# Patient Record
Sex: MALE | Race: WHITE | Employment: OTHER | URBAN - METROPOLITAN AREA
[De-identification: names, ages, dates, MRNs, and addresses within clinical notes are randomized per-mention and may not be internally consistent; named-entity substitution may affect disease eponyms.]

---

## 2017-02-16 RX ORDER — FINASTERIDE 5 MG/1
5 TABLET, FILM COATED ORAL DAILY
Qty: 90 TABLET | Refills: 3 | Status: SHIPPED | OUTPATIENT
Start: 2017-02-16 | End: 2017-03-27 | Stop reason: SDUPTHER

## 2017-02-24 RX ORDER — TAMSULOSIN HYDROCHLORIDE 0.4 MG/1
0.4 CAPSULE ORAL DAILY
Qty: 90 CAPSULE | Refills: 3 | Status: SHIPPED | OUTPATIENT
Start: 2017-02-24 | End: 2017-03-27 | Stop reason: SDUPTHER

## 2017-03-14 ENCOUNTER — HOSPITAL ENCOUNTER (OUTPATIENT)
Dept: ULTRASOUND IMAGING | Age: 82
Discharge: HOME | End: 2017-03-14
Payer: MEDICARE

## 2017-03-14 DIAGNOSIS — N28.1 ACQUIRED CYST OF KIDNEY: ICD-10-CM

## 2017-03-14 PROCEDURE — 76775 US EXAM ABDO BACK WALL LIM: CPT

## 2017-03-21 ENCOUNTER — OFFICE VISIT (OUTPATIENT)
Dept: UROLOGY | Age: 82
End: 2017-03-21

## 2017-03-21 VITALS
WEIGHT: 225 LBS | SYSTOLIC BLOOD PRESSURE: 138 MMHG | DIASTOLIC BLOOD PRESSURE: 84 MMHG | HEART RATE: 72 BPM | BODY MASS INDEX: 28.88 KG/M2 | HEIGHT: 74 IN

## 2017-03-21 DIAGNOSIS — N13.8 BPH WITH OBSTRUCTION/LOWER URINARY TRACT SYMPTOMS: Primary | ICD-10-CM

## 2017-03-21 DIAGNOSIS — N40.1 BPH WITH OBSTRUCTION/LOWER URINARY TRACT SYMPTOMS: Primary | ICD-10-CM

## 2017-03-21 LAB
ANION GAP SERPL CALCULATED.3IONS-SCNC: 13 MEQ/L (ref 7–13)
BUN BLDV-MCNC: 30 MG/DL (ref 8–23)
CALCIUM SERPL-MCNC: 9.2 MG/DL (ref 8.6–10.2)
CHLORIDE BLD-SCNC: 102 MEQ/L (ref 98–107)
CO2: 24 MEQ/L (ref 22–29)
CREAT SERPL-MCNC: 1.28 MG/DL (ref 0.7–1.2)
GFR AFRICAN AMERICAN: >60
GFR NON-AFRICAN AMERICAN: 53.3
GLUCOSE BLD-MCNC: 137 MG/DL (ref 74–109)
HBA1C MFR BLD: 7.7 % (ref 4.8–5.9)
POTASSIUM SERPL-SCNC: 5.2 MEQ/L (ref 3.5–5.1)
SODIUM BLD-SCNC: 139 MEQ/L (ref 132–144)

## 2017-03-21 PROCEDURE — 4040F PNEUMOC VAC/ADMIN/RCVD: CPT | Performed by: UROLOGY

## 2017-03-21 PROCEDURE — 99213 OFFICE O/P EST LOW 20 MIN: CPT | Performed by: UROLOGY

## 2017-03-21 PROCEDURE — G8427 DOCREV CUR MEDS BY ELIG CLIN: HCPCS | Performed by: UROLOGY

## 2017-03-21 PROCEDURE — G8420 CALC BMI NORM PARAMETERS: HCPCS | Performed by: UROLOGY

## 2017-03-21 PROCEDURE — 1036F TOBACCO NON-USER: CPT | Performed by: UROLOGY

## 2017-03-21 PROCEDURE — G8484 FLU IMMUNIZE NO ADMIN: HCPCS | Performed by: UROLOGY

## 2017-03-21 PROCEDURE — 1123F ACP DISCUSS/DSCN MKR DOCD: CPT | Performed by: UROLOGY

## 2017-03-21 RX ORDER — TAMSULOSIN HYDROCHLORIDE 0.4 MG/1
0.4 CAPSULE ORAL DAILY
Qty: 90 CAPSULE | Refills: 3 | Status: SHIPPED | OUTPATIENT
Start: 2017-03-21 | End: 2018-04-12 | Stop reason: SDUPTHER

## 2017-03-21 RX ORDER — FINASTERIDE 5 MG/1
5 TABLET, FILM COATED ORAL DAILY
Qty: 90 TABLET | Refills: 3 | Status: SHIPPED | OUTPATIENT
Start: 2017-03-21 | End: 2018-03-24 | Stop reason: SDUPTHER

## 2017-03-21 ASSESSMENT — ENCOUNTER SYMPTOMS
SHORTNESS OF BREATH: 0
ABDOMINAL PAIN: 0
ABDOMINAL DISTENTION: 0

## 2017-03-27 ENCOUNTER — OFFICE VISIT (OUTPATIENT)
Dept: SURGERY | Age: 82
End: 2017-03-27

## 2017-03-27 VITALS
WEIGHT: 230 LBS | SYSTOLIC BLOOD PRESSURE: 112 MMHG | DIASTOLIC BLOOD PRESSURE: 69 MMHG | HEIGHT: 74 IN | BODY MASS INDEX: 29.52 KG/M2 | HEART RATE: 79 BPM

## 2017-03-27 LAB — GLUCOSE BLD-MCNC: 207 MG/DL

## 2017-03-27 PROCEDURE — G8484 FLU IMMUNIZE NO ADMIN: HCPCS | Performed by: INTERNAL MEDICINE

## 2017-03-27 PROCEDURE — G8420 CALC BMI NORM PARAMETERS: HCPCS | Performed by: INTERNAL MEDICINE

## 2017-03-27 PROCEDURE — 1036F TOBACCO NON-USER: CPT | Performed by: INTERNAL MEDICINE

## 2017-03-27 PROCEDURE — 82962 GLUCOSE BLOOD TEST: CPT | Performed by: INTERNAL MEDICINE

## 2017-03-27 PROCEDURE — G8427 DOCREV CUR MEDS BY ELIG CLIN: HCPCS | Performed by: INTERNAL MEDICINE

## 2017-03-27 PROCEDURE — 99213 OFFICE O/P EST LOW 20 MIN: CPT | Performed by: INTERNAL MEDICINE

## 2017-03-27 PROCEDURE — 1123F ACP DISCUSS/DSCN MKR DOCD: CPT | Performed by: INTERNAL MEDICINE

## 2017-03-27 PROCEDURE — 4040F PNEUMOC VAC/ADMIN/RCVD: CPT | Performed by: INTERNAL MEDICINE

## 2017-03-27 ASSESSMENT — ENCOUNTER SYMPTOMS: EYES NEGATIVE: 1

## 2017-04-03 ENCOUNTER — HOSPITAL ENCOUNTER (OUTPATIENT)
Dept: CARDIOLOGY | Age: 82
Discharge: HOME | End: 2017-04-03
Payer: MEDICARE

## 2017-04-03 PROCEDURE — 93296 REM INTERROG EVL PM/IDS: CPT

## 2017-06-05 RX ORDER — RIVAROXABAN 15 MG/1
TABLET, FILM COATED ORAL
Qty: 90 TABLET | Refills: 2 | Status: SHIPPED | OUTPATIENT
Start: 2017-06-05 | End: 2018-01-22 | Stop reason: SDUPTHER

## 2017-06-06 ENCOUNTER — OFFICE VISIT (OUTPATIENT)
Dept: CARDIOLOGY | Age: 82
End: 2017-06-06

## 2017-06-06 VITALS
HEART RATE: 62 BPM | SYSTOLIC BLOOD PRESSURE: 147 MMHG | TEMPERATURE: 97.8 F | WEIGHT: 229 LBS | HEIGHT: 74 IN | RESPIRATION RATE: 18 BRPM | BODY MASS INDEX: 29.39 KG/M2 | OXYGEN SATURATION: 98 % | DIASTOLIC BLOOD PRESSURE: 79 MMHG

## 2017-06-06 DIAGNOSIS — I42.0 DILATED CARDIOMYOPATHY (HCC): Primary | ICD-10-CM

## 2017-06-06 DIAGNOSIS — I10 ESSENTIAL HYPERTENSION: Chronic | ICD-10-CM

## 2017-06-06 DIAGNOSIS — Z51.81 ANTICOAGULATION MANAGEMENT ENCOUNTER: ICD-10-CM

## 2017-06-06 DIAGNOSIS — I48.20 CHRONIC ATRIAL FIBRILLATION (HCC): Chronic | ICD-10-CM

## 2017-06-06 DIAGNOSIS — Z79.01 ANTICOAGULATION MANAGEMENT ENCOUNTER: ICD-10-CM

## 2017-06-06 DIAGNOSIS — E78.00 HYPERCHOLESTEREMIA: Chronic | ICD-10-CM

## 2017-06-06 PROCEDURE — G8427 DOCREV CUR MEDS BY ELIG CLIN: HCPCS | Performed by: INTERNAL MEDICINE

## 2017-06-06 PROCEDURE — 93000 ELECTROCARDIOGRAM COMPLETE: CPT | Performed by: INTERNAL MEDICINE

## 2017-06-06 PROCEDURE — 4040F PNEUMOC VAC/ADMIN/RCVD: CPT | Performed by: INTERNAL MEDICINE

## 2017-06-06 PROCEDURE — 1036F TOBACCO NON-USER: CPT | Performed by: INTERNAL MEDICINE

## 2017-06-06 PROCEDURE — G8419 CALC BMI OUT NRM PARAM NOF/U: HCPCS | Performed by: INTERNAL MEDICINE

## 2017-06-06 PROCEDURE — 1123F ACP DISCUSS/DSCN MKR DOCD: CPT | Performed by: INTERNAL MEDICINE

## 2017-06-06 PROCEDURE — 99214 OFFICE O/P EST MOD 30 MIN: CPT | Performed by: INTERNAL MEDICINE

## 2017-06-08 LAB
ALBUMIN SERPL-MCNC: 4 G/DL (ref 3.9–4.9)
ALP BLD-CCNC: 61 U/L (ref 35–104)
ALT SERPL-CCNC: 22 U/L (ref 0–41)
ANION GAP SERPL CALCULATED.3IONS-SCNC: 14 MEQ/L (ref 7–13)
AST SERPL-CCNC: 22 U/L (ref 0–40)
BILIRUB SERPL-MCNC: 0.5 MG/DL (ref 0–1.2)
BILIRUBIN URINE: NEGATIVE
BLOOD, URINE: ABNORMAL
BUN BLDV-MCNC: 32 MG/DL (ref 8–23)
CALCIUM SERPL-MCNC: 8.9 MG/DL (ref 8.6–10.2)
CHLORIDE BLD-SCNC: 104 MEQ/L (ref 98–107)
CLARITY: CLEAR
CO2: 22 MEQ/L (ref 22–29)
COLOR: YELLOW
CREAT SERPL-MCNC: 1.83 MG/DL (ref 0.7–1.2)
EPITHELIAL CELLS, UA: NORMAL /HPF
GFR AFRICAN AMERICAN: 42.7
GFR NON-AFRICAN AMERICAN: 35.3
GLOBULIN: 2.8 G/DL (ref 2.3–3.5)
GLUCOSE BLD-MCNC: 124 MG/DL (ref 74–109)
GLUCOSE URINE: NEGATIVE MG/DL
HCT VFR BLD CALC: 41.5 % (ref 42–52)
HEMOGLOBIN: 13.8 G/DL (ref 14–18)
KETONES, URINE: NEGATIVE MG/DL
LEUKOCYTE ESTERASE, URINE: NEGATIVE
MCH RBC QN AUTO: 31.4 PG (ref 27–31.3)
MCHC RBC AUTO-ENTMCNC: 33.2 % (ref 33–37)
MCV RBC AUTO: 94.6 FL (ref 80–100)
NITRITE, URINE: NEGATIVE
PARATHYROID HORMONE INTACT: 33.3 PG/ML (ref 15–65)
PDW BLD-RTO: 14.3 % (ref 11.5–14.5)
PH UA: 5 (ref 5–9)
PHOSPHORUS: 4.3 MG/DL (ref 2.5–4.5)
PLATELET # BLD: 129 K/UL (ref 130–400)
POTASSIUM SERPL-SCNC: 5.3 MEQ/L (ref 3.5–5.1)
PRO-BNP: 585 PG/ML
PROTEIN UA: NEGATIVE MG/DL
RBC # BLD: 4.39 M/UL (ref 4.7–6.1)
RBC UA: NORMAL /HPF (ref 0–2)
SODIUM BLD-SCNC: 140 MEQ/L (ref 132–144)
SPECIFIC GRAVITY UA: 1.02 (ref 1–1.03)
TOTAL PROTEIN: 6.8 G/DL (ref 6.4–8.1)
UROBILINOGEN, URINE: 0.2 E.U./DL
WBC # BLD: 5.4 K/UL (ref 4.8–10.8)
WBC UA: NORMAL /HPF (ref 0–5)

## 2017-06-14 ASSESSMENT — ENCOUNTER SYMPTOMS
GASTROINTESTINAL NEGATIVE: 1
ALLERGIC/IMMUNOLOGIC NEGATIVE: 1
EYES NEGATIVE: 1
CHEST TIGHTNESS: 0
SHORTNESS OF BREATH: 0

## 2017-07-10 DIAGNOSIS — I10 HTN (HYPERTENSION): ICD-10-CM

## 2017-07-10 DIAGNOSIS — I50.9 CHF (CONGESTIVE HEART FAILURE) (HCC): ICD-10-CM

## 2017-07-10 RX ORDER — SPIRONOLACTONE 25 MG/1
TABLET ORAL
Qty: 90 TABLET | Refills: 3 | Status: SHIPPED | OUTPATIENT
Start: 2017-07-10 | End: 2018-09-13 | Stop reason: SDUPTHER

## 2017-07-10 RX ORDER — CARVEDILOL 25 MG/1
TABLET ORAL
Qty: 180 TABLET | Refills: 3 | Status: SHIPPED | OUTPATIENT
Start: 2017-07-10 | End: 2018-08-15 | Stop reason: SDUPTHER

## 2017-08-09 LAB
ANION GAP SERPL CALCULATED.3IONS-SCNC: 14 MEQ/L (ref 7–13)
BUN BLDV-MCNC: 35 MG/DL (ref 8–23)
CALCIUM SERPL-MCNC: 8.7 MG/DL (ref 8.6–10.2)
CHLORIDE BLD-SCNC: 103 MEQ/L (ref 98–107)
CO2: 24 MEQ/L (ref 22–29)
CREAT SERPL-MCNC: 1.62 MG/DL (ref 0.7–1.2)
GFR AFRICAN AMERICAN: 49.1
GFR NON-AFRICAN AMERICAN: 40.6
GLUCOSE BLD-MCNC: 127 MG/DL (ref 74–109)
HBA1C MFR BLD: 7.6 % (ref 4.8–5.9)
POTASSIUM SERPL-SCNC: 4.8 MEQ/L (ref 3.5–5.1)
SODIUM BLD-SCNC: 141 MEQ/L (ref 132–144)

## 2017-08-15 ENCOUNTER — OFFICE VISIT (OUTPATIENT)
Dept: SURGERY | Age: 82
End: 2017-08-15

## 2017-08-15 VITALS
DIASTOLIC BLOOD PRESSURE: 64 MMHG | HEART RATE: 77 BPM | SYSTOLIC BLOOD PRESSURE: 99 MMHG | WEIGHT: 224 LBS | HEIGHT: 74 IN | BODY MASS INDEX: 28.75 KG/M2

## 2017-08-15 DIAGNOSIS — N18.3 CKD (CHRONIC KIDNEY DISEASE), STAGE 3 (MODERATE): ICD-10-CM

## 2017-08-15 LAB — GLUCOSE BLD-MCNC: 144 MG/DL

## 2017-08-15 PROCEDURE — G8419 CALC BMI OUT NRM PARAM NOF/U: HCPCS | Performed by: INTERNAL MEDICINE

## 2017-08-15 PROCEDURE — 99213 OFFICE O/P EST LOW 20 MIN: CPT | Performed by: INTERNAL MEDICINE

## 2017-08-15 PROCEDURE — 82962 GLUCOSE BLOOD TEST: CPT | Performed by: INTERNAL MEDICINE

## 2017-08-15 PROCEDURE — 1036F TOBACCO NON-USER: CPT | Performed by: INTERNAL MEDICINE

## 2017-08-15 PROCEDURE — 1123F ACP DISCUSS/DSCN MKR DOCD: CPT | Performed by: INTERNAL MEDICINE

## 2017-08-15 PROCEDURE — 4040F PNEUMOC VAC/ADMIN/RCVD: CPT | Performed by: INTERNAL MEDICINE

## 2017-08-15 PROCEDURE — G8427 DOCREV CUR MEDS BY ELIG CLIN: HCPCS | Performed by: INTERNAL MEDICINE

## 2017-09-14 RX ORDER — SAXAGLIPTIN 2.5 MG/1
TABLET, FILM COATED ORAL
Qty: 90 TABLET | Refills: 1 | Status: SHIPPED | OUTPATIENT
Start: 2017-09-14 | End: 2018-05-12 | Stop reason: SDUPTHER

## 2017-09-22 RX ORDER — BENAZEPRIL HYDROCHLORIDE 20 MG/1
TABLET ORAL
Qty: 180 TABLET | Refills: 3 | Status: SHIPPED | OUTPATIENT
Start: 2017-09-22 | End: 2018-01-01 | Stop reason: SDUPTHER

## 2017-09-26 ENCOUNTER — NURSE ONLY (OUTPATIENT)
Dept: UROLOGY | Age: 82
End: 2017-09-26

## 2017-09-26 ENCOUNTER — TELEPHONE (OUTPATIENT)
Dept: UROLOGY | Age: 82
End: 2017-09-26

## 2017-09-26 DIAGNOSIS — R35.0 FREQUENCY OF URINATION: Primary | ICD-10-CM

## 2017-09-26 LAB
BILIRUBIN, POC: ABNORMAL
BLOOD URINE, POC: ABNORMAL
CLARITY, POC: ABNORMAL
COLOR, POC: YELLOW
GLUCOSE URINE, POC: ABNORMAL
KETONES, POC: ABNORMAL
LEUKOCYTE EST, POC: ABNORMAL
NITRITE, POC: POSITIVE
PH, POC: 5
PROTEIN, POC: >300
SPECIFIC GRAVITY, POC: 1.02
UROBILINOGEN, POC: 0.2

## 2017-09-26 PROCEDURE — 81003 URINALYSIS AUTO W/O SCOPE: CPT | Performed by: UROLOGY

## 2017-09-26 RX ORDER — CIPROFLOXACIN 250 MG/1
250 TABLET, FILM COATED ORAL 2 TIMES DAILY
Qty: 14 TABLET | Refills: 0 | Status: SHIPPED | OUTPATIENT
Start: 2017-09-26 | End: 2017-10-05 | Stop reason: CLARIF

## 2017-09-29 LAB
ORGANISM: ABNORMAL
URINE CULTURE, ROUTINE: ABNORMAL
URINE CULTURE, ROUTINE: ABNORMAL

## 2017-10-02 ENCOUNTER — HOSPITAL ENCOUNTER (OUTPATIENT)
Dept: CARDIOLOGY | Age: 82
Discharge: HOME | End: 2017-10-02
Payer: MEDICARE

## 2017-10-02 PROCEDURE — 93290 INTERROG DEV EVAL ICPMS IP: CPT

## 2017-10-02 PROCEDURE — 93284 PRGRMG EVAL IMPLANTABLE DFB: CPT

## 2017-10-05 ENCOUNTER — OFFICE VISIT (OUTPATIENT)
Dept: UROLOGY | Age: 82
End: 2017-10-05

## 2017-10-05 VITALS
HEIGHT: 74 IN | WEIGHT: 225 LBS | DIASTOLIC BLOOD PRESSURE: 72 MMHG | BODY MASS INDEX: 28.88 KG/M2 | HEART RATE: 64 BPM | SYSTOLIC BLOOD PRESSURE: 122 MMHG

## 2017-10-05 DIAGNOSIS — R31.9 HEMATURIA: Primary | ICD-10-CM

## 2017-10-05 DIAGNOSIS — N39.0 URINARY TRACT INFECTION WITHOUT HEMATURIA, SITE UNSPECIFIED: ICD-10-CM

## 2017-10-05 DIAGNOSIS — N40.1 BPH WITH OBSTRUCTION/LOWER URINARY TRACT SYMPTOMS: ICD-10-CM

## 2017-10-05 DIAGNOSIS — N13.8 BPH WITH OBSTRUCTION/LOWER URINARY TRACT SYMPTOMS: ICD-10-CM

## 2017-10-05 LAB
BILIRUBIN, POC: ABNORMAL
BLOOD URINE, POC: ABNORMAL
CLARITY, POC: CLEAR
COLOR, POC: YELLOW
GLUCOSE URINE, POC: ABNORMAL
KETONES, POC: ABNORMAL
LEUKOCYTE EST, POC: ABNORMAL
NITRITE, POC: ABNORMAL
PH, POC: 5
PROTEIN, POC: ABNORMAL
SPECIFIC GRAVITY, POC: 1.01
UROBILINOGEN, POC: 0.2

## 2017-10-05 PROCEDURE — 99213 OFFICE O/P EST LOW 20 MIN: CPT | Performed by: UROLOGY

## 2017-10-05 PROCEDURE — 81003 URINALYSIS AUTO W/O SCOPE: CPT | Performed by: UROLOGY

## 2017-10-05 PROCEDURE — 51798 US URINE CAPACITY MEASURE: CPT | Performed by: UROLOGY

## 2017-10-05 PROCEDURE — 51741 ELECTRO-UROFLOWMETRY FIRST: CPT | Performed by: UROLOGY

## 2017-10-05 PROCEDURE — 4040F PNEUMOC VAC/ADMIN/RCVD: CPT | Performed by: UROLOGY

## 2017-10-05 PROCEDURE — G8427 DOCREV CUR MEDS BY ELIG CLIN: HCPCS | Performed by: UROLOGY

## 2017-10-05 PROCEDURE — G8419 CALC BMI OUT NRM PARAM NOF/U: HCPCS | Performed by: UROLOGY

## 2017-10-05 PROCEDURE — G8484 FLU IMMUNIZE NO ADMIN: HCPCS | Performed by: UROLOGY

## 2017-10-05 PROCEDURE — 1036F TOBACCO NON-USER: CPT | Performed by: UROLOGY

## 2017-10-05 PROCEDURE — 1123F ACP DISCUSS/DSCN MKR DOCD: CPT | Performed by: UROLOGY

## 2017-10-05 ASSESSMENT — ENCOUNTER SYMPTOMS
ABDOMINAL PAIN: 0
ABDOMINAL DISTENTION: 0

## 2017-10-05 NOTE — PROGRESS NOTES
Social History Narrative     History reviewed. No pertinent family history. Current Outpatient Prescriptions   Medication Sig Dispense Refill    glucose blood VI test strips (EXACTECH TEST) strip 1 each by In Vitro route 4 times daily As needed. 100 each 11    benazepril (LOTENSIN) 20 MG tablet Take 2 tablets by mouth  daily 180 tablet 3    ONGLYZA 2.5 MG TABS tablet Take 1 tablet by mouth  daily 90 tablet 1    spironolactone (ALDACTONE) 25 MG tablet Take 1 tablet by mouth  every day 90 tablet 3    carvedilol (COREG) 25 MG tablet Take 1 tablet by mouth  twice a day 180 tablet 3    XARELTO 15 MG TABS tablet Take 1 tablet by mouth  every 24 hours 90 tablet 2    tamsulosin (FLOMAX) 0.4 MG capsule Take 1 capsule by mouth daily 90 capsule 3    finasteride (PROSCAR) 5 MG tablet Take 1 tablet by mouth daily 90 tablet 3    chlorothiazide (DIURIL) 250 MG tablet Take 1 tablet by mouth  every day 90 tablet 2    Glucose Blood (BLOOD GLUCOSE TEST STRIPS) STRP 1 each by In Vitro route 4 times daily 400 strip 1    B Complex Vitamins (VITAMIN B COMPLEX PO) Take 1 tablet by mouth daily      furosemide (LASIX) 40 MG tablet Take 1 tablet by mouth  weekly and as needed 15 tablet 3    aspirin 81 MG chewable tablet Take by mouth      Multiple Vitamin TABS Take 1 tablet by mouth daily       pantoprazole (PROTONIX) 40 MG tablet Take 40 mg by mouth daily       Selenium 100 MCG TABS Take 100 mcg by mouth daily       Insulin Pen Needle (NOVOFINE) 32G X 6 MM MISC Use tid 100 each 11    pravastatin (PRAVACHOL) 80 MG tablet Take 80 mg by mouth daily.  Coenzyme Q10 (COQ10) 100 MG CAPS Take 100 mg by mouth 2 times daily        No current facility-administered medications for this visit. Review of patient's allergies indicates no known allergies. reviewed      Review of Systems   Gastrointestinal: Negative for abdominal distention and abdominal pain.    Genitourinary: Negative for decreased urine volume, dysuria, 5.0   Protein, UA POC 10/05/2017  2:27 PM Unknown   neg   Urobilinogen, UA 10/05/2017  2:27 PM Unknown   0.2   Leukocytes, UA 10/05/2017  2:27 PM Unknown   neg   Nitrite, UA 10/05/2017  2:27 PM Unknown   neg       Uroflow: 5 ml/sec, vol 116cc  post void residual by U/S: 19cc    Assessment: This is an 79 yo white male with h/o HTN, CKD, Stones, DM, BPH w/LUTs on Flomax and Proscar (stable), prior negative microhematuria evaluation in 2009 and stable renal cysts by prior US and now back after treatment of a UTI with Cipro and the symptoms have resolved. His diarrhea has also resolved. He is emptying the bladder well by PVR today. Will continue with present management. Plan:      1.  F/U a scheduled

## 2017-11-08 LAB
ANION GAP SERPL CALCULATED.3IONS-SCNC: 15 MEQ/L (ref 7–13)
BUN BLDV-MCNC: 32 MG/DL (ref 8–23)
CALCIUM SERPL-MCNC: 9.2 MG/DL (ref 8.6–10.2)
CHLORIDE BLD-SCNC: 99 MEQ/L (ref 98–107)
CO2: 25 MEQ/L (ref 22–29)
CREAT SERPL-MCNC: 1.37 MG/DL (ref 0.7–1.2)
GFR AFRICAN AMERICAN: 59.6
GFR NON-AFRICAN AMERICAN: 49.2
GLUCOSE BLD-MCNC: 129 MG/DL (ref 74–109)
HBA1C MFR BLD: 7.5 % (ref 4.8–5.9)
POTASSIUM SERPL-SCNC: 4.7 MEQ/L (ref 3.5–5.1)
SODIUM BLD-SCNC: 139 MEQ/L (ref 132–144)

## 2017-11-13 ENCOUNTER — OFFICE VISIT (OUTPATIENT)
Dept: SURGERY | Age: 82
End: 2017-11-13

## 2017-11-13 VITALS
WEIGHT: 231.4 LBS | SYSTOLIC BLOOD PRESSURE: 109 MMHG | OXYGEN SATURATION: 97 % | BODY MASS INDEX: 29.7 KG/M2 | HEART RATE: 73 BPM | HEIGHT: 74 IN | DIASTOLIC BLOOD PRESSURE: 71 MMHG

## 2017-11-13 LAB — GLUCOSE BLD-MCNC: 188 MG/DL

## 2017-11-13 PROCEDURE — 4040F PNEUMOC VAC/ADMIN/RCVD: CPT | Performed by: INTERNAL MEDICINE

## 2017-11-13 PROCEDURE — 1036F TOBACCO NON-USER: CPT | Performed by: INTERNAL MEDICINE

## 2017-11-13 PROCEDURE — G8427 DOCREV CUR MEDS BY ELIG CLIN: HCPCS | Performed by: INTERNAL MEDICINE

## 2017-11-13 PROCEDURE — 1123F ACP DISCUSS/DSCN MKR DOCD: CPT | Performed by: INTERNAL MEDICINE

## 2017-11-13 PROCEDURE — 99213 OFFICE O/P EST LOW 20 MIN: CPT | Performed by: INTERNAL MEDICINE

## 2017-11-13 PROCEDURE — G8484 FLU IMMUNIZE NO ADMIN: HCPCS | Performed by: INTERNAL MEDICINE

## 2017-11-13 PROCEDURE — G8419 CALC BMI OUT NRM PARAM NOF/U: HCPCS | Performed by: INTERNAL MEDICINE

## 2017-11-13 PROCEDURE — 82962 GLUCOSE BLOOD TEST: CPT | Performed by: INTERNAL MEDICINE

## 2017-11-13 NOTE — PROGRESS NOTES
Normocephalic and atraumatic. Neck: Neck supple. Cardiovascular: Normal rate. Musculoskeletal: Normal range of motion. Neurological: He is alert. Skin: Skin is warm. Psychiatric: He has a normal mood and affect. Chemistry        Component Value Date/Time     11/08/2017 0809    K 4.7 11/08/2017 0809    CL 99 11/08/2017 0809    CO2 25 11/08/2017 0809    BUN 32 (H) 11/08/2017 0809    CREATININE 1.37 (H) 11/08/2017 0809        Component Value Date/Time    CALCIUM 9.2 11/08/2017 0809    ALKPHOS 61 06/08/2017 1057    AST 22 06/08/2017 1057    ALT 22 06/08/2017 1057    BILITOT 0.5 06/08/2017 1057          Assessment:      1. Uncontrolled type 2 diabetes mellitus with complication, without long-term current use of insulin ([facility] [address]  POCT Glucose           Plan:      Orders Placed This Encounter   Procedures    Basic Metabolic Panel     Standing Status:   Future     Standing Expiration Date:   11/13/2018    Hemoglobin A1C     Standing Status:   Future     Standing Expiration Date:   11/13/2018    POCT Glucose     The current medical regimen is effective;  continue present plan and medications.     F/u in 3 months

## 2017-12-14 ENCOUNTER — OFFICE VISIT (OUTPATIENT)
Dept: CARDIOLOGY | Age: 82
End: 2017-12-14

## 2017-12-14 VITALS
RESPIRATION RATE: 16 BRPM | TEMPERATURE: 97.9 F | DIASTOLIC BLOOD PRESSURE: 70 MMHG | OXYGEN SATURATION: 97 % | HEIGHT: 74 IN | WEIGHT: 229 LBS | BODY MASS INDEX: 29.39 KG/M2 | HEART RATE: 68 BPM | SYSTOLIC BLOOD PRESSURE: 118 MMHG

## 2017-12-14 DIAGNOSIS — Z79.01 ANTICOAGULANT LONG-TERM USE: Chronic | ICD-10-CM

## 2017-12-14 DIAGNOSIS — I10 ESSENTIAL HYPERTENSION: Chronic | ICD-10-CM

## 2017-12-14 DIAGNOSIS — E78.00 HYPERCHOLESTEREMIA: Chronic | ICD-10-CM

## 2017-12-14 DIAGNOSIS — I42.0 DILATED CARDIOMYOPATHY (HCC): Primary | ICD-10-CM

## 2017-12-14 DIAGNOSIS — I48.0 PAROXYSMAL ATRIAL FIBRILLATION (HCC): ICD-10-CM

## 2017-12-14 PROCEDURE — 99214 OFFICE O/P EST MOD 30 MIN: CPT | Performed by: INTERNAL MEDICINE

## 2017-12-14 PROCEDURE — 1036F TOBACCO NON-USER: CPT | Performed by: INTERNAL MEDICINE

## 2017-12-14 PROCEDURE — G8419 CALC BMI OUT NRM PARAM NOF/U: HCPCS | Performed by: INTERNAL MEDICINE

## 2017-12-14 PROCEDURE — 93000 ELECTROCARDIOGRAM COMPLETE: CPT | Performed by: INTERNAL MEDICINE

## 2017-12-14 PROCEDURE — 4040F PNEUMOC VAC/ADMIN/RCVD: CPT | Performed by: INTERNAL MEDICINE

## 2017-12-14 PROCEDURE — G8484 FLU IMMUNIZE NO ADMIN: HCPCS | Performed by: INTERNAL MEDICINE

## 2017-12-14 PROCEDURE — 1123F ACP DISCUSS/DSCN MKR DOCD: CPT | Performed by: INTERNAL MEDICINE

## 2017-12-14 PROCEDURE — G8427 DOCREV CUR MEDS BY ELIG CLIN: HCPCS | Performed by: INTERNAL MEDICINE

## 2017-12-14 ASSESSMENT — ENCOUNTER SYMPTOMS
EYES NEGATIVE: 1
NAUSEA: 0
COUGH: 0
SHORTNESS OF BREATH: 1
GASTROINTESTINAL NEGATIVE: 1
WHEEZING: 0
CHEST TIGHTNESS: 0
BLOOD IN STOOL: 0
STRIDOR: 0

## 2017-12-14 NOTE — PROGRESS NOTES
Subsequent Progress Note  Patient: Rachele Jones  YOB: 1931  MRN: [id number]    Chief Complaint: DCM SELBY  Chief Complaint   Patient presents with    Atrial Fibrillation     6 m f/u Former [facility] pt   2008 CRT-D    Subjective/HPI:  Known DCM EF 30-35 CRTD. No bleed falss cp but ahs selby and dizziness     EKG:SR 2008  Past Medical History:   Diagnosis Date    Anticoagulant long-term use     xarelto    Atrial fibrillation (HCC)     CAD (coronary artery disease)     Cardiomyopathy ([facility] [address]     Chronic kidney disease     Hyperlipidemia     Hypertension     Osteoarthritis     Type II or unspecified type diabetes mellitus without mention of complication, not stated as uncontrolled        Past Surgical History:   Procedure Laterality Date    CORONARY ANGIOPLASTY      DIAGNOSTIC CARDIAC CATH LAB PROCEDURE      PACEMAKER INSERTION N/A 1994    SKIN CANCER EXCISION  2016       No family history on file. Social History     Social History    Marital status:      Spouse name: N/A    Number of children: N/A    Years of education: N/A     Social History Main Topics    Smoking status: Never Smoker    Smokeless tobacco: Never Used    Alcohol use None    Drug use: Unknown    Sexual activity: Not Asked     Other Topics Concern    None     Social History Narrative    None       No Known Allergies    Current Outpatient Prescriptions   Medication Sig Dispense Refill    glucose blood VI test strips (EXACTECH TEST) strip 1 each by In Vitro route 4 times daily As needed.  100 each 11    benazepril (LOTENSIN) 20 MG tablet Take 2 tablets by mouth  daily 180 tablet 3    ONGLYZA 2.5 MG TABS tablet Take 1 tablet by mouth  daily 90 tablet 1    spironolactone (ALDACTONE) 25 MG tablet Take 1 tablet by mouth  every day 90 tablet 3    carvedilol (COREG) 25 MG tablet Take 1 tablet by mouth  twice a day (Patient taking differently: Take 1/2 tablet by mouth  twice a day) 180 tablet 3    XARELTO 15 MG TABS tablet Take 1 tablet by mouth  every 24 hours 90 tablet 2    tamsulosin (FLOMAX) 0.4 MG capsule Take 1 capsule by mouth daily 90 capsule 3    finasteride (PROSCAR) 5 MG tablet Take 1 tablet by mouth daily 90 tablet 3    chlorothiazide (DIURIL) 250 MG tablet Take 1 tablet by mouth  every day 90 tablet 2    Glucose Blood (BLOOD GLUCOSE TEST STRIPS) STRP 1 each by In Vitro route 4 times daily 400 strip 1    B Complex Vitamins (VITAMIN B COMPLEX PO) Take 1 tablet by mouth daily      furosemide (LASIX) 40 MG tablet Take 1 tablet by mouth  weekly and as needed 15 tablet 3    Multiple Vitamin TABS Take 1 tablet by mouth daily       Selenium 100 MCG TABS Take 100 mcg by mouth daily       Insulin Pen Needle (NOVOFINE) 32G X 6 MM MISC Use tid 100 each 11    pravastatin (PRAVACHOL) 80 MG tablet Take 80 mg by mouth daily.  Coenzyme Q10 (COQ10) 100 MG CAPS Take 100 mg by mouth 2 times daily        No current facility-administered medications for this visit. Review of Systems:   Review of Systems   Constitutional: Negative. Negative for diaphoresis and fatigue. HENT: Negative. Eyes: Negative. Respiratory: Positive for shortness of breath. Negative for cough, chest tightness, wheezing and stridor. Cardiovascular: Negative. Negative for chest pain, palpitations and leg swelling. Gastrointestinal: Negative. Negative for blood in stool and nausea. Genitourinary: Negative. Musculoskeletal: Negative. Skin: Negative. Neurological: Positive for dizziness and light-headedness. Negative for syncope and weakness. Hematological: Negative. Psychiatric/Behavioral: Negative. Physical Examination:    /70 (Site: Right Arm, Position: Sitting, Cuff Size: Large Adult)   Pulse 68   Temp 97.9 °F (36.6 °C) (Temporal)   Resp 16   Ht 6' 2\" (1.88 m)   Wt 229 lb (103.9 kg)   SpO2 97%   BMI 29.40 kg/m²    Physical Exam   Constitutional: He appears healthy. No distress.    HENT:

## 2018-01-01 ENCOUNTER — HOSPITAL ENCOUNTER (OUTPATIENT)
Dept: CARDIOLOGY | Age: 83
Discharge: HOME | End: 2018-12-31
Payer: MEDICARE

## 2018-01-01 ENCOUNTER — OFFICE VISIT (OUTPATIENT)
Dept: CARDIOLOGY CLINIC | Age: 83
End: 2018-01-01
Payer: MEDICARE

## 2018-01-01 ENCOUNTER — OFFICE VISIT (OUTPATIENT)
Dept: ENDOCRINOLOGY | Age: 83
End: 2018-01-01
Payer: MEDICARE

## 2018-01-01 VITALS
HEIGHT: 74 IN | WEIGHT: 239 LBS | HEART RATE: 75 BPM | OXYGEN SATURATION: 95 % | DIASTOLIC BLOOD PRESSURE: 76 MMHG | SYSTOLIC BLOOD PRESSURE: 124 MMHG | BODY MASS INDEX: 30.67 KG/M2

## 2018-01-01 VITALS
SYSTOLIC BLOOD PRESSURE: 112 MMHG | HEART RATE: 64 BPM | WEIGHT: 236.8 LBS | HEIGHT: 74 IN | BODY MASS INDEX: 30.39 KG/M2 | RESPIRATION RATE: 14 BRPM | DIASTOLIC BLOOD PRESSURE: 58 MMHG | OXYGEN SATURATION: 98 %

## 2018-01-01 DIAGNOSIS — I10 ESSENTIAL HYPERTENSION: Primary | Chronic | ICD-10-CM

## 2018-01-01 DIAGNOSIS — E11.65 UNCONTROLLED TYPE 2 DIABETES MELLITUS WITH HYPERGLYCEMIA (HCC): Primary | ICD-10-CM

## 2018-01-01 DIAGNOSIS — R60.9 EDEMA, UNSPECIFIED TYPE: ICD-10-CM

## 2018-01-01 DIAGNOSIS — I50.9 CONGESTIVE HEART FAILURE, UNSPECIFIED HF CHRONICITY, UNSPECIFIED HEART FAILURE TYPE (HCC): Chronic | ICD-10-CM

## 2018-01-01 DIAGNOSIS — B35.1 ONYCHOMYCOSIS: ICD-10-CM

## 2018-01-01 DIAGNOSIS — N18.30 STAGE 3 CHRONIC KIDNEY DISEASE (HCC): ICD-10-CM

## 2018-01-01 DIAGNOSIS — I48.20 CHRONIC ATRIAL FIBRILLATION (HCC): Chronic | ICD-10-CM

## 2018-01-01 DIAGNOSIS — I42.0 DCM (DILATED CARDIOMYOPATHY) (HCC): ICD-10-CM

## 2018-01-01 DIAGNOSIS — I25.119 CORONARY ARTERY DISEASE INVOLVING NATIVE CORONARY ARTERY OF NATIVE HEART WITH ANGINA PECTORIS (HCC): ICD-10-CM

## 2018-01-01 LAB
ANION GAP SERPL CALCULATED.3IONS-SCNC: 12 MEQ/L (ref 7–13)
BUN BLDV-MCNC: 36 MG/DL (ref 8–23)
CALCIUM SERPL-MCNC: 10.1 MG/DL (ref 8.6–10.2)
CHLORIDE BLD-SCNC: 104 MEQ/L (ref 98–107)
CO2: 24 MEQ/L (ref 22–29)
CREAT SERPL-MCNC: 1.78 MG/DL (ref 0.7–1.2)
GFR AFRICAN AMERICAN: 43.9
GFR NON-AFRICAN AMERICAN: 36.3
GLUCOSE BLD-MCNC: 194 MG/DL
GLUCOSE BLD-MCNC: 84 MG/DL (ref 74–109)
HBA1C MFR BLD: 7 % (ref 4.8–5.9)
POTASSIUM SERPL-SCNC: 5.5 MEQ/L (ref 3.5–5.1)
SODIUM BLD-SCNC: 140 MEQ/L (ref 132–144)

## 2018-01-01 PROCEDURE — 4040F PNEUMOC VAC/ADMIN/RCVD: CPT | Performed by: INTERNAL MEDICINE

## 2018-01-01 PROCEDURE — G8427 DOCREV CUR MEDS BY ELIG CLIN: HCPCS | Performed by: INTERNAL MEDICINE

## 2018-01-01 PROCEDURE — 1036F TOBACCO NON-USER: CPT | Performed by: INTERNAL MEDICINE

## 2018-01-01 PROCEDURE — 1123F ACP DISCUSS/DSCN MKR DOCD: CPT | Performed by: INTERNAL MEDICINE

## 2018-01-01 PROCEDURE — 82962 GLUCOSE BLOOD TEST: CPT | Performed by: INTERNAL MEDICINE

## 2018-01-01 PROCEDURE — G8598 ASA/ANTIPLAT THER USED: HCPCS | Performed by: INTERNAL MEDICINE

## 2018-01-01 PROCEDURE — 99213 OFFICE O/P EST LOW 20 MIN: CPT | Performed by: INTERNAL MEDICINE

## 2018-01-01 PROCEDURE — 1101F PT FALLS ASSESS-DOCD LE1/YR: CPT | Performed by: INTERNAL MEDICINE

## 2018-01-01 PROCEDURE — G8484 FLU IMMUNIZE NO ADMIN: HCPCS | Performed by: INTERNAL MEDICINE

## 2018-01-01 PROCEDURE — G8417 CALC BMI ABV UP PARAM F/U: HCPCS | Performed by: INTERNAL MEDICINE

## 2018-01-01 PROCEDURE — 99215 OFFICE O/P EST HI 40 MIN: CPT | Performed by: INTERNAL MEDICINE

## 2018-01-01 PROCEDURE — 93290 INTERROG DEV EVAL ICPMS IP: CPT

## 2018-01-01 PROCEDURE — 93000 ELECTROCARDIOGRAM COMPLETE: CPT | Performed by: INTERNAL MEDICINE

## 2018-01-01 PROCEDURE — 93284 PRGRMG EVAL IMPLANTABLE DFB: CPT

## 2018-01-01 RX ORDER — FUROSEMIDE 40 MG/1
40 TABLET ORAL DAILY
Qty: 30 TABLET | Refills: 3 | Status: SHIPPED | OUTPATIENT
Start: 2018-01-01 | End: 2019-01-01

## 2018-01-01 RX ORDER — FUROSEMIDE 40 MG/1
TABLET ORAL
Qty: 15 TABLET | Refills: 3 | Status: SHIPPED | OUTPATIENT
Start: 2018-01-01 | End: 2018-01-01 | Stop reason: SDUPTHER

## 2018-01-01 RX ORDER — BENAZEPRIL HYDROCHLORIDE 20 MG/1
TABLET ORAL
Qty: 180 TABLET | Refills: 3 | Status: SHIPPED | OUTPATIENT
Start: 2018-01-01 | End: 2019-01-01

## 2018-01-01 RX ORDER — GLIMEPIRIDE 1 MG/1
TABLET ORAL
Qty: 30 TABLET | Refills: 3 | Status: SHIPPED | OUTPATIENT
Start: 2018-01-01 | End: 2019-01-01

## 2018-01-01 RX ORDER — GLIMEPIRIDE 1 MG/1
TABLET ORAL
Qty: 30 TABLET | Refills: 3
Start: 2018-01-01 | End: 2018-01-01 | Stop reason: SDUPTHER

## 2018-01-01 RX ORDER — GLIMEPIRIDE 1 MG/1
TABLET ORAL
Qty: 30 TABLET | Refills: 3 | Status: SHIPPED | OUTPATIENT
Start: 2018-01-01 | End: 2018-01-01 | Stop reason: SDUPTHER

## 2018-01-01 ASSESSMENT — ENCOUNTER SYMPTOMS
GASTROINTESTINAL NEGATIVE: 1
NAUSEA: 0
BLOOD IN STOOL: 0
SHORTNESS OF BREATH: 1
EYES NEGATIVE: 1
WHEEZING: 0
COUGH: 0
STRIDOR: 0
CHEST TIGHTNESS: 0

## 2018-01-04 DIAGNOSIS — R06.09 DOE (DYSPNEA ON EXERTION): Primary | ICD-10-CM

## 2018-01-04 DIAGNOSIS — R42 DIZZINESS: ICD-10-CM

## 2018-01-04 DIAGNOSIS — I25.119 CORONARY ARTERY DISEASE INVOLVING NATIVE CORONARY ARTERY OF NATIVE HEART WITH ANGINA PECTORIS (HCC): ICD-10-CM

## 2018-01-04 DIAGNOSIS — I42.0 DCM (DILATED CARDIOMYOPATHY) (HCC): ICD-10-CM

## 2018-01-15 ENCOUNTER — HOSPITAL ENCOUNTER (OUTPATIENT)
Dept: NUCLEAR MEDICINE | Age: 83
Discharge: HOME | End: 2018-01-15
Payer: MEDICARE

## 2018-01-15 ENCOUNTER — HOSPITAL ENCOUNTER (OUTPATIENT)
Dept: NON INVASIVE DIAGNOSTICS | Age: 83
Discharge: HOME | End: 2018-01-15
Payer: MEDICARE

## 2018-01-15 DIAGNOSIS — R42 DIZZINESS: ICD-10-CM

## 2018-01-15 DIAGNOSIS — R06.09 DOE (DYSPNEA ON EXERTION): ICD-10-CM

## 2018-01-15 DIAGNOSIS — I42.0 DILATED CARDIOMYOPATHY (HCC): ICD-10-CM

## 2018-01-15 DIAGNOSIS — I42.0 DCM (DILATED CARDIOMYOPATHY) (HCC): ICD-10-CM

## 2018-01-15 DIAGNOSIS — I25.119 CORONARY ARTERY DISEASE INVOLVING NATIVE CORONARY ARTERY OF NATIVE HEART WITH ANGINA PECTORIS (HCC): ICD-10-CM

## 2018-01-15 LAB
LV EF: 40 %
LVEF MODALITY: NORMAL

## 2018-01-15 PROCEDURE — 93017 CV STRESS TEST TRACING ONLY: CPT

## 2018-01-15 PROCEDURE — 6360000002 HC RX W HCPCS: Performed by: INTERNAL MEDICINE

## 2018-01-15 PROCEDURE — 93306 TTE W/DOPPLER COMPLETE: CPT

## 2018-01-15 PROCEDURE — A9502 TC99M TETROFOSMIN: HCPCS | Performed by: INTERNAL MEDICINE

## 2018-01-15 PROCEDURE — 2580000003 HC RX 258: Performed by: INTERNAL MEDICINE

## 2018-01-15 PROCEDURE — 78452 HT MUSCLE IMAGE SPECT MULT: CPT

## 2018-01-15 PROCEDURE — 3430000000 HC RX DIAGNOSTIC RADIOPHARMACEUTICAL: Performed by: INTERNAL MEDICINE

## 2018-01-15 RX ORDER — SODIUM CHLORIDE 0.9 % (FLUSH) 0.9 %
10 SYRINGE (ML) INJECTION PRN
Status: DISCONTINUED | OUTPATIENT
Start: 2018-01-15 | End: 2018-01-18 | Stop reason: HOSPADM

## 2018-01-15 RX ADMIN — REGADENOSON 0.4 MG: 0.08 INJECTION, SOLUTION INTRAVENOUS at 12:22

## 2018-01-15 RX ADMIN — TETROFOSMIN 8.9 MILLICURIE: 0.23 INJECTION, POWDER, LYOPHILIZED, FOR SOLUTION INTRAVENOUS at 10:30

## 2018-01-15 RX ADMIN — TETROFOSMIN 32.3 MILLICURIE: 0.23 INJECTION, POWDER, LYOPHILIZED, FOR SOLUTION INTRAVENOUS at 12:22

## 2018-01-15 RX ADMIN — Medication 10 ML: at 10:37

## 2018-01-15 RX ADMIN — Medication 20 ML: at 12:28

## 2018-01-24 ENCOUNTER — TELEPHONE (OUTPATIENT)
Dept: CARDIOLOGY | Age: 83
End: 2018-01-24

## 2018-02-08 LAB
ANION GAP SERPL CALCULATED.3IONS-SCNC: 13 MEQ/L (ref 7–13)
BUN BLDV-MCNC: 34 MG/DL (ref 8–23)
CALCIUM SERPL-MCNC: 9 MG/DL (ref 8.6–10.2)
CHLORIDE BLD-SCNC: 100 MEQ/L (ref 98–107)
CO2: 26 MEQ/L (ref 22–29)
CREAT SERPL-MCNC: 1.31 MG/DL (ref 0.7–1.2)
GFR AFRICAN AMERICAN: >60
GFR NON-AFRICAN AMERICAN: 51.8
GLUCOSE BLD-MCNC: 158 MG/DL (ref 74–109)
HBA1C MFR BLD: 7.8 % (ref 4.8–5.9)
POTASSIUM SERPL-SCNC: 5.1 MEQ/L (ref 3.5–5.1)
SODIUM BLD-SCNC: 139 MEQ/L (ref 132–144)

## 2018-02-15 RX ORDER — CHLOROTHIAZIDE 250 MG/1
TABLET ORAL
Qty: 90 TABLET | Refills: 3 | Status: SHIPPED | OUTPATIENT
Start: 2018-02-15 | End: 2019-01-01 | Stop reason: SDUPTHER

## 2018-02-26 ENCOUNTER — OFFICE VISIT (OUTPATIENT)
Dept: ENDOCRINOLOGY | Age: 83
End: 2018-02-26
Payer: MEDICARE

## 2018-02-26 VITALS
WEIGHT: 229 LBS | BODY MASS INDEX: 29.39 KG/M2 | OXYGEN SATURATION: 99 % | HEART RATE: 79 BPM | DIASTOLIC BLOOD PRESSURE: 80 MMHG | HEIGHT: 74 IN | SYSTOLIC BLOOD PRESSURE: 130 MMHG

## 2018-02-26 LAB — GLUCOSE BLD-MCNC: 126 MG/DL

## 2018-02-26 PROCEDURE — 1036F TOBACCO NON-USER: CPT | Performed by: INTERNAL MEDICINE

## 2018-02-26 PROCEDURE — 99213 OFFICE O/P EST LOW 20 MIN: CPT | Performed by: INTERNAL MEDICINE

## 2018-02-26 PROCEDURE — G8419 CALC BMI OUT NRM PARAM NOF/U: HCPCS | Performed by: INTERNAL MEDICINE

## 2018-02-26 PROCEDURE — G8484 FLU IMMUNIZE NO ADMIN: HCPCS | Performed by: INTERNAL MEDICINE

## 2018-02-26 PROCEDURE — 82962 GLUCOSE BLOOD TEST: CPT | Performed by: INTERNAL MEDICINE

## 2018-02-26 PROCEDURE — G8427 DOCREV CUR MEDS BY ELIG CLIN: HCPCS | Performed by: INTERNAL MEDICINE

## 2018-02-26 PROCEDURE — 1123F ACP DISCUSS/DSCN MKR DOCD: CPT | Performed by: INTERNAL MEDICINE

## 2018-02-26 PROCEDURE — 4040F PNEUMOC VAC/ADMIN/RCVD: CPT | Performed by: INTERNAL MEDICINE

## 2018-02-26 PROCEDURE — G8598 ASA/ANTIPLAT THER USED: HCPCS | Performed by: INTERNAL MEDICINE

## 2018-02-26 NOTE — PROGRESS NOTES
Subjective:      Patient ID: Mich Roberst is a 80 y.o. male. Diabetes   He presents for his follow-up diabetic visit. He has type 2 diabetes mellitus. His disease course has been worsening. Hypoglycemia symptoms include dizziness and tremors. Associated symptoms include fatigue. Hypoglycemia complications include required assistance. Diabetic complications include autonomic neuropathy, heart disease and nephropathy. Risk factors for coronary artery disease include diabetes mellitus, dyslipidemia, male sex and obesity. Current diabetic treatment includes oral agent (monotherapy) (ONGLYZA 2.5 MG DAILY ). He is compliant with treatment most of the time. His weight is fluctuating minimally. He is following a generally healthy diet. His overall blood glucose range is 180-200 mg/dl. (Lab Results       Component                Value               Date                       LABA1C                   7.8 (H)             02/08/2018            ) An ACE inhibitor/angiotensin II receptor blocker is being taken. Eye exam is current (6/08/17 ). Patient Active Problem List   Diagnosis    Type II diabetes mellitus, uncontrolled (HCC)    CHF (congestive heart failure)    BPH (benign prostatic hyperplasia)    Hypercholesteremia    CKD (chronic kidney disease) stage 3, GFR 30-59 ml/min    Dystrophy of anterior cornea    Acute sinusitis    Basal cell carcinoma of nose    Retained foreign body of eyelid    Tear film insufficiency    Hypertension    Atrial fibrillation (HCC)    Anticoagulant long-term use    Cardiomyopathy ([facility] [address]     No Known Allergies        Current Outpatient Prescriptions:     chlorothiazide (DIURIL) 250 MG tablet, Take 1 tablet by mouth  every day, Disp: 90 tablet, Rfl: 3    rivaroxaban (XARELTO) 15 MG TABS tablet, Take 1 tablet by mouth  every 24 hours, Disp: 90 tablet, Rfl: 2    glucose blood VI test strips (EXACTECH TEST) strip, 1 each by In Vitro route 4 times daily As needed. , Disp: 100 each, Rfl: 11    benazepril (LOTENSIN) 20 MG tablet, Take 2 tablets by mouth  daily, Disp: 180 tablet, Rfl: 3    ONGLYZA 2.5 MG TABS tablet, Take 1 tablet by mouth  daily, Disp: 90 tablet, Rfl: 1    spironolactone (ALDACTONE) 25 MG tablet, Take 1 tablet by mouth  every day, Disp: 90 tablet, Rfl: 3    carvedilol (COREG) 25 MG tablet, Take 1 tablet by mouth  twice a day (Patient taking differently: Take 1/2 tablet by mouth  twice a day), Disp: 180 tablet, Rfl: 3    tamsulosin (FLOMAX) 0.4 MG capsule, Take 1 capsule by mouth daily, Disp: 90 capsule, Rfl: 3    finasteride (PROSCAR) 5 MG tablet, Take 1 tablet by mouth daily, Disp: 90 tablet, Rfl: 3    Glucose Blood (BLOOD GLUCOSE TEST STRIPS) STRP, 1 each by In Vitro route 4 times daily, Disp: 400 strip, Rfl: 1    B Complex Vitamins (VITAMIN B COMPLEX PO), Take 1 tablet by mouth daily, Disp: , Rfl:     furosemide (LASIX) 40 MG tablet, Take 1 tablet by mouth  weekly and as needed, Disp: 15 tablet, Rfl: 3    Multiple Vitamin TABS, Take 1 tablet by mouth daily , Disp: , Rfl:     Selenium 100 MCG TABS, Take 100 mcg by mouth daily , Disp: , Rfl:     Insulin Pen Needle (NOVOFINE) 32G X 6 MM MISC, Use tid, Disp: 100 each, Rfl: 11    pravastatin (PRAVACHOL) 80 MG tablet, Take 80 mg by mouth daily. , Disp: , Rfl:     Coenzyme Q10 (COQ10) 100 MG CAPS, Take 100 mg by mouth 2 times daily , Disp: , Rfl:       Review of Systems   Constitutional: Positive for fatigue. Neurological: Positive for dizziness and tremors. All other systems reviewed and are negative. Vitals:    02/26/18 1555   BP: 130/80   Pulse: 79   SpO2: 99%   Weight: 229 lb (103.9 kg)   Height: 6' 2\" (1.88 m)       Objective:   Physical Exam   Constitutional: He appears well-developed and well-nourished. HENT:   Head: Normocephalic and atraumatic. Neck: Neck supple. Cardiovascular: Normal rate and normal heart sounds.     Pulmonary/Chest: Effort normal and breath sounds normal.

## 2018-03-14 ENCOUNTER — OFFICE VISIT (OUTPATIENT)
Dept: PULMONOLOGY | Age: 83
End: 2018-03-14
Payer: MEDICARE

## 2018-03-14 VITALS
BODY MASS INDEX: 29.39 KG/M2 | HEART RATE: 65 BPM | WEIGHT: 229 LBS | TEMPERATURE: 96.7 F | DIASTOLIC BLOOD PRESSURE: 70 MMHG | SYSTOLIC BLOOD PRESSURE: 130 MMHG | HEIGHT: 74 IN | OXYGEN SATURATION: 94 %

## 2018-03-14 DIAGNOSIS — Z99.89 OSA ON CPAP: Primary | ICD-10-CM

## 2018-03-14 DIAGNOSIS — I50.9 CONGESTIVE HEART FAILURE, UNSPECIFIED CONGESTIVE HEART FAILURE CHRONICITY, UNSPECIFIED CONGESTIVE HEART FAILURE TYPE: Chronic | ICD-10-CM

## 2018-03-14 DIAGNOSIS — I48.20 CHRONIC ATRIAL FIBRILLATION (HCC): Chronic | ICD-10-CM

## 2018-03-14 DIAGNOSIS — E66.3 OVERWEIGHT (BMI 25.0-29.9): ICD-10-CM

## 2018-03-14 DIAGNOSIS — G47.33 OSA ON CPAP: Primary | ICD-10-CM

## 2018-03-14 PROCEDURE — G8427 DOCREV CUR MEDS BY ELIG CLIN: HCPCS | Performed by: INTERNAL MEDICINE

## 2018-03-14 PROCEDURE — 1123F ACP DISCUSS/DSCN MKR DOCD: CPT | Performed by: INTERNAL MEDICINE

## 2018-03-14 PROCEDURE — G8484 FLU IMMUNIZE NO ADMIN: HCPCS | Performed by: INTERNAL MEDICINE

## 2018-03-14 PROCEDURE — 4040F PNEUMOC VAC/ADMIN/RCVD: CPT | Performed by: INTERNAL MEDICINE

## 2018-03-14 PROCEDURE — G8598 ASA/ANTIPLAT THER USED: HCPCS | Performed by: INTERNAL MEDICINE

## 2018-03-14 PROCEDURE — G8419 CALC BMI OUT NRM PARAM NOF/U: HCPCS | Performed by: INTERNAL MEDICINE

## 2018-03-14 PROCEDURE — 99204 OFFICE O/P NEW MOD 45 MIN: CPT | Performed by: INTERNAL MEDICINE

## 2018-03-14 PROCEDURE — 1036F TOBACCO NON-USER: CPT | Performed by: INTERNAL MEDICINE

## 2018-03-14 ASSESSMENT — ENCOUNTER SYMPTOMS
VOMITING: 0
VOICE CHANGE: 0
CHEST TIGHTNESS: 0
EYE ITCHING: 0
ABDOMINAL PAIN: 0
DIARRHEA: 0
SORE THROAT: 0
WHEEZING: 0
COUGH: 0
NAUSEA: 0
RHINORRHEA: 0
SHORTNESS OF BREATH: 0

## 2018-03-20 ENCOUNTER — HOSPITAL ENCOUNTER (OUTPATIENT)
Dept: SLEEP CENTER | Age: 83
Discharge: HOME | End: 2018-03-22
Payer: MEDICARE

## 2018-03-20 PROCEDURE — 95811 POLYSOM 6/>YRS CPAP 4/> PARM: CPT

## 2018-03-21 DIAGNOSIS — N40.1 BPH WITH OBSTRUCTION/LOWER URINARY TRACT SYMPTOMS: ICD-10-CM

## 2018-03-21 DIAGNOSIS — N13.8 BPH WITH OBSTRUCTION/LOWER URINARY TRACT SYMPTOMS: ICD-10-CM

## 2018-03-21 LAB — PROSTATE SPECIFIC ANTIGEN: 0.17 NG/ML (ref 0–6.22)

## 2018-03-22 ENCOUNTER — OFFICE VISIT (OUTPATIENT)
Dept: UROLOGY | Age: 83
End: 2018-03-22
Payer: MEDICARE

## 2018-03-22 VITALS
DIASTOLIC BLOOD PRESSURE: 80 MMHG | BODY MASS INDEX: 29.77 KG/M2 | HEART RATE: 74 BPM | SYSTOLIC BLOOD PRESSURE: 122 MMHG | HEIGHT: 74 IN | WEIGHT: 232 LBS

## 2018-03-22 DIAGNOSIS — N40.1 BPH WITH OBSTRUCTION/LOWER URINARY TRACT SYMPTOMS: Primary | ICD-10-CM

## 2018-03-22 DIAGNOSIS — N13.8 BPH WITH OBSTRUCTION/LOWER URINARY TRACT SYMPTOMS: Primary | ICD-10-CM

## 2018-03-22 PROCEDURE — 1036F TOBACCO NON-USER: CPT | Performed by: UROLOGY

## 2018-03-22 PROCEDURE — G8598 ASA/ANTIPLAT THER USED: HCPCS | Performed by: UROLOGY

## 2018-03-22 PROCEDURE — 1123F ACP DISCUSS/DSCN MKR DOCD: CPT | Performed by: UROLOGY

## 2018-03-22 PROCEDURE — 99213 OFFICE O/P EST LOW 20 MIN: CPT | Performed by: UROLOGY

## 2018-03-22 PROCEDURE — 4040F PNEUMOC VAC/ADMIN/RCVD: CPT | Performed by: UROLOGY

## 2018-03-22 PROCEDURE — G8484 FLU IMMUNIZE NO ADMIN: HCPCS | Performed by: UROLOGY

## 2018-03-22 PROCEDURE — G8419 CALC BMI OUT NRM PARAM NOF/U: HCPCS | Performed by: UROLOGY

## 2018-03-22 PROCEDURE — G8427 DOCREV CUR MEDS BY ELIG CLIN: HCPCS | Performed by: UROLOGY

## 2018-03-22 ASSESSMENT — ENCOUNTER SYMPTOMS: SHORTNESS OF BREATH: 0

## 2018-03-24 DIAGNOSIS — N40.1 BPH WITH OBSTRUCTION/LOWER URINARY TRACT SYMPTOMS: ICD-10-CM

## 2018-03-24 DIAGNOSIS — N13.8 BPH WITH OBSTRUCTION/LOWER URINARY TRACT SYMPTOMS: ICD-10-CM

## 2018-03-26 RX ORDER — FINASTERIDE 5 MG/1
5 TABLET, FILM COATED ORAL DAILY
Qty: 90 TABLET | Refills: 3 | Status: SHIPPED | OUTPATIENT
Start: 2018-03-26 | End: 2019-01-01

## 2018-04-02 ENCOUNTER — HOSPITAL ENCOUNTER (OUTPATIENT)
Dept: CARDIOLOGY | Age: 83
Discharge: HOME | End: 2018-04-02
Payer: MEDICARE

## 2018-04-02 ENCOUNTER — OFFICE VISIT (OUTPATIENT)
Dept: PULMONOLOGY | Age: 83
End: 2018-04-02
Payer: MEDICARE

## 2018-04-02 VITALS
HEART RATE: 71 BPM | BODY MASS INDEX: 29.53 KG/M2 | OXYGEN SATURATION: 98 % | WEIGHT: 230 LBS | TEMPERATURE: 95.6 F | RESPIRATION RATE: 14 BRPM | SYSTOLIC BLOOD PRESSURE: 124 MMHG | DIASTOLIC BLOOD PRESSURE: 60 MMHG

## 2018-04-02 DIAGNOSIS — G47.33 OSA ON CPAP: Primary | ICD-10-CM

## 2018-04-02 DIAGNOSIS — Z99.89 OSA ON CPAP: Primary | ICD-10-CM

## 2018-04-02 DIAGNOSIS — E66.9 OBESITY (BMI 30-39.9): ICD-10-CM

## 2018-04-02 DIAGNOSIS — I48.20 CHRONIC ATRIAL FIBRILLATION (HCC): ICD-10-CM

## 2018-04-02 DIAGNOSIS — I42.9 CARDIOMYOPATHY, UNSPECIFIED TYPE (HCC): ICD-10-CM

## 2018-04-02 PROCEDURE — G8419 CALC BMI OUT NRM PARAM NOF/U: HCPCS | Performed by: INTERNAL MEDICINE

## 2018-04-02 PROCEDURE — 1036F TOBACCO NON-USER: CPT | Performed by: INTERNAL MEDICINE

## 2018-04-02 PROCEDURE — 4040F PNEUMOC VAC/ADMIN/RCVD: CPT | Performed by: INTERNAL MEDICINE

## 2018-04-02 PROCEDURE — G8598 ASA/ANTIPLAT THER USED: HCPCS | Performed by: INTERNAL MEDICINE

## 2018-04-02 PROCEDURE — 99214 OFFICE O/P EST MOD 30 MIN: CPT | Performed by: INTERNAL MEDICINE

## 2018-04-02 PROCEDURE — G8427 DOCREV CUR MEDS BY ELIG CLIN: HCPCS | Performed by: INTERNAL MEDICINE

## 2018-04-02 PROCEDURE — 93296 REM INTERROG EVL PM/IDS: CPT

## 2018-04-02 PROCEDURE — 1123F ACP DISCUSS/DSCN MKR DOCD: CPT | Performed by: INTERNAL MEDICINE

## 2018-04-02 ASSESSMENT — ENCOUNTER SYMPTOMS
ABDOMINAL PAIN: 0
RHINORRHEA: 0
WHEEZING: 0
DIARRHEA: 0
VOMITING: 0
COUGH: 0
CHEST TIGHTNESS: 0
EYE ITCHING: 0
NAUSEA: 0
VOICE CHANGE: 0
SHORTNESS OF BREATH: 0
SORE THROAT: 0

## 2018-04-05 ENCOUNTER — TELEPHONE (OUTPATIENT)
Dept: PULMONOLOGY | Age: 83
End: 2018-04-05

## 2018-04-12 ENCOUNTER — TELEPHONE (OUTPATIENT)
Dept: UROLOGY | Age: 83
End: 2018-04-12

## 2018-04-12 DIAGNOSIS — N40.1 BPH WITH OBSTRUCTION/LOWER URINARY TRACT SYMPTOMS: ICD-10-CM

## 2018-04-12 DIAGNOSIS — N13.8 BPH WITH OBSTRUCTION/LOWER URINARY TRACT SYMPTOMS: ICD-10-CM

## 2018-04-12 RX ORDER — TAMSULOSIN HYDROCHLORIDE 0.4 MG/1
0.4 CAPSULE ORAL DAILY
Qty: 30 CAPSULE | Refills: 0 | Status: SHIPPED | OUTPATIENT
Start: 2018-04-12 | End: 2018-05-22 | Stop reason: SDUPTHER

## 2018-04-23 RX ORDER — TAMSULOSIN HYDROCHLORIDE 0.4 MG/1
0.4 CAPSULE ORAL DAILY
Qty: 90 CAPSULE | Refills: 3 | Status: SHIPPED | OUTPATIENT
Start: 2018-04-23 | End: 2019-01-01

## 2018-05-14 RX ORDER — SAXAGLIPTIN 2.5 MG/1
TABLET, FILM COATED ORAL
Qty: 90 TABLET | Refills: 1 | Status: SHIPPED | OUTPATIENT
Start: 2018-05-14 | End: 2018-09-09 | Stop reason: SDUPTHER

## 2018-05-15 LAB
ANION GAP SERPL CALCULATED.3IONS-SCNC: 11 MEQ/L (ref 7–13)
BUN BLDV-MCNC: 33 MG/DL (ref 8–23)
CALCIUM SERPL-MCNC: 9.4 MG/DL (ref 8.6–10.2)
CHLORIDE BLD-SCNC: 102 MEQ/L (ref 98–107)
CO2: 27 MEQ/L (ref 22–29)
CREAT SERPL-MCNC: 1.79 MG/DL (ref 0.7–1.2)
GFR AFRICAN AMERICAN: 43.7
GFR NON-AFRICAN AMERICAN: 36.1
GLUCOSE BLD-MCNC: 131 MG/DL (ref 74–109)
HBA1C MFR BLD: 8.1 % (ref 4.8–5.9)
POTASSIUM SERPL-SCNC: 4.8 MEQ/L (ref 3.5–5.1)
SODIUM BLD-SCNC: 140 MEQ/L (ref 132–144)

## 2018-05-22 ENCOUNTER — OFFICE VISIT (OUTPATIENT)
Dept: ENDOCRINOLOGY | Age: 83
End: 2018-05-22
Payer: MEDICARE

## 2018-05-22 VITALS
SYSTOLIC BLOOD PRESSURE: 134 MMHG | DIASTOLIC BLOOD PRESSURE: 82 MMHG | WEIGHT: 231 LBS | BODY MASS INDEX: 29.65 KG/M2 | HEART RATE: 76 BPM | HEIGHT: 74 IN

## 2018-05-22 LAB — GLUCOSE BLD-MCNC: 202 MG/DL

## 2018-05-22 PROCEDURE — 82962 GLUCOSE BLOOD TEST: CPT | Performed by: INTERNAL MEDICINE

## 2018-05-22 PROCEDURE — 99213 OFFICE O/P EST LOW 20 MIN: CPT | Performed by: INTERNAL MEDICINE

## 2018-05-22 PROCEDURE — G8427 DOCREV CUR MEDS BY ELIG CLIN: HCPCS | Performed by: INTERNAL MEDICINE

## 2018-05-22 PROCEDURE — 1123F ACP DISCUSS/DSCN MKR DOCD: CPT | Performed by: INTERNAL MEDICINE

## 2018-05-22 PROCEDURE — G8419 CALC BMI OUT NRM PARAM NOF/U: HCPCS | Performed by: INTERNAL MEDICINE

## 2018-05-22 PROCEDURE — G8598 ASA/ANTIPLAT THER USED: HCPCS | Performed by: INTERNAL MEDICINE

## 2018-05-22 PROCEDURE — 1036F TOBACCO NON-USER: CPT | Performed by: INTERNAL MEDICINE

## 2018-05-22 PROCEDURE — 4040F PNEUMOC VAC/ADMIN/RCVD: CPT | Performed by: INTERNAL MEDICINE

## 2018-06-25 ENCOUNTER — OFFICE VISIT (OUTPATIENT)
Dept: CARDIOLOGY CLINIC | Age: 83
End: 2018-06-25
Payer: MEDICARE

## 2018-06-25 VITALS
OXYGEN SATURATION: 98 % | TEMPERATURE: 98.5 F | DIASTOLIC BLOOD PRESSURE: 80 MMHG | WEIGHT: 229 LBS | SYSTOLIC BLOOD PRESSURE: 130 MMHG | RESPIRATION RATE: 16 BRPM | HEIGHT: 74 IN | HEART RATE: 71 BPM | BODY MASS INDEX: 29.39 KG/M2

## 2018-06-25 DIAGNOSIS — I50.9 CONGESTIVE HEART FAILURE, UNSPECIFIED CONGESTIVE HEART FAILURE CHRONICITY, UNSPECIFIED CONGESTIVE HEART FAILURE TYPE: Chronic | ICD-10-CM

## 2018-06-25 DIAGNOSIS — Z95.810 AICD (AUTOMATIC CARDIOVERTER/DEFIBRILLATOR) PRESENT: ICD-10-CM

## 2018-06-25 DIAGNOSIS — I10 ESSENTIAL HYPERTENSION: Chronic | ICD-10-CM

## 2018-06-25 DIAGNOSIS — I48.20 CHRONIC ATRIAL FIBRILLATION (HCC): Primary | Chronic | ICD-10-CM

## 2018-06-25 PROCEDURE — 4040F PNEUMOC VAC/ADMIN/RCVD: CPT | Performed by: INTERNAL MEDICINE

## 2018-06-25 PROCEDURE — G8427 DOCREV CUR MEDS BY ELIG CLIN: HCPCS | Performed by: INTERNAL MEDICINE

## 2018-06-25 PROCEDURE — 1036F TOBACCO NON-USER: CPT | Performed by: INTERNAL MEDICINE

## 2018-06-25 PROCEDURE — G8598 ASA/ANTIPLAT THER USED: HCPCS | Performed by: INTERNAL MEDICINE

## 2018-06-25 PROCEDURE — 99214 OFFICE O/P EST MOD 30 MIN: CPT | Performed by: INTERNAL MEDICINE

## 2018-06-25 PROCEDURE — G8419 CALC BMI OUT NRM PARAM NOF/U: HCPCS | Performed by: INTERNAL MEDICINE

## 2018-06-25 PROCEDURE — 1123F ACP DISCUSS/DSCN MKR DOCD: CPT | Performed by: INTERNAL MEDICINE

## 2018-06-25 PROCEDURE — 93000 ELECTROCARDIOGRAM COMPLETE: CPT | Performed by: INTERNAL MEDICINE

## 2018-06-25 ASSESSMENT — ENCOUNTER SYMPTOMS
COUGH: 0
EYES NEGATIVE: 1
CHEST TIGHTNESS: 0
STRIDOR: 0
SHORTNESS OF BREATH: 1
BLOOD IN STOOL: 0
GASTROINTESTINAL NEGATIVE: 1
NAUSEA: 0
WHEEZING: 0

## 2018-07-02 ENCOUNTER — HOSPITAL ENCOUNTER (OUTPATIENT)
Dept: CARDIOLOGY | Age: 83
Discharge: HOME | End: 2018-07-02
Payer: MEDICARE

## 2018-07-02 PROCEDURE — 93284 PRGRMG EVAL IMPLANTABLE DFB: CPT

## 2018-07-02 PROCEDURE — 93290 INTERROG DEV EVAL ICPMS IP: CPT

## 2018-07-09 ENCOUNTER — OFFICE VISIT (OUTPATIENT)
Dept: PULMONOLOGY | Age: 83
End: 2018-07-09
Payer: MEDICARE

## 2018-07-09 VITALS
TEMPERATURE: 98.1 F | HEART RATE: 73 BPM | SYSTOLIC BLOOD PRESSURE: 116 MMHG | WEIGHT: 229.8 LBS | OXYGEN SATURATION: 97 % | BODY MASS INDEX: 29.49 KG/M2 | HEIGHT: 74 IN | DIASTOLIC BLOOD PRESSURE: 70 MMHG

## 2018-07-09 DIAGNOSIS — I42.9 CARDIOMYOPATHY, UNSPECIFIED TYPE (HCC): ICD-10-CM

## 2018-07-09 DIAGNOSIS — I48.20 CHRONIC ATRIAL FIBRILLATION (HCC): Chronic | ICD-10-CM

## 2018-07-09 DIAGNOSIS — Z99.89 OSA ON CPAP: Primary | ICD-10-CM

## 2018-07-09 DIAGNOSIS — G47.33 OSA ON CPAP: Primary | ICD-10-CM

## 2018-07-09 DIAGNOSIS — E66.3 OVERWEIGHT (BMI 25.0-29.9): ICD-10-CM

## 2018-07-09 PROCEDURE — 99214 OFFICE O/P EST MOD 30 MIN: CPT | Performed by: INTERNAL MEDICINE

## 2018-07-09 PROCEDURE — G8427 DOCREV CUR MEDS BY ELIG CLIN: HCPCS | Performed by: INTERNAL MEDICINE

## 2018-07-09 PROCEDURE — 4040F PNEUMOC VAC/ADMIN/RCVD: CPT | Performed by: INTERNAL MEDICINE

## 2018-07-09 PROCEDURE — 1036F TOBACCO NON-USER: CPT | Performed by: INTERNAL MEDICINE

## 2018-07-09 PROCEDURE — G8598 ASA/ANTIPLAT THER USED: HCPCS | Performed by: INTERNAL MEDICINE

## 2018-07-09 PROCEDURE — 1123F ACP DISCUSS/DSCN MKR DOCD: CPT | Performed by: INTERNAL MEDICINE

## 2018-07-09 PROCEDURE — G8419 CALC BMI OUT NRM PARAM NOF/U: HCPCS | Performed by: INTERNAL MEDICINE

## 2018-07-09 ASSESSMENT — ENCOUNTER SYMPTOMS
EYE ITCHING: 0
COUGH: 0
SORE THROAT: 0
VOMITING: 0
SHORTNESS OF BREATH: 0
RHINORRHEA: 0
CHEST TIGHTNESS: 0
ABDOMINAL PAIN: 0
VOICE CHANGE: 0
WHEEZING: 0
DIARRHEA: 0
NAUSEA: 0

## 2018-07-09 NOTE — PROGRESS NOTES
mouth daily.  Coenzyme Q10 (COQ10) 100 MG CAPS Take 100 mg by mouth 2 times daily        No current facility-administered medications for this visit. Results for orders placed during the hospital encounter of 03/26/14   XR Chest Standard TWO VW    Narrative TWO CHEST VIEWS      REASON FOR EXAM COUGH. COMPARISON November 1, 2010. FINDINGS Bipolar ICD device remains in satisfactory position. Heart  size normal. Lung fields clear of any fresh infiltrate. No overt signs  of pulmonary edema. No effusion. Bones intact. IMPRESSION STABLE CHEST. NO ACTIVE DISEASE. Cary Gallagher By- Katie Pickens M.D. Released By- Katie Pickens M.D. Released Date Time- 03/26/14 1641   This document has been electronically signed. ------------------------------------------------------------------------------       Assessment/Plan:     1. SURENDRA on CPAP  Patient is using CPAP with  8-20 centimeters of H2O with heated humidity. Patient is using CPAP for about 7-8 hours every night. Patient is using CPAP with full face  Mask. Patient said  sleep is restful with the CPAP use.he has new CPAP mask. He did not qualified for new CPAP machine. Patient said he has humidity problem, water goes away fast and and his mouth is dry. He will call DME to adjust humidity. Patient is compliant with CPAP therapy and benefiting with CPAP use. Counseling: CPAP/BiPAP uses, patient advised to use CPAP at least 5-6 hours every night. Driving: patient is advised for extreme caution when driving or operating machinery if there is a feeling of drowsiness, especially while driving it is preferable to stop driving and take a brief nap. Sleep hygiene:Avoid supine sleep, sleep on her sides. Avoid  sleep deprivation. Explained sleep hygiene. Advice to avoid Alcohol and sedative    Time spend over 25 min. Face to face. with greater than 50 % time with counseling regarding CPAP therapy. 2. Overweight (BMI 25.0-29. 9)  Patient patient is advised try to lose weight. obesity related risk explained to the patient ,  Current weight:  229 lb 12.8 oz (104.2 kg) Lbs. BMI:  Body mass index is 29.5 kg/m². 3. Chronic atrial fibrillation (HCC)  He is on Xarelto     4. Cardiomyopathy, unspecified type [facility]), s/p ICD  He is following with dr. Gloria Frank. Return in about 6 months (around 1/9/2019) for miguel.       Chen Tim MD

## 2018-08-15 RX ORDER — CARVEDILOL 25 MG/1
TABLET ORAL
Qty: 180 TABLET | Refills: 3 | Status: SHIPPED | OUTPATIENT
Start: 2018-08-15 | End: 2019-01-01

## 2018-08-16 LAB
ANION GAP SERPL CALCULATED.3IONS-SCNC: 12 MEQ/L (ref 7–13)
BUN BLDV-MCNC: 30 MG/DL (ref 8–23)
CALCIUM SERPL-MCNC: 9.4 MG/DL (ref 8.6–10.2)
CHLORIDE BLD-SCNC: 102 MEQ/L (ref 98–107)
CO2: 27 MEQ/L (ref 22–29)
CREAT SERPL-MCNC: 1.43 MG/DL (ref 0.7–1.2)
GFR AFRICAN AMERICAN: 56.6
GFR NON-AFRICAN AMERICAN: 46.8
GLUCOSE BLD-MCNC: 158 MG/DL (ref 74–109)
HBA1C MFR BLD: 8.4 % (ref 4.8–5.9)
POTASSIUM SERPL-SCNC: 5.1 MEQ/L (ref 3.5–5.1)
SODIUM BLD-SCNC: 141 MEQ/L (ref 132–144)

## 2018-08-22 ENCOUNTER — OFFICE VISIT (OUTPATIENT)
Dept: ENDOCRINOLOGY | Age: 83
End: 2018-08-22
Payer: MEDICARE

## 2018-08-22 VITALS
HEART RATE: 78 BPM | SYSTOLIC BLOOD PRESSURE: 130 MMHG | HEIGHT: 74 IN | DIASTOLIC BLOOD PRESSURE: 81 MMHG | WEIGHT: 230 LBS | BODY MASS INDEX: 29.52 KG/M2

## 2018-08-22 DIAGNOSIS — N18.30 STAGE 3 CHRONIC KIDNEY DISEASE (HCC): ICD-10-CM

## 2018-08-22 LAB — GLUCOSE BLD-MCNC: 157 MG/DL

## 2018-08-22 PROCEDURE — 82962 GLUCOSE BLOOD TEST: CPT | Performed by: INTERNAL MEDICINE

## 2018-08-22 PROCEDURE — G8419 CALC BMI OUT NRM PARAM NOF/U: HCPCS | Performed by: INTERNAL MEDICINE

## 2018-08-22 PROCEDURE — 1123F ACP DISCUSS/DSCN MKR DOCD: CPT | Performed by: INTERNAL MEDICINE

## 2018-08-22 PROCEDURE — 1036F TOBACCO NON-USER: CPT | Performed by: INTERNAL MEDICINE

## 2018-08-22 PROCEDURE — G8598 ASA/ANTIPLAT THER USED: HCPCS | Performed by: INTERNAL MEDICINE

## 2018-08-22 PROCEDURE — G8427 DOCREV CUR MEDS BY ELIG CLIN: HCPCS | Performed by: INTERNAL MEDICINE

## 2018-08-22 PROCEDURE — 1101F PT FALLS ASSESS-DOCD LE1/YR: CPT | Performed by: INTERNAL MEDICINE

## 2018-08-22 PROCEDURE — 99213 OFFICE O/P EST LOW 20 MIN: CPT | Performed by: INTERNAL MEDICINE

## 2018-08-22 PROCEDURE — 4040F PNEUMOC VAC/ADMIN/RCVD: CPT | Performed by: INTERNAL MEDICINE

## 2018-08-22 NOTE — PROGRESS NOTES
Subjective:      Patient ID: Rebeca Root is a 80 y.o. male. 3 month f/u     Diabetes   He presents for his follow-up diabetic visit. He has type 2 diabetes mellitus. His disease course has been worsening. Hypoglycemia symptoms include dizziness and tremors. Hypoglycemia complications include required assistance. Diabetic complications include autonomic neuropathy, heart disease and nephropathy. Risk factors for coronary artery disease include diabetes mellitus, dyslipidemia, male sex and obesity. Current diabetic treatment includes oral agent (monotherapy) (ONGLYZA 2.5 MG DAILY ). He is compliant with treatment most of the time. He is following a generally healthy diet. His overall blood glucose range is 180-200 mg/dl. (Lab Results       Component                Value               Date                       LABA1C                   8.4 (H)             08/16/2018            ) An ACE inhibitor/angiotensin II receptor blocker is being taken. Eye exam is current (2/2018 dr Desmond Brown ).      Patient Active Problem List   Diagnosis    Type II diabetes mellitus, uncontrolled ([address]    CHF (congestive heart failure)    BPH (benign prostatic hyperplasia)    Hypercholesteremia    CKD (chronic kidney disease) stage 3, GFR 30-59 ml/min    Dystrophy of anterior cornea    Acute sinusitis    Basal cell carcinoma of nose    Retained foreign body of eyelid    Tear film insufficiency    Hypertension    Atrial fibrillation (HCC)    Anticoagulant long-term use    Cardiomyopathy ([facility] [address]    AICD (automatic cardioverter/defibrillator) present     No Known Allergies        Current Outpatient Prescriptions:     Bromocriptine Mesylate (CYCLOSET) 0.8 MG TABS, 1-4 pills  po in am, Disp: 360 tablet, Rfl: 02    carvedilol (COREG) 25 MG tablet, Take 1 tablet by mouth  twice a day, Disp: 180 tablet, Rfl: 3    ONGLYZA 2.5 MG TABS tablet, TAKE 1 TABLET BY MOUTH  DAILY, Disp: 90 tablet, Rfl: 1    tamsulosin (FLOMAX) 0.4 MG Head: Normocephalic and atraumatic. Neck: Neck supple. Cardiovascular: Normal rate and normal heart sounds. Pulmonary/Chest: Effort normal and breath sounds normal.   Musculoskeletal: Normal range of motion. Feet:    Neurological: He is alert. Skin: Skin is warm. Psychiatric: He has a normal mood and affect. Chemistry        Component Value Date/Time     2018 0852    K 5.1 2018 0852     2018 0852    CO2 27 2018 0852    BUN 30 (H) 2018 0852    CREATININE 1.43 (H) 2018 0852        Component Value Date/Time    CALCIUM 9.4 2018 0852    ALKPHOS 61 2017 1057    AST 22 2017 1057    ALT 22 2017 1057    BILITOT 0.5 2017 1057          Assessment:       Diagnosis Orders   1.  Uncontrolled type 2 diabetes mellitus with complication, without long-term current use of insulin ([facility])  POCT Glucose    Basic Metabolic Panel    Hemoglobin A1C   2. Stage 3 chronic kidney disease             Plan:      Orders Placed This Encounter   Procedures    Basic Metabolic Panel     Standing Status:   Future     Standing Expiration Date:   2019    Hemoglobin A1C     Standing Status:   Future     Standing Expiration Date:   2019    POCT Glucose     Add cycloset to onglyza  discussed side effects   Orders Placed This Encounter   Medications    Bromocriptine Mesylate (CYCLOSET) 0.8 MG TABS     Si-4 pills  po in am     Dispense:  360 tablet     Refill:  02

## 2018-08-23 ENCOUNTER — TELEPHONE (OUTPATIENT)
Dept: ENDOCRINOLOGY | Age: 83
End: 2018-08-23

## 2018-08-31 RX ORDER — GLIMEPIRIDE 1 MG/1
1 TABLET ORAL
Qty: 30 TABLET | Refills: 3 | Status: SHIPPED | OUTPATIENT
Start: 2018-08-31 | End: 2018-01-01 | Stop reason: SDUPTHER

## 2018-09-10 RX ORDER — SAXAGLIPTIN 2.5 MG/1
TABLET, FILM COATED ORAL
Qty: 90 TABLET | Refills: 1 | Status: SHIPPED | OUTPATIENT
Start: 2018-09-10 | End: 2018-01-01 | Stop reason: ALTCHOICE

## 2018-09-12 DIAGNOSIS — I50.9 CONGESTIVE HEART FAILURE, UNSPECIFIED HF CHRONICITY, UNSPECIFIED HEART FAILURE TYPE (HCC): ICD-10-CM

## 2018-09-12 DIAGNOSIS — I15.9 SECONDARY HYPERTENSION: ICD-10-CM

## 2018-09-12 RX ORDER — SPIRONOLACTONE 25 MG/1
TABLET ORAL
Qty: 90 TABLET | Refills: 3 | Status: CANCELLED | OUTPATIENT
Start: 2018-09-12

## 2018-09-13 DIAGNOSIS — I10 ESSENTIAL HYPERTENSION: ICD-10-CM

## 2018-09-13 RX ORDER — SPIRONOLACTONE 25 MG/1
TABLET ORAL
Qty: 90 TABLET | Refills: 3 | Status: SHIPPED | OUTPATIENT
Start: 2018-09-13 | End: 2019-01-01

## 2018-10-01 ENCOUNTER — HOSPITAL ENCOUNTER (OUTPATIENT)
Dept: CARDIOLOGY | Age: 83
Discharge: HOME | End: 2018-10-01
Payer: MEDICARE

## 2018-10-01 PROCEDURE — 93296 REM INTERROG EVL PM/IDS: CPT

## 2018-11-28 NOTE — PROGRESS NOTES
Feet:    Skin: Skin is warm. Psychiatric: He has a normal mood and affect. Results for Irvin Floyd (MRN [id number]) as of 11/28/2018 07:29   Ref. Range 11/20/2018 08:14 11/20/2018 11:13   Sodium Latest Ref Range: 132 - 144 mEq/L 140    Potassium Latest Ref Range: 3.5 - 5.1 mEq/L 5.5 (H)    Chloride Latest Ref Range: 98 - 107 mEq/L 104    CO2 Latest Ref Range: 22 - 29 mEq/L 24    BUN Latest Ref Range: 8 - 23 mg/dL 36 (H)    Creatinine Latest Ref Range: 0.70 - 1.20 mg/dL 1.78 (H)    Anion Gap Latest Ref Range: 7 - 13 mEq/L 12    GFR Non- Latest Ref Range: >60  36.3 (L)    GFR  Latest Ref Range: >60  43.9 (L)    Glucose Latest Ref Range: 74 - 109 mg/dL 84    Calcium Latest Ref Range: 8.6 - 10.2 mg/dL 10.1    Hemoglobin A1C Latest Ref Range: 4.8 - 5.9 %  7.0 (H)     Assessment:       Diagnosis Orders   1. Uncontrolled type 2 diabetes mellitus with hyperglycemia (HCC)  POCT Glucose    Basic Metabolic Panel    Hemoglobin A1C   2. Stage 3 chronic kidney disease (HCC)     3. Edema, unspecified type     4.  Onychomycosis             Plan:      Orders Placed This Encounter   Procedures    Basic Metabolic Panel     Standing Status:   Future     Standing Expiration Date:   11/26/2019    Hemoglobin A1C     Standing Status:   Future     Standing Expiration Date:   11/26/2019    POCT Glucose        continue onglyza 2.5 mg daily   Add Amaryl if glucose more than 150   Orders Placed This Encounter   Medications    DISCONTD: glimepiride (AMARYL) 1 MG tablet     Sig: TAKE IF GLUCOSE MORE THAN 150     Dispense:  30 tablet     Refill:  3    glimepiride (AMARYL) 1 MG tablet     Sig: TAKE IF GLUCOSE MORE THAN 150     Dispense:  30 tablet     Refill:  3             Maeve Morales MD

## 2018-12-17 PROBLEM — I42.0 DCM (DILATED CARDIOMYOPATHY) (HCC): Status: ACTIVE | Noted: 2018-01-01

## 2018-12-17 PROBLEM — I25.119 CORONARY ARTERY DISEASE INVOLVING NATIVE CORONARY ARTERY OF NATIVE HEART WITH ANGINA PECTORIS (HCC): Status: ACTIVE | Noted: 2018-01-01

## 2018-12-17 NOTE — PROGRESS NOTES
Subsequent Progress Note  Patient: Brandan Siomn  YOB: 1931  MRN: [id number]    Chief Complaint: more sob lately, [facility]   Chief Complaint   Patient presents with    6 Month Follow-Up    Congestive Heart Failure    Atrial Fibrillation    Shortness of Breath     INCREASED SX       CV Data:  2008 CRTD  EF 30-35  1/2018 SPect negative  1/2018 Echo EF 40% 1-2AR, 2-3 TR  12/2008 CATH Minimal CAD    Subjective/HPI: more sob no cp no falls no bleed takes meds.    Has not been taking Furosemide     EKG: Paced AF    Past Medical History:   Diagnosis Date    Anticoagulant long-term use     xarelto    Atrial fibrillation (HCC)     CAD (coronary artery disease)     Cardiomyopathy (HCC)     Chronic kidney disease     Hyperlipidemia     Hypertension     Osteoarthritis     Type II or unspecified type diabetes mellitus without mention of complication, not stated as uncontrolled        Past Surgical History:   Procedure Laterality Date    CORONARY ANGIOPLASTY      DIAGNOSTIC CARDIAC CATH LAB PROCEDURE      EYE SURGERY Bilateral 03/2018    PACEMAKER INSERTION N/A 1994    SKIN CANCER EXCISION  2016       Family History   Problem Relation Age of Onset    Cancer Mother     Coronary Art Dis Father     Coronary Art Dis Brother     Hypertension Brother     High Cholesterol Brother     Diabetes Brother        Social History     Social History    Marital status:      Spouse name: N/A    Number of children: N/A    Years of education: N/A     Social History Main Topics    Smoking status: Never Smoker    Smokeless tobacco: Never Used    Alcohol use No    Drug use: No    Sexual activity: Not Asked     Other Topics Concern    None     Social History Narrative    None       No Known Allergies    Current Outpatient Prescriptions   Medication Sig Dispense Refill    glimepiride (AMARYL) 1 MG tablet TAKE IF GLUCOSE MORE THAN 150 30 tablet 3    furosemide (LASIX) 40 MG tablet Take 1 tablet by Genitourinary: Negative. Musculoskeletal: Negative. Skin: Negative. Neurological: Negative. Negative for dizziness, syncope, weakness and light-headedness. Hematological: Negative. Psychiatric/Behavioral: Negative. Physical Examination:    BP (!) 112/58 (Site: Left Upper Arm, Position: Sitting, Cuff Size: Large Adult)   Pulse 64   Resp 14   Ht 6' 2\" (1.88 m)   Wt 236 lb 12.8 oz (107.4 kg)   SpO2 98%   BMI 30.40 kg/m²    Physical Exam   Constitutional: He appears healthy. No distress. HENT:   Normal cephalic and Atraumatic   Eyes: Pupils are equal, round, and reactive to light. Neck: Normal range of motion and thyroid normal. Neck supple. No JVD present. No neck adenopathy. No thyromegaly present. Cardiovascular: Normal rate, regular rhythm, intact distal pulses and normal pulses. Murmur heard. Pulmonary/Chest: Effort normal. He has no wheezes. He has bibasilar rales. He exhibits no tenderness. Abdominal: Soft. Bowel sounds are normal. There is no tenderness. Musculoskeletal: Normal range of motion. He exhibits edema (1+). He exhibits no tenderness. Neurological: He is alert and oriented to person, place, and time. Skin: Skin is warm. No cyanosis. Nails show no clubbing.        LABS:  CBC:   Lab Results   Component Value Date    WBC 5.4 06/08/2017    RBC 4.39 06/08/2017    HGB 13.8 06/08/2017    HCT 41.5 06/08/2017    MCV 94.6 06/08/2017    MCH 31.4 06/08/2017    MCHC 33.2 06/08/2017    RDW 14.3 06/08/2017     06/08/2017    MPV 12.3 02/09/2015     Lipids:  Lab Results   Component Value Date    CHOL 156 09/21/2016    CHOL 162 11/23/2015    CHOL 167 06/14/2012     Lab Results   Component Value Date    TRIG 84 09/21/2016    TRIG 112 11/23/2015    TRIG 111 06/14/2012     Lab Results   Component Value Date    HDL 47 09/21/2016    HDL 46 11/23/2015    HDL 47 06/14/2012     Lab Results   Component Value Date    LDLCALC 92 09/21/2016    LDLCALC 94 11/23/2015    [address] 06/14/2012     No results found for: LABVLDL, VLDL  Lab Results   Component Value Date    CHOLHDLRATIO 3.6 06/14/2012     CMP:    Lab Results   Component Value Date     11/20/2018    K 5.5 11/20/2018     11/20/2018    CO2 24 11/20/2018    BUN 36 11/20/2018    CREATININE 1.78 11/20/2018    GFRAA 43.9 11/20/2018    LABGLOM 36.3 11/20/2018    GLUCOSE 194 11/26/2018    GLUCOSE 141 12/30/2011    PROT 6.8 06/08/2017    LABALBU 4.0 06/08/2017    CALCIUM 10.1 11/20/2018    BILITOT 0.5 06/08/2017    ALKPHOS 61 06/08/2017    AST 22 06/08/2017    ALT 22 06/08/2017     BMP:    Lab Results   Component Value Date     11/20/2018    K 5.5 11/20/2018     11/20/2018    CO2 24 11/20/2018    BUN 36 11/20/2018    LABALBU 4.0 06/08/2017    CREATININE 1.78 11/20/2018    CALCIUM 10.1 11/20/2018    GFRAA 43.9 11/20/2018    LABGLOM 36.3 11/20/2018    GLUCOSE 194 11/26/2018    GLUCOSE 141 12/30/2011     Magnesium:  No results found for: MG  TSH:No results found for: TSHFT4, TSH    Patient Active Problem List   Diagnosis    Type II diabetes mellitus, uncontrolled [facility]    CHF (congestive heart failure)    BPH (benign prostatic hyperplasia)    Hypercholesteremia    CKD (chronic kidney disease) stage 3, GFR 30-59 ml/min (HCC)    Dystrophy of anterior cornea    Acute sinusitis    Basal cell carcinoma of nose    Retained foreign body of eyelid    Tear film insufficiency    Hypertension    Atrial fibrillation (HCC)    Anticoagulant long-term use    Cardiomyopathy ([address]    AICD (automatic cardioverter/defibrillator) present    DCM (dilated cardiomyopathy) ([facility]    Coronary artery disease involving native coronary artery of native heart with angina pectoris ([facility] [facility]       There are no discontinued medications. Modified Medications    No medications on file       No orders of the defined types were placed in this encounter. Assessment/Plan:    1. Essential hypertension   stable   - EKG 12 lead    2.

## 2019-01-01 ENCOUNTER — TELEPHONE (OUTPATIENT)
Dept: UROLOGY | Age: 84
End: 2019-01-01

## 2019-01-01 ENCOUNTER — HOSPITAL ENCOUNTER (OUTPATIENT)
Age: 84
Setting detail: OUTPATIENT SURGERY
Discharge: HOME | End: 2019-08-14
Attending: UROLOGY | Admitting: UROLOGY
Payer: MEDICARE

## 2019-01-01 ENCOUNTER — APPOINTMENT (OUTPATIENT)
Dept: GENERAL RADIOLOGY | Age: 84
DRG: 854 | End: 2019-01-01
Payer: MEDICARE

## 2019-01-01 ENCOUNTER — OFFICE VISIT (OUTPATIENT)
Dept: UROLOGY | Age: 84
End: 2019-01-01
Payer: MEDICARE

## 2019-01-01 ENCOUNTER — OFFICE VISIT (OUTPATIENT)
Dept: INFECTIOUS DISEASES | Age: 84
End: 2019-01-01
Payer: MEDICARE

## 2019-01-01 ENCOUNTER — ANESTHESIA EVENT (OUTPATIENT)
Dept: OPERATING ROOM | Age: 84
DRG: 854 | End: 2019-01-01
Payer: MEDICARE

## 2019-01-01 ENCOUNTER — OFFICE VISIT (OUTPATIENT)
Dept: ENDOCRINOLOGY | Age: 84
End: 2019-01-01
Payer: MEDICARE

## 2019-01-01 ENCOUNTER — APPOINTMENT (OUTPATIENT)
Dept: INTERVENTIONAL RADIOLOGY/VASCULAR | Age: 84
DRG: 854 | End: 2019-01-01
Payer: MEDICARE

## 2019-01-01 ENCOUNTER — HOSPITAL ENCOUNTER (OUTPATIENT)
Dept: CARDIOLOGY | Age: 84
Discharge: HOME | End: 2019-09-03
Payer: MEDICARE

## 2019-01-01 ENCOUNTER — TELEPHONE (OUTPATIENT)
Dept: INFECTIOUS DISEASES | Age: 84
End: 2019-01-01

## 2019-01-01 ENCOUNTER — HOSPITAL ENCOUNTER (OUTPATIENT)
Dept: CT IMAGING | Age: 84
Discharge: HOME | End: 2019-08-14
Payer: MEDICARE

## 2019-01-01 ENCOUNTER — ANESTHESIA (OUTPATIENT)
Dept: OPERATING ROOM | Age: 84
DRG: 854 | End: 2019-01-01
Payer: MEDICARE

## 2019-01-01 ENCOUNTER — TELEPHONE (OUTPATIENT)
Dept: CARDIOLOGY CLINIC | Age: 84
End: 2019-01-01

## 2019-01-01 ENCOUNTER — APPOINTMENT (OUTPATIENT)
Dept: ULTRASOUND IMAGING | Age: 84
DRG: 854 | End: 2019-01-01
Payer: MEDICARE

## 2019-01-01 ENCOUNTER — APPOINTMENT (OUTPATIENT)
Dept: GENERAL RADIOLOGY | Age: 84
End: 2019-01-01
Payer: MEDICARE

## 2019-01-01 ENCOUNTER — OFFICE VISIT (OUTPATIENT)
Dept: CARDIOLOGY CLINIC | Age: 84
End: 2019-01-01
Payer: MEDICARE

## 2019-01-01 ENCOUNTER — HOSPITAL ENCOUNTER (OUTPATIENT)
Dept: GENERAL RADIOLOGY | Age: 84
Discharge: HOME | End: 2019-06-21
Payer: COMMERCIAL

## 2019-01-01 ENCOUNTER — HOSPITAL ENCOUNTER (EMERGENCY)
Age: 84
Discharge: ANOTHER HOSPITAL | End: 2019-03-28
Attending: EMERGENCY MEDICINE
Payer: MEDICARE

## 2019-01-01 ENCOUNTER — HOSPITAL ENCOUNTER (INPATIENT)
Age: 84
LOS: 6 days | Discharge: NURSING, REHAB OR LONG-TERM CARE | DRG: 854 | End: 2019-06-10
Attending: INTERNAL MEDICINE | Admitting: INTERNAL MEDICINE
Payer: MEDICARE

## 2019-01-01 ENCOUNTER — APPOINTMENT (OUTPATIENT)
Dept: CT IMAGING | Age: 84
DRG: 854 | End: 2019-01-01
Payer: MEDICARE

## 2019-01-01 ENCOUNTER — OFFICE VISIT (OUTPATIENT)
Dept: PULMONOLOGY | Age: 84
End: 2019-01-01
Payer: MEDICARE

## 2019-01-01 ENCOUNTER — APPOINTMENT (OUTPATIENT)
Dept: CT IMAGING | Age: 84
End: 2019-01-01
Payer: MEDICARE

## 2019-01-01 ENCOUNTER — ANESTHESIA (OUTPATIENT)
Dept: OPERATING ROOM | Age: 84
End: 2019-01-01
Payer: MEDICARE

## 2019-01-01 ENCOUNTER — ANESTHESIA EVENT (OUTPATIENT)
Dept: OPERATING ROOM | Age: 84
End: 2019-01-01
Payer: MEDICARE

## 2019-01-01 ENCOUNTER — HOSPITAL ENCOUNTER (OUTPATIENT)
Dept: CT IMAGING | Age: 84
Discharge: HOME | End: 2019-06-19
Payer: MEDICARE

## 2019-01-01 VITALS
SYSTOLIC BLOOD PRESSURE: 116 MMHG | HEART RATE: 62 BPM | BODY MASS INDEX: 27.98 KG/M2 | HEIGHT: 74 IN | WEIGHT: 218 LBS | DIASTOLIC BLOOD PRESSURE: 64 MMHG

## 2019-01-01 VITALS
HEIGHT: 74 IN | HEART RATE: 64 BPM | BODY MASS INDEX: 28.49 KG/M2 | WEIGHT: 222 LBS | DIASTOLIC BLOOD PRESSURE: 64 MMHG | SYSTOLIC BLOOD PRESSURE: 122 MMHG

## 2019-01-01 VITALS
HEIGHT: 74 IN | DIASTOLIC BLOOD PRESSURE: 78 MMHG | BODY MASS INDEX: 29.26 KG/M2 | WEIGHT: 228 LBS | HEART RATE: 73 BPM | SYSTOLIC BLOOD PRESSURE: 127 MMHG

## 2019-01-01 VITALS
HEART RATE: 80 BPM | SYSTOLIC BLOOD PRESSURE: 130 MMHG | DIASTOLIC BLOOD PRESSURE: 74 MMHG | TEMPERATURE: 98.4 F | WEIGHT: 227 LBS | RESPIRATION RATE: 16 BRPM | OXYGEN SATURATION: 95 % | HEIGHT: 74 IN | BODY MASS INDEX: 29.13 KG/M2

## 2019-01-01 VITALS
BODY MASS INDEX: 29.07 KG/M2 | RESPIRATION RATE: 14 BRPM | DIASTOLIC BLOOD PRESSURE: 62 MMHG | HEIGHT: 74 IN | OXYGEN SATURATION: 99 % | HEART RATE: 75 BPM | WEIGHT: 226.5 LBS | SYSTOLIC BLOOD PRESSURE: 108 MMHG

## 2019-01-01 VITALS
WEIGHT: 233 LBS | SYSTOLIC BLOOD PRESSURE: 118 MMHG | RESPIRATION RATE: 16 BRPM | HEIGHT: 74 IN | DIASTOLIC BLOOD PRESSURE: 74 MMHG | HEART RATE: 76 BPM | BODY MASS INDEX: 29.9 KG/M2 | OXYGEN SATURATION: 98 %

## 2019-01-01 VITALS
WEIGHT: 220 LBS | HEIGHT: 75 IN | BODY MASS INDEX: 27.35 KG/M2 | HEART RATE: 66 BPM | DIASTOLIC BLOOD PRESSURE: 72 MMHG | SYSTOLIC BLOOD PRESSURE: 136 MMHG

## 2019-01-01 VITALS
HEIGHT: 74 IN | BODY MASS INDEX: 29.65 KG/M2 | SYSTOLIC BLOOD PRESSURE: 118 MMHG | RESPIRATION RATE: 16 BRPM | HEART RATE: 74 BPM | OXYGEN SATURATION: 98 % | DIASTOLIC BLOOD PRESSURE: 64 MMHG | WEIGHT: 231 LBS

## 2019-01-01 VITALS
WEIGHT: 264 LBS | TEMPERATURE: 97.5 F | SYSTOLIC BLOOD PRESSURE: 162 MMHG | HEART RATE: 59 BPM | RESPIRATION RATE: 18 BRPM | DIASTOLIC BLOOD PRESSURE: 82 MMHG | HEIGHT: 74 IN | OXYGEN SATURATION: 100 % | BODY MASS INDEX: 33.88 KG/M2

## 2019-01-01 VITALS
SYSTOLIC BLOOD PRESSURE: 123 MMHG | TEMPERATURE: 97.6 F | DIASTOLIC BLOOD PRESSURE: 72 MMHG | HEART RATE: 64 BPM | BODY MASS INDEX: 28.5 KG/M2 | RESPIRATION RATE: 18 BRPM | HEIGHT: 74 IN

## 2019-01-01 VITALS
DIASTOLIC BLOOD PRESSURE: 70 MMHG | WEIGHT: 225 LBS | BODY MASS INDEX: 28.88 KG/M2 | HEART RATE: 61 BPM | HEIGHT: 74 IN | SYSTOLIC BLOOD PRESSURE: 120 MMHG

## 2019-01-01 VITALS
SYSTOLIC BLOOD PRESSURE: 122 MMHG | HEIGHT: 74 IN | HEART RATE: 61 BPM | WEIGHT: 214 LBS | DIASTOLIC BLOOD PRESSURE: 72 MMHG | BODY MASS INDEX: 27.46 KG/M2

## 2019-01-01 VITALS
HEIGHT: 74 IN | WEIGHT: 214 LBS | SYSTOLIC BLOOD PRESSURE: 152 MMHG | RESPIRATION RATE: 18 BRPM | HEART RATE: 60 BPM | OXYGEN SATURATION: 97 % | BODY MASS INDEX: 27.46 KG/M2 | TEMPERATURE: 97.9 F | DIASTOLIC BLOOD PRESSURE: 82 MMHG

## 2019-01-01 VITALS
OXYGEN SATURATION: 97 % | RESPIRATION RATE: 16 BRPM | HEART RATE: 76 BPM | SYSTOLIC BLOOD PRESSURE: 142 MMHG | BODY MASS INDEX: 28.88 KG/M2 | HEIGHT: 74 IN | DIASTOLIC BLOOD PRESSURE: 74 MMHG | WEIGHT: 225 LBS

## 2019-01-01 VITALS
HEART RATE: 62 BPM | OXYGEN SATURATION: 96 % | HEIGHT: 74 IN | RESPIRATION RATE: 18 BRPM | DIASTOLIC BLOOD PRESSURE: 78 MMHG | WEIGHT: 228 LBS | BODY MASS INDEX: 29.26 KG/M2 | TEMPERATURE: 98.2 F | SYSTOLIC BLOOD PRESSURE: 131 MMHG

## 2019-01-01 VITALS
BODY MASS INDEX: 30.03 KG/M2 | SYSTOLIC BLOOD PRESSURE: 100 MMHG | WEIGHT: 234 LBS | HEIGHT: 74 IN | HEART RATE: 82 BPM | DIASTOLIC BLOOD PRESSURE: 64 MMHG

## 2019-01-01 VITALS
SYSTOLIC BLOOD PRESSURE: 142 MMHG | OXYGEN SATURATION: 100 % | RESPIRATION RATE: 18 BRPM | DIASTOLIC BLOOD PRESSURE: 74 MMHG

## 2019-01-01 VITALS — SYSTOLIC BLOOD PRESSURE: 108 MMHG | TEMPERATURE: 98.1 F | OXYGEN SATURATION: 100 % | DIASTOLIC BLOOD PRESSURE: 64 MMHG

## 2019-01-01 VITALS
HEIGHT: 74 IN | DIASTOLIC BLOOD PRESSURE: 75 MMHG | BODY MASS INDEX: 28.89 KG/M2 | OXYGEN SATURATION: 98 % | HEART RATE: 62 BPM | SYSTOLIC BLOOD PRESSURE: 128 MMHG | RESPIRATION RATE: 18 BRPM

## 2019-01-01 VITALS
WEIGHT: 216 LBS | BODY MASS INDEX: 27.72 KG/M2 | HEART RATE: 70 BPM | HEIGHT: 74 IN | SYSTOLIC BLOOD PRESSURE: 120 MMHG | DIASTOLIC BLOOD PRESSURE: 80 MMHG

## 2019-01-01 DIAGNOSIS — I10 ESSENTIAL HYPERTENSION: Chronic | ICD-10-CM

## 2019-01-01 DIAGNOSIS — E11.65 UNCONTROLLED TYPE 2 DIABETES MELLITUS WITH HYPERGLYCEMIA (HCC): ICD-10-CM

## 2019-01-01 DIAGNOSIS — N18.30 STAGE 3 CHRONIC KIDNEY DISEASE (HCC): ICD-10-CM

## 2019-01-01 DIAGNOSIS — I25.119 CORONARY ARTERY DISEASE INVOLVING NATIVE CORONARY ARTERY OF NATIVE HEART WITH ANGINA PECTORIS (HCC): ICD-10-CM

## 2019-01-01 DIAGNOSIS — Z95.810 AICD (AUTOMATIC CARDIOVERTER/DEFIBRILLATOR) PRESENT: ICD-10-CM

## 2019-01-01 DIAGNOSIS — A41.59 KLEBSIELLA SEPSIS (HCC): ICD-10-CM

## 2019-01-01 DIAGNOSIS — E11.65 UNCONTROLLED TYPE 2 DIABETES MELLITUS WITH HYPERGLYCEMIA (HCC): Primary | ICD-10-CM

## 2019-01-01 DIAGNOSIS — N40.1 BPH WITH OBSTRUCTION/LOWER URINARY TRACT SYMPTOMS: ICD-10-CM

## 2019-01-01 DIAGNOSIS — Z95.810 AICD (AUTOMATIC CARDIOVERTER/DEFIBRILLATOR) PRESENT: Primary | ICD-10-CM

## 2019-01-01 DIAGNOSIS — N20.1 URETERAL CALCULUS, LEFT: Primary | ICD-10-CM

## 2019-01-01 DIAGNOSIS — R33.9 URINARY RETENTION: ICD-10-CM

## 2019-01-01 DIAGNOSIS — I50.9 CONGESTIVE HEART FAILURE, UNSPECIFIED HF CHRONICITY, UNSPECIFIED HEART FAILURE TYPE (HCC): Primary | Chronic | ICD-10-CM

## 2019-01-01 DIAGNOSIS — A49.8 KLEBSIELLA INFECTION: ICD-10-CM

## 2019-01-01 DIAGNOSIS — N12 PYELONEPHRITIS: ICD-10-CM

## 2019-01-01 DIAGNOSIS — N20.1 URETERAL CALCULUS, LEFT: ICD-10-CM

## 2019-01-01 DIAGNOSIS — R90.89 MIDLINE SHIFT OF BRAIN: ICD-10-CM

## 2019-01-01 DIAGNOSIS — A41.50 GRAM NEGATIVE SEPSIS (HCC): ICD-10-CM

## 2019-01-01 DIAGNOSIS — N17.9 AKI (ACUTE KIDNEY INJURY) (HCC): ICD-10-CM

## 2019-01-01 DIAGNOSIS — N12 PYELONEPHRITIS: Primary | ICD-10-CM

## 2019-01-01 DIAGNOSIS — I50.9 CONGESTIVE HEART FAILURE, UNSPECIFIED HF CHRONICITY, UNSPECIFIED HEART FAILURE TYPE (HCC): Chronic | ICD-10-CM

## 2019-01-01 DIAGNOSIS — G47.33 OSA ON CPAP: Primary | ICD-10-CM

## 2019-01-01 DIAGNOSIS — N20.0 KIDNEY STONE: ICD-10-CM

## 2019-01-01 DIAGNOSIS — N39.0 SEPSIS DUE TO URINARY TRACT INFECTION (HCC): ICD-10-CM

## 2019-01-01 DIAGNOSIS — E78.00 HYPERCHOLESTEREMIA: Chronic | ICD-10-CM

## 2019-01-01 DIAGNOSIS — N47.2 PARAPHIMOSIS: ICD-10-CM

## 2019-01-01 DIAGNOSIS — I42.0 DCM (DILATED CARDIOMYOPATHY) (HCC): ICD-10-CM

## 2019-01-01 DIAGNOSIS — R33.9 URINARY RETENTION: Primary | ICD-10-CM

## 2019-01-01 DIAGNOSIS — N20.0 NEPHROLITHIASIS: ICD-10-CM

## 2019-01-01 DIAGNOSIS — I48.20 CHRONIC ATRIAL FIBRILLATION (HCC): Chronic | ICD-10-CM

## 2019-01-01 DIAGNOSIS — N20.1 LEFT URETERAL STONE: Primary | ICD-10-CM

## 2019-01-01 DIAGNOSIS — N20.0 KIDNEY STONE: Primary | ICD-10-CM

## 2019-01-01 DIAGNOSIS — I10 ESSENTIAL HYPERTENSION: Primary | Chronic | ICD-10-CM

## 2019-01-01 DIAGNOSIS — I42.9 CARDIOMYOPATHY, UNSPECIFIED TYPE (HCC): ICD-10-CM

## 2019-01-01 DIAGNOSIS — N40.1 BPH WITH OBSTRUCTION/LOWER URINARY TRACT SYMPTOMS: Primary | ICD-10-CM

## 2019-01-01 DIAGNOSIS — N13.8 BPH WITH OBSTRUCTION/LOWER URINARY TRACT SYMPTOMS: ICD-10-CM

## 2019-01-01 DIAGNOSIS — E11.65 UNCONTROLLED TYPE 2 DIABETES MELLITUS WITH HYPERGLYCEMIA (HCC): Primary | Chronic | ICD-10-CM

## 2019-01-01 DIAGNOSIS — Z99.89 OSA ON CPAP: Primary | ICD-10-CM

## 2019-01-01 DIAGNOSIS — D72.829 LEUKOCYTOSIS, UNSPECIFIED TYPE: ICD-10-CM

## 2019-01-01 DIAGNOSIS — R33.9 RETENTION OF URINE: ICD-10-CM

## 2019-01-01 DIAGNOSIS — R27.0 ATAXIA: ICD-10-CM

## 2019-01-01 DIAGNOSIS — N39.0 COMPLICATED UTI (URINARY TRACT INFECTION): Primary | ICD-10-CM

## 2019-01-01 DIAGNOSIS — E66.3 OVERWEIGHT (BMI 25.0-29.9): ICD-10-CM

## 2019-01-01 DIAGNOSIS — S06.5XAA BILATERAL SUBDURAL HEMATOMAS: Primary | ICD-10-CM

## 2019-01-01 DIAGNOSIS — N13.8 BPH WITH OBSTRUCTION/LOWER URINARY TRACT SYMPTOMS: Primary | ICD-10-CM

## 2019-01-01 DIAGNOSIS — N20.1 LEFT URETERAL STONE: ICD-10-CM

## 2019-01-01 DIAGNOSIS — R53.83 FATIGUE, UNSPECIFIED TYPE: ICD-10-CM

## 2019-01-01 DIAGNOSIS — A41.9 SEPSIS DUE TO URINARY TRACT INFECTION (HCC): ICD-10-CM

## 2019-01-01 DIAGNOSIS — N39.0 COMPLICATED UTI (URINARY TRACT INFECTION): ICD-10-CM

## 2019-01-01 LAB
ALBUMIN SERPL-MCNC: 3.2 G/DL (ref 3.5–4.6)
ALBUMIN SERPL-MCNC: 3.9 G/DL (ref 3.5–4.6)
ALP BLD-CCNC: 107 U/L (ref 35–104)
ALP BLD-CCNC: 62 U/L (ref 35–104)
ALT SERPL-CCNC: 25 U/L (ref 0–41)
ALT SERPL-CCNC: 25 U/L (ref 0–41)
ANION GAP SERPL CALCULATED.3IONS-SCNC: 13 MEQ/L (ref 9–15)
ANION GAP SERPL CALCULATED.3IONS-SCNC: 13 MEQ/L (ref 9–15)
ANION GAP SERPL CALCULATED.3IONS-SCNC: 14 MEQ/L (ref 7–13)
ANION GAP SERPL CALCULATED.3IONS-SCNC: 14 MEQ/L (ref 9–15)
ANION GAP SERPL CALCULATED.3IONS-SCNC: 15 MEQ/L (ref 9–15)
ANION GAP SERPL CALCULATED.3IONS-SCNC: 16 MEQ/L (ref 9–15)
ANION GAP SERPL CALCULATED.3IONS-SCNC: 16 MEQ/L (ref 9–15)
ANION GAP SERPL CALCULATED.3IONS-SCNC: 17 MEQ/L (ref 7–13)
ANION GAP SERPL CALCULATED.3IONS-SCNC: 17 MEQ/L (ref 9–15)
ANION GAP SERPL CALCULATED.3IONS-SCNC: 17 MEQ/L (ref 9–15)
ANISOCYTOSIS: ABNORMAL
ANISOCYTOSIS: ABNORMAL
AST SERPL-CCNC: 18 U/L (ref 0–40)
AST SERPL-CCNC: 32 U/L (ref 0–40)
BACTERIA: ABNORMAL /HPF
BACTERIA: NEGATIVE /HPF
BANDED NEUTROPHILS RELATIVE PERCENT: 1 %
BASOPHILS ABSOLUTE: 0 K/UL (ref 0–0.2)
BASOPHILS ABSOLUTE: 0.1 K/UL (ref 0–0.2)
BASOPHILS RELATIVE PERCENT: 0.1 %
BASOPHILS RELATIVE PERCENT: 0.1 %
BASOPHILS RELATIVE PERCENT: 0.2 %
BASOPHILS RELATIVE PERCENT: 0.4 %
BASOPHILS RELATIVE PERCENT: 0.5 %
BASOPHILS RELATIVE PERCENT: 0.6 %
BASOPHILS RELATIVE PERCENT: 1 %
BILIRUB SERPL-MCNC: 0.7 MG/DL (ref 0.2–0.7)
BILIRUB SERPL-MCNC: 1 MG/DL (ref 0.2–0.7)
BILIRUBIN URINE: NEGATIVE
BILIRUBIN URINE: NEGATIVE
BLOOD CULTURE, ROUTINE: ABNORMAL
BLOOD CULTURE, ROUTINE: ABNORMAL
BLOOD CULTURE, ROUTINE: NORMAL
BLOOD CULTURE, ROUTINE: NORMAL
BLOOD, URINE: ABNORMAL
BLOOD, URINE: ABNORMAL
BUN BLDV-MCNC: 27 MG/DL (ref 8–23)
BUN BLDV-MCNC: 35 MG/DL (ref 8–23)
BUN BLDV-MCNC: 37 MG/DL (ref 8–23)
BUN BLDV-MCNC: 38 MG/DL (ref 8–23)
BUN BLDV-MCNC: 38 MG/DL (ref 8–23)
BUN BLDV-MCNC: 42 MG/DL (ref 8–23)
BUN BLDV-MCNC: 45 MG/DL (ref 8–23)
BUN BLDV-MCNC: 45 MG/DL (ref 8–23)
BUN BLDV-MCNC: 54 MG/DL (ref 8–23)
BUN BLDV-MCNC: 57 MG/DL (ref 8–23)
BUN BLDV-MCNC: 62 MG/DL (ref 8–23)
BUN BLDV-MCNC: 67 MG/DL (ref 8–23)
BUN BLDV-MCNC: 86 MG/DL (ref 8–23)
CALCIUM SERPL-MCNC: 7.2 MG/DL (ref 8.5–9.9)
CALCIUM SERPL-MCNC: 7.6 MG/DL (ref 8.5–9.9)
CALCIUM SERPL-MCNC: 7.7 MG/DL (ref 8.5–9.9)
CALCIUM SERPL-MCNC: 7.8 MG/DL (ref 8.5–9.9)
CALCIUM SERPL-MCNC: 8 MG/DL (ref 8.5–9.9)
CALCIUM SERPL-MCNC: 8.2 MG/DL (ref 8.5–9.9)
CALCIUM SERPL-MCNC: 8.8 MG/DL (ref 8.5–9.9)
CALCIUM SERPL-MCNC: 9.1 MG/DL (ref 8.5–9.9)
CALCIUM SERPL-MCNC: 9.2 MG/DL (ref 8.6–10.2)
CALCIUM SERPL-MCNC: 9.3 MG/DL (ref 8.5–9.9)
CALCIUM SERPL-MCNC: 9.4 MG/DL (ref 8.5–9.9)
CALCIUM SERPL-MCNC: 9.4 MG/DL (ref 8.6–10.2)
CALCIUM SERPL-MCNC: 9.5 MG/DL (ref 8.5–9.9)
CHLORIDE BLD-SCNC: 100 MEQ/L (ref 95–107)
CHLORIDE BLD-SCNC: 100 MEQ/L (ref 95–107)
CHLORIDE BLD-SCNC: 102 MEQ/L (ref 95–107)
CHLORIDE BLD-SCNC: 102 MEQ/L (ref 95–107)
CHLORIDE BLD-SCNC: 102 MEQ/L (ref 98–107)
CHLORIDE BLD-SCNC: 104 MEQ/L (ref 95–107)
CHLORIDE BLD-SCNC: 104 MEQ/L (ref 95–107)
CHLORIDE BLD-SCNC: 105 MEQ/L (ref 95–107)
CHLORIDE BLD-SCNC: 105 MEQ/L (ref 95–107)
CHLORIDE BLD-SCNC: 93 MEQ/L (ref 95–107)
CHLORIDE BLD-SCNC: 96 MEQ/L (ref 95–107)
CHLORIDE BLD-SCNC: 97 MEQ/L (ref 98–107)
CHLORIDE BLD-SCNC: 98 MEQ/L (ref 95–107)
CHP ED QC CHECK: NORMAL
CLARITY: ABNORMAL
CLARITY: CLEAR
CO2: 15 MEQ/L (ref 20–31)
CO2: 18 MEQ/L (ref 20–31)
CO2: 18 MEQ/L (ref 20–31)
CO2: 20 MEQ/L (ref 20–31)
CO2: 23 MEQ/L (ref 20–31)
CO2: 23 MEQ/L (ref 20–31)
CO2: 23 MEQ/L (ref 22–29)
CO2: 24 MEQ/L (ref 20–31)
CO2: 24 MEQ/L (ref 20–31)
CO2: 26 MEQ/L (ref 20–31)
CO2: 26 MEQ/L (ref 22–29)
COLOR: ABNORMAL
COLOR: ABNORMAL
CREAT SERPL-MCNC: 1.28 MG/DL (ref 0.7–1.2)
CREAT SERPL-MCNC: 1.41 MG/DL (ref 0.7–1.2)
CREAT SERPL-MCNC: 1.71 MG/DL (ref 0.7–1.2)
CREAT SERPL-MCNC: 1.87 MG/DL (ref 0.7–1.2)
CREAT SERPL-MCNC: 1.89 MG/DL (ref 0.7–1.2)
CREAT SERPL-MCNC: 1.89 MG/DL (ref 0.7–1.2)
CREAT SERPL-MCNC: 2.24 MG/DL (ref 0.7–1.2)
CREAT SERPL-MCNC: 2.53 MG/DL (ref 0.7–1.2)
CREAT SERPL-MCNC: 2.87 MG/DL (ref 0.7–1.2)
CREAT SERPL-MCNC: 2.89 MG/DL (ref 0.7–1.2)
CREAT SERPL-MCNC: 3.13 MG/DL (ref 0.7–1.2)
CREAT SERPL-MCNC: 3.54 MG/DL (ref 0.7–1.2)
CREAT SERPL-MCNC: 3.66 MG/DL (ref 0.7–1.2)
CREATININE URINE: 178.6 MG/DL
CULTURE, BLOOD 2: ABNORMAL
CULTURE, BLOOD 2: ABNORMAL
CULTURE, BLOOD 2: NORMAL
EKG ATRIAL RATE: 119 BPM
EKG ATRIAL RATE: 300 BPM
EKG ATRIAL RATE: 69 BPM
EKG Q-T INTERVAL: 462 MS
EKG Q-T INTERVAL: 482 MS
EKG Q-T INTERVAL: 504 MS
EKG QRS DURATION: 136 MS
EKG QRS DURATION: 142 MS
EKG QRS DURATION: 148 MS
EKG QTC CALCULATION (BAZETT): 468 MS
EKG QTC CALCULATION (BAZETT): 504 MS
EKG QTC CALCULATION (BAZETT): 509 MS
EKG R AXIS: 100 DEGREES
EKG R AXIS: 126 DEGREES
EKG R AXIS: 97 DEGREES
EKG T AXIS: 135 DEGREES
EKG T AXIS: 79 DEGREES
EKG T AXIS: 93 DEGREES
EKG VENTRICULAR RATE: 60 BPM
EKG VENTRICULAR RATE: 62 BPM
EKG VENTRICULAR RATE: 67 BPM
EOSINOPHILS ABSOLUTE: 0 K/UL (ref 0–0.7)
EOSINOPHILS ABSOLUTE: 0 K/UL (ref 0–0.7)
EOSINOPHILS ABSOLUTE: 0.1 K/UL (ref 0–0.7)
EOSINOPHILS ABSOLUTE: 0.2 K/UL (ref 0–0.7)
EOSINOPHILS ABSOLUTE: 0.2 K/UL (ref 0–0.7)
EOSINOPHILS ABSOLUTE: 0.3 K/UL (ref 0–0.7)
EOSINOPHILS ABSOLUTE: 0.4 K/UL (ref 0–0.7)
EOSINOPHILS RELATIVE PERCENT: 0 %
EOSINOPHILS RELATIVE PERCENT: 0.3 %
EOSINOPHILS RELATIVE PERCENT: 1.1 %
EOSINOPHILS RELATIVE PERCENT: 1.7 %
EOSINOPHILS RELATIVE PERCENT: 2.8 %
EOSINOPHILS RELATIVE PERCENT: 3 %
EOSINOPHILS RELATIVE PERCENT: 3.5 %
EPITHELIAL CELLS, UA: ABNORMAL /HPF (ref 0–5)
EPITHELIAL CELLS, UA: ABNORMAL /HPF (ref 0–5)
GFR AFRICAN AMERICAN: 19.1
GFR AFRICAN AMERICAN: 19.9
GFR AFRICAN AMERICAN: 22.9
GFR AFRICAN AMERICAN: 25.1
GFR AFRICAN AMERICAN: 25.3
GFR AFRICAN AMERICAN: 29.2
GFR AFRICAN AMERICAN: 33.7
GFR AFRICAN AMERICAN: 41
GFR AFRICAN AMERICAN: 41
GFR AFRICAN AMERICAN: 41.5
GFR AFRICAN AMERICAN: 46
GFR AFRICAN AMERICAN: 57.5
GFR AFRICAN AMERICAN: >60
GFR NON-AFRICAN AMERICAN: 15.8
GFR NON-AFRICAN AMERICAN: 16.4
GFR NON-AFRICAN AMERICAN: 18.9
GFR NON-AFRICAN AMERICAN: 20.8
GFR NON-AFRICAN AMERICAN: 20.9
GFR NON-AFRICAN AMERICAN: 24.2
GFR NON-AFRICAN AMERICAN: 27.8
GFR NON-AFRICAN AMERICAN: 33.8
GFR NON-AFRICAN AMERICAN: 33.9
GFR NON-AFRICAN AMERICAN: 34.3
GFR NON-AFRICAN AMERICAN: 38
GFR NON-AFRICAN AMERICAN: 47.5
GFR NON-AFRICAN AMERICAN: 53
GLOBULIN: 3.5 G/DL (ref 2.3–3.5)
GLOBULIN: 3.7 G/DL (ref 2.3–3.5)
GLUCOSE BLD-MCNC: 101 MG/DL (ref 60–115)
GLUCOSE BLD-MCNC: 104 MG/DL (ref 60–115)
GLUCOSE BLD-MCNC: 107 MG/DL (ref 60–115)
GLUCOSE BLD-MCNC: 109 MG/DL (ref 60–115)
GLUCOSE BLD-MCNC: 111 MG/DL (ref 70–99)
GLUCOSE BLD-MCNC: 112 MG/DL (ref 60–115)
GLUCOSE BLD-MCNC: 115 MG/DL (ref 60–115)
GLUCOSE BLD-MCNC: 115 MG/DL (ref 70–99)
GLUCOSE BLD-MCNC: 116 MG/DL (ref 70–99)
GLUCOSE BLD-MCNC: 118 MG/DL (ref 70–99)
GLUCOSE BLD-MCNC: 118 MG/DL (ref 70–99)
GLUCOSE BLD-MCNC: 123 MG/DL (ref 60–115)
GLUCOSE BLD-MCNC: 125 MG/DL (ref 60–115)
GLUCOSE BLD-MCNC: 125 MG/DL (ref 60–115)
GLUCOSE BLD-MCNC: 127 MG/DL (ref 60–115)
GLUCOSE BLD-MCNC: 127 MG/DL (ref 70–99)
GLUCOSE BLD-MCNC: 137 MG/DL (ref 60–115)
GLUCOSE BLD-MCNC: 138 MG/DL (ref 74–109)
GLUCOSE BLD-MCNC: 140 MG/DL (ref 70–99)
GLUCOSE BLD-MCNC: 140 MG/DL (ref 70–99)
GLUCOSE BLD-MCNC: 142 MG/DL (ref 60–115)
GLUCOSE BLD-MCNC: 142 MG/DL (ref 70–99)
GLUCOSE BLD-MCNC: 145 MG/DL (ref 60–115)
GLUCOSE BLD-MCNC: 148 MG/DL (ref 60–115)
GLUCOSE BLD-MCNC: 148 MG/DL (ref 60–115)
GLUCOSE BLD-MCNC: 150 MG/DL (ref 60–115)
GLUCOSE BLD-MCNC: 152 MG/DL (ref 60–115)
GLUCOSE BLD-MCNC: 153 MG/DL (ref 60–115)
GLUCOSE BLD-MCNC: 157 MG/DL (ref 60–115)
GLUCOSE BLD-MCNC: 161 MG/DL (ref 60–115)
GLUCOSE BLD-MCNC: 170 MG/DL (ref 70–99)
GLUCOSE BLD-MCNC: 172 MG/DL (ref 60–115)
GLUCOSE BLD-MCNC: 177 MG/DL (ref 60–115)
GLUCOSE BLD-MCNC: 177 MG/DL (ref 60–115)
GLUCOSE BLD-MCNC: 181 MG/DL (ref 60–115)
GLUCOSE BLD-MCNC: 181 MG/DL (ref 74–109)
GLUCOSE BLD-MCNC: 188 MG/DL (ref 60–115)
GLUCOSE BLD-MCNC: 211 MG/DL (ref 60–115)
GLUCOSE BLD-MCNC: 215 MG/DL (ref 60–115)
GLUCOSE BLD-MCNC: 230 MG/DL (ref 70–99)
GLUCOSE BLD-MCNC: 233 MG/DL (ref 60–115)
GLUCOSE BLD-MCNC: 246 MG/DL
GLUCOSE BLD-MCNC: 282 MG/DL
GLUCOSE URINE: NEGATIVE MG/DL
GLUCOSE URINE: NEGATIVE MG/DL
HBA1C MFR BLD: 6.3 %
HBA1C MFR BLD: 7.9 % (ref 4.8–5.9)
HCT VFR BLD CALC: 28.2 % (ref 42–52)
HCT VFR BLD CALC: 28.5 % (ref 42–52)
HCT VFR BLD CALC: 28.8 % (ref 42–52)
HCT VFR BLD CALC: 30.3 % (ref 42–52)
HCT VFR BLD CALC: 30.5 % (ref 42–52)
HCT VFR BLD CALC: 31.3 % (ref 42–52)
HCT VFR BLD CALC: 32.2 % (ref 42–52)
HCT VFR BLD CALC: 35.3 % (ref 42–52)
HCT VFR BLD CALC: 38.5 % (ref 42–52)
HCT VFR BLD CALC: 39.9 % (ref 42–52)
HCT VFR BLD CALC: 42.9 % (ref 42–52)
HEMOGLOBIN: 10.1 G/DL (ref 14–18)
HEMOGLOBIN: 10.2 G/DL (ref 14–18)
HEMOGLOBIN: 10.5 G/DL (ref 14–18)
HEMOGLOBIN: 10.8 G/DL (ref 14–18)
HEMOGLOBIN: 11.6 G/DL (ref 14–18)
HEMOGLOBIN: 13.1 G/DL (ref 14–18)
HEMOGLOBIN: 13.3 G/DL (ref 14–18)
HEMOGLOBIN: 14.7 G/DL (ref 14–18)
HEMOGLOBIN: 9.2 G/DL (ref 14–18)
HEMOGLOBIN: 9.3 G/DL (ref 14–18)
HEMOGLOBIN: 9.8 G/DL (ref 14–18)
HYALINE CASTS: ABNORMAL /HPF (ref 0–5)
HYALINE CASTS: ABNORMAL /HPF (ref 0–5)
INR BLD: 1.3
KETONES, URINE: NEGATIVE MG/DL
KETONES, URINE: NEGATIVE MG/DL
LACTIC ACID: 1.2 MMOL/L (ref 0.5–2.2)
LEUKOCYTE ESTERASE, URINE: ABNORMAL
LEUKOCYTE ESTERASE, URINE: ABNORMAL
LYMPHOCYTES ABSOLUTE: 0.2 K/UL (ref 1–4.8)
LYMPHOCYTES ABSOLUTE: 0.3 K/UL (ref 1–4.8)
LYMPHOCYTES ABSOLUTE: 0.3 K/UL (ref 1–4.8)
LYMPHOCYTES ABSOLUTE: 0.4 K/UL (ref 1–4.8)
LYMPHOCYTES ABSOLUTE: 0.4 K/UL (ref 1–4.8)
LYMPHOCYTES ABSOLUTE: 0.5 K/UL (ref 1–4.8)
LYMPHOCYTES ABSOLUTE: 0.5 K/UL (ref 1–4.8)
LYMPHOCYTES RELATIVE PERCENT: 2 %
LYMPHOCYTES RELATIVE PERCENT: 2.4 %
LYMPHOCYTES RELATIVE PERCENT: 2.6 %
LYMPHOCYTES RELATIVE PERCENT: 3 %
LYMPHOCYTES RELATIVE PERCENT: 3.1 %
LYMPHOCYTES RELATIVE PERCENT: 4.5 %
LYMPHOCYTES RELATIVE PERCENT: 6 %
MAGNESIUM: 2 MG/DL (ref 1.7–2.4)
MCH RBC QN AUTO: 28.3 PG (ref 27–31.3)
MCH RBC QN AUTO: 29.5 PG (ref 27–31.3)
MCH RBC QN AUTO: 29.7 PG (ref 27–31.3)
MCH RBC QN AUTO: 29.8 PG (ref 27–31.3)
MCH RBC QN AUTO: 30 PG (ref 27–31.3)
MCH RBC QN AUTO: 30.1 PG (ref 27–31.3)
MCH RBC QN AUTO: 30.1 PG (ref 27–31.3)
MCH RBC QN AUTO: 30.3 PG (ref 27–31.3)
MCH RBC QN AUTO: 32.9 PG (ref 27–31.3)
MCH RBC QN AUTO: 33 PG (ref 27–31.3)
MCH RBC QN AUTO: 33.3 PG (ref 27–31.3)
MCHC RBC AUTO-ENTMCNC: 32.5 % (ref 33–37)
MCHC RBC AUTO-ENTMCNC: 32.7 % (ref 33–37)
MCHC RBC AUTO-ENTMCNC: 33 % (ref 33–37)
MCHC RBC AUTO-ENTMCNC: 33.1 % (ref 33–37)
MCHC RBC AUTO-ENTMCNC: 33.5 % (ref 33–37)
MCHC RBC AUTO-ENTMCNC: 33.8 % (ref 33–37)
MCHC RBC AUTO-ENTMCNC: 33.9 % (ref 33–37)
MCHC RBC AUTO-ENTMCNC: 34 % (ref 33–37)
MCHC RBC AUTO-ENTMCNC: 34.3 % (ref 33–37)
MCV RBC AUTO: 86.7 FL (ref 80–100)
MCV RBC AUTO: 89 FL (ref 80–100)
MCV RBC AUTO: 89.4 FL (ref 80–100)
MCV RBC AUTO: 90.5 FL (ref 80–100)
MCV RBC AUTO: 90.7 FL (ref 80–100)
MCV RBC AUTO: 91.6 FL (ref 80–100)
MCV RBC AUTO: 96.8 FL (ref 80–100)
MCV RBC AUTO: 97.2 FL (ref 80–100)
MCV RBC AUTO: 98.7 FL (ref 80–100)
METAMYELOCYTES RELATIVE PERCENT: 2 %
MONOCYTES ABSOLUTE: 0.5 K/UL (ref 0.2–0.8)
MONOCYTES ABSOLUTE: 0.6 K/UL (ref 0.2–0.8)
MONOCYTES ABSOLUTE: 0.7 K/UL (ref 0.2–0.8)
MONOCYTES ABSOLUTE: 0.9 K/UL (ref 0.2–0.8)
MONOCYTES ABSOLUTE: 1 K/UL (ref 0.2–0.8)
MONOCYTES RELATIVE PERCENT: 5.1 %
MONOCYTES RELATIVE PERCENT: 5.4 %
MONOCYTES RELATIVE PERCENT: 5.6 %
MONOCYTES RELATIVE PERCENT: 5.8 %
MONOCYTES RELATIVE PERCENT: 6.2 %
MONOCYTES RELATIVE PERCENT: 6.7 %
MONOCYTES RELATIVE PERCENT: 8.9 %
MYELOCYTE PERCENT: 1 %
NEUTROPHILS ABSOLUTE: 10.1 K/UL (ref 1.4–6.5)
NEUTROPHILS ABSOLUTE: 10.4 K/UL (ref 1.4–6.5)
NEUTROPHILS ABSOLUTE: 11.5 K/UL (ref 1.4–6.5)
NEUTROPHILS ABSOLUTE: 12 K/UL (ref 1.4–6.5)
NEUTROPHILS ABSOLUTE: 15.4 K/UL (ref 1.4–6.5)
NEUTROPHILS ABSOLUTE: 5.4 K/UL (ref 1.4–6.5)
NEUTROPHILS ABSOLUTE: 9 K/UL (ref 1.4–6.5)
NEUTROPHILS RELATIVE PERCENT: 81.7 %
NEUTROPHILS RELATIVE PERCENT: 84 %
NEUTROPHILS RELATIVE PERCENT: 86.5 %
NEUTROPHILS RELATIVE PERCENT: 88.4 %
NEUTROPHILS RELATIVE PERCENT: 89.9 %
NEUTROPHILS RELATIVE PERCENT: 91 %
NEUTROPHILS RELATIVE PERCENT: 91.8 %
NITRITE, URINE: NEGATIVE
NITRITE, URINE: NEGATIVE
ORGANISM: ABNORMAL
PDW BLD-RTO: 13.5 % (ref 11.5–14.5)
PDW BLD-RTO: 14 % (ref 11.5–14.5)
PDW BLD-RTO: 14.3 % (ref 11.5–14.5)
PDW BLD-RTO: 17.3 % (ref 11.5–14.5)
PDW BLD-RTO: 17.5 % (ref 11.5–14.5)
PDW BLD-RTO: 17.6 % (ref 11.5–14.5)
PDW BLD-RTO: 17.6 % (ref 11.5–14.5)
PDW BLD-RTO: 17.7 % (ref 11.5–14.5)
PDW BLD-RTO: 17.8 % (ref 11.5–14.5)
PDW BLD-RTO: 17.9 % (ref 11.5–14.5)
PDW BLD-RTO: 19.5 % (ref 11.5–14.5)
PERFORMED ON: ABNORMAL
PERFORMED ON: NORMAL
PH UA: 5 (ref 5–9)
PH UA: 5 (ref 5–9)
PLATELET # BLD: 135 K/UL (ref 130–400)
PLATELET # BLD: 146 K/UL (ref 130–400)
PLATELET # BLD: 151 K/UL (ref 130–400)
PLATELET # BLD: 159 K/UL (ref 130–400)
PLATELET # BLD: 160 K/UL (ref 130–400)
PLATELET # BLD: 167 K/UL (ref 130–400)
PLATELET # BLD: 187 K/UL (ref 130–400)
PLATELET # BLD: 213 K/UL (ref 130–400)
PLATELET # BLD: 215 K/UL (ref 130–400)
PLATELET # BLD: 256 K/UL (ref 130–400)
PLATELET # BLD: 276 K/UL (ref 130–400)
PLATELET SLIDE REVIEW: ADEQUATE
PLATELET SLIDE REVIEW: ADEQUATE
POLYCHROMASIA: ABNORMAL
POTASSIUM REFLEX MAGNESIUM: 4.2 MEQ/L (ref 3.4–4.9)
POTASSIUM SERPL-SCNC: 4 MEQ/L (ref 3.4–4.9)
POTASSIUM SERPL-SCNC: 4.1 MEQ/L (ref 3.4–4.9)
POTASSIUM SERPL-SCNC: 4.2 MEQ/L (ref 3.4–4.9)
POTASSIUM SERPL-SCNC: 4.3 MEQ/L (ref 3.4–4.9)
POTASSIUM SERPL-SCNC: 4.5 MEQ/L (ref 3.4–4.9)
POTASSIUM SERPL-SCNC: 4.6 MEQ/L (ref 3.4–4.9)
POTASSIUM SERPL-SCNC: 4.8 MEQ/L (ref 3.4–4.9)
POTASSIUM SERPL-SCNC: 4.8 MEQ/L (ref 3.4–4.9)
POTASSIUM SERPL-SCNC: 4.8 MEQ/L (ref 3.5–5.1)
POTASSIUM SERPL-SCNC: 4.9 MEQ/L (ref 3.5–5.1)
POTASSIUM SERPL-SCNC: 5 MEQ/L (ref 3.4–4.9)
POTASSIUM SERPL-SCNC: 5 MEQ/L (ref 3.4–4.9)
PRO-BNP: 1065 PG/ML
PROMYELOCYTES PERCENT: 2 %
PROSTATE SPECIFIC ANTIGEN: 0.17 NG/ML (ref 0–6.22)
PROTEIN UA: 30 MG/DL
PROTEIN UA: NEGATIVE MG/DL
PROTHROMBIN TIME: 12.7 SEC (ref 9–11.5)
RBC # BLD: 3.11 M/UL (ref 4.7–6.1)
RBC # BLD: 3.16 M/UL (ref 4.7–6.1)
RBC # BLD: 3.24 M/UL (ref 4.7–6.1)
RBC # BLD: 3.37 M/UL (ref 4.7–6.1)
RBC # BLD: 3.4 M/UL (ref 4.7–6.1)
RBC # BLD: 3.52 M/UL (ref 4.7–6.1)
RBC # BLD: 3.56 M/UL (ref 4.7–6.1)
RBC # BLD: 3.98 M/UL (ref 4.7–6.1)
RBC # BLD: 4.04 M/UL (ref 4.7–6.1)
RBC # BLD: 4.08 M/UL (ref 4.7–6.1)
RBC # BLD: 4.41 M/UL (ref 4.7–6.1)
RBC UA: ABNORMAL /HPF (ref 0–2)
RBC UA: ABNORMAL /HPF (ref 0–2)
SLIDE REVIEW: ABNORMAL
SLIDE REVIEW: ABNORMAL
SODIUM BLD-SCNC: 131 MEQ/L (ref 135–144)
SODIUM BLD-SCNC: 131 MEQ/L (ref 135–144)
SODIUM BLD-SCNC: 132 MEQ/L (ref 135–144)
SODIUM BLD-SCNC: 133 MEQ/L (ref 135–144)
SODIUM BLD-SCNC: 136 MEQ/L (ref 135–144)
SODIUM BLD-SCNC: 137 MEQ/L (ref 132–144)
SODIUM BLD-SCNC: 137 MEQ/L (ref 135–144)
SODIUM BLD-SCNC: 138 MEQ/L (ref 135–144)
SODIUM BLD-SCNC: 139 MEQ/L (ref 135–144)
SODIUM BLD-SCNC: 142 MEQ/L (ref 132–144)
SODIUM BLD-SCNC: 142 MEQ/L (ref 135–144)
SODIUM URINE: <20 MEQ/L
SPECIFIC GRAVITY UA: 1.02 (ref 1–1.03)
SPECIFIC GRAVITY UA: 1.02 (ref 1–1.03)
TOTAL CK: 40 U/L (ref 0–190)
TOTAL PROTEIN: 6.9 G/DL (ref 6.3–8)
TOTAL PROTEIN: 7.4 G/DL (ref 6.3–8)
TROPONIN: 0.02 NG/ML (ref 0–0.01)
URINE CULTURE, ROUTINE: ABNORMAL
URINE CULTURE, ROUTINE: NORMAL
URINE REFLEX TO CULTURE: YES
URINE REFLEX TO CULTURE: YES
UROBILINOGEN, URINE: 0.2 E.U./DL
UROBILINOGEN, URINE: 0.2 E.U./DL
VACUOLATED NEUTROPHILS: ABNORMAL
WBC # BLD: 10.4 K/UL (ref 4.8–10.8)
WBC # BLD: 11.4 K/UL (ref 4.8–10.8)
WBC # BLD: 11.6 K/UL (ref 4.8–10.8)
WBC # BLD: 13 K/UL (ref 4.8–10.8)
WBC # BLD: 13.1 K/UL (ref 4.8–10.8)
WBC # BLD: 14 K/UL (ref 4.8–10.8)
WBC # BLD: 16.7 K/UL (ref 4.8–10.8)
WBC # BLD: 6.3 K/UL (ref 4.8–10.8)
WBC # BLD: 6.6 K/UL (ref 4.8–10.8)
WBC # BLD: 6.7 K/UL (ref 4.8–10.8)
WBC # BLD: 8.1 K/UL (ref 4.8–10.8)
WBC UA: ABNORMAL /HPF (ref 0–5)
WBC UA: ABNORMAL /HPF (ref 0–5)

## 2019-01-01 PROCEDURE — G8598 ASA/ANTIPLAT THER USED: HCPCS | Performed by: INTERNAL MEDICINE

## 2019-01-01 PROCEDURE — 4040F PNEUMOC VAC/ADMIN/RCVD: CPT | Performed by: UROLOGY

## 2019-01-01 PROCEDURE — 71045 X-RAY EXAM CHEST 1 VIEW: CPT

## 2019-01-01 PROCEDURE — 6360000002 HC RX W HCPCS: Performed by: INTERNAL MEDICINE

## 2019-01-01 PROCEDURE — 82962 GLUCOSE BLOOD TEST: CPT | Performed by: INTERNAL MEDICINE

## 2019-01-01 PROCEDURE — 99214 OFFICE O/P EST MOD 30 MIN: CPT | Performed by: INTERNAL MEDICINE

## 2019-01-01 PROCEDURE — 99215 OFFICE O/P EST HI 40 MIN: CPT | Performed by: UROLOGY

## 2019-01-01 PROCEDURE — 1123F ACP DISCUSS/DSCN MKR DOCD: CPT | Performed by: INTERNAL MEDICINE

## 2019-01-01 PROCEDURE — 99213 OFFICE O/P EST LOW 20 MIN: CPT | Performed by: UROLOGY

## 2019-01-01 PROCEDURE — 2580000003 HC RX 258: Performed by: INTERNAL MEDICINE

## 2019-01-01 PROCEDURE — 4040F PNEUMOC VAC/ADMIN/RCVD: CPT | Performed by: INTERNAL MEDICINE

## 2019-01-01 PROCEDURE — 84300 ASSAY OF URINE SODIUM: CPT

## 2019-01-01 PROCEDURE — 2580000003 HC RX 258: Performed by: NURSE PRACTITIONER

## 2019-01-01 PROCEDURE — 1111F DSCHRG MED/CURRENT MED MERGE: CPT | Performed by: INTERNAL MEDICINE

## 2019-01-01 PROCEDURE — 87040 BLOOD CULTURE FOR BACTERIA: CPT

## 2019-01-01 PROCEDURE — 92610 EVALUATE SWALLOWING FUNCTION: CPT

## 2019-01-01 PROCEDURE — 93005 ELECTROCARDIOGRAM TRACING: CPT | Performed by: NURSE PRACTITIONER

## 2019-01-01 PROCEDURE — 96365 THER/PROPH/DIAG IV INF INIT: CPT

## 2019-01-01 PROCEDURE — 36415 COLL VENOUS BLD VENIPUNCTURE: CPT

## 2019-01-01 PROCEDURE — 6370000000 HC RX 637 (ALT 250 FOR IP): Performed by: EMERGENCY MEDICINE

## 2019-01-01 PROCEDURE — 1036F TOBACCO NON-USER: CPT | Performed by: INTERNAL MEDICINE

## 2019-01-01 PROCEDURE — 3700000001 HC ADD 15 MINUTES (ANESTHESIA): Performed by: UROLOGY

## 2019-01-01 PROCEDURE — 80048 BASIC METABOLIC PNL TOTAL CA: CPT

## 2019-01-01 PROCEDURE — 97162 PT EVAL MOD COMPLEX 30 MIN: CPT

## 2019-01-01 PROCEDURE — 93005 ELECTROCARDIOGRAM TRACING: CPT | Performed by: INTERNAL MEDICINE

## 2019-01-01 PROCEDURE — 1101F PT FALLS ASSESS-DOCD LE1/YR: CPT | Performed by: INTERNAL MEDICINE

## 2019-01-01 PROCEDURE — 82570 ASSAY OF URINE CREATININE: CPT

## 2019-01-01 PROCEDURE — B518ZZA FLUOROSCOPY OF SUPERIOR VENA CAVA, GUIDANCE: ICD-10-PCS | Performed by: RADIOLOGY

## 2019-01-01 PROCEDURE — 85025 COMPLETE CBC W/AUTO DIFF WBC: CPT

## 2019-01-01 PROCEDURE — 99232 SBSQ HOSP IP/OBS MODERATE 35: CPT | Performed by: INTERNAL MEDICINE

## 2019-01-01 PROCEDURE — 3209999900 FLUORO FOR SURGICAL PROCEDURES

## 2019-01-01 PROCEDURE — 1036F TOBACCO NON-USER: CPT | Performed by: UROLOGY

## 2019-01-01 PROCEDURE — G8417 CALC BMI ABV UP PARAM F/U: HCPCS | Performed by: INTERNAL MEDICINE

## 2019-01-01 PROCEDURE — 2500000003 HC RX 250 WO HCPCS: Performed by: NURSE ANESTHETIST, CERTIFIED REGISTERED

## 2019-01-01 PROCEDURE — 99213 OFFICE O/P EST LOW 20 MIN: CPT | Performed by: INTERNAL MEDICINE

## 2019-01-01 PROCEDURE — G8417 CALC BMI ABV UP PARAM F/U: HCPCS | Performed by: UROLOGY

## 2019-01-01 PROCEDURE — 76775 US EXAM ABDO BACK WALL LIM: CPT

## 2019-01-01 PROCEDURE — 83880 ASSAY OF NATRIURETIC PEPTIDE: CPT

## 2019-01-01 PROCEDURE — 6370000000 HC RX 637 (ALT 250 FOR IP): Performed by: INTERNAL MEDICINE

## 2019-01-01 PROCEDURE — 87086 URINE CULTURE/COLONY COUNT: CPT

## 2019-01-01 PROCEDURE — 36573 INSJ PICC RS&I 5 YR+: CPT | Performed by: RADIOLOGY

## 2019-01-01 PROCEDURE — 7100000000 HC PACU RECOVERY - FIRST 15 MIN: Performed by: UROLOGY

## 2019-01-01 PROCEDURE — 6360000002 HC RX W HCPCS: Performed by: NURSE ANESTHETIST, CERTIFIED REGISTERED

## 2019-01-01 PROCEDURE — 92611 MOTION FLUOROSCOPY/SWALLOW: CPT

## 2019-01-01 PROCEDURE — 6360000002 HC RX W HCPCS: Performed by: NURSE PRACTITIONER

## 2019-01-01 PROCEDURE — 3600000014 HC SURGERY LEVEL 4 ADDTL 15MIN: Performed by: UROLOGY

## 2019-01-01 PROCEDURE — 51702 INSERT TEMP BLADDER CATH: CPT

## 2019-01-01 PROCEDURE — 51703 INSERT BLADDER CATH COMPLEX: CPT | Performed by: UROLOGY

## 2019-01-01 PROCEDURE — G8427 DOCREV CUR MEDS BY ELIG CLIN: HCPCS | Performed by: INTERNAL MEDICINE

## 2019-01-01 PROCEDURE — G8484 FLU IMMUNIZE NO ADMIN: HCPCS | Performed by: INTERNAL MEDICINE

## 2019-01-01 PROCEDURE — 99214 OFFICE O/P EST MOD 30 MIN: CPT | Performed by: UROLOGY

## 2019-01-01 PROCEDURE — 7100000011 HC PHASE II RECOVERY - ADDTL 15 MIN: Performed by: UROLOGY

## 2019-01-01 PROCEDURE — 2709999900 HC NON-CHARGEABLE SUPPLY: Performed by: UROLOGY

## 2019-01-01 PROCEDURE — 87077 CULTURE AEROBIC IDENTIFY: CPT

## 2019-01-01 PROCEDURE — 2709999900 IR PICC WO SQ PORT/PUMP > 5 YEARS

## 2019-01-01 PROCEDURE — 99231 SBSQ HOSP IP/OBS SF/LOW 25: CPT | Performed by: UROLOGY

## 2019-01-01 PROCEDURE — 1123F ACP DISCUSS/DSCN MKR DOCD: CPT | Performed by: UROLOGY

## 2019-01-01 PROCEDURE — 02HV33Z INSERTION OF INFUSION DEVICE INTO SUPERIOR VENA CAVA, PERCUTANEOUS APPROACH: ICD-10-PCS | Performed by: RADIOLOGY

## 2019-01-01 PROCEDURE — G8427 DOCREV CUR MEDS BY ELIG CLIN: HCPCS | Performed by: UROLOGY

## 2019-01-01 PROCEDURE — 0T778DZ DILATION OF LEFT URETER WITH INTRALUMINAL DEVICE, VIA NATURAL OR ARTIFICIAL OPENING ENDOSCOPIC: ICD-10-PCS | Performed by: UROLOGY

## 2019-01-01 PROCEDURE — 99285 EMERGENCY DEPT VISIT HI MDM: CPT

## 2019-01-01 PROCEDURE — 1210000000 HC MED SURG R&B

## 2019-01-01 PROCEDURE — 2700000000 HC OXYGEN THERAPY PER DAY

## 2019-01-01 PROCEDURE — 1101F PT FALLS ASSESS-DOCD LE1/YR: CPT | Performed by: UROLOGY

## 2019-01-01 PROCEDURE — 84484 ASSAY OF TROPONIN QUANT: CPT

## 2019-01-01 PROCEDURE — G8598 ASA/ANTIPLAT THER USED: HCPCS | Performed by: UROLOGY

## 2019-01-01 PROCEDURE — 2580000003 HC RX 258: Performed by: UROLOGY

## 2019-01-01 PROCEDURE — 2500000003 HC RX 250 WO HCPCS: Performed by: INTERNAL MEDICINE

## 2019-01-01 PROCEDURE — 82550 ASSAY OF CK (CPK): CPT

## 2019-01-01 PROCEDURE — 85027 COMPLETE CBC AUTOMATED: CPT

## 2019-01-01 PROCEDURE — 6360000004 HC RX CONTRAST MEDICATION: Performed by: UROLOGY

## 2019-01-01 PROCEDURE — 80053 COMPREHEN METABOLIC PANEL: CPT

## 2019-01-01 PROCEDURE — 74176 CT ABD & PELVIS W/O CONTRAST: CPT

## 2019-01-01 PROCEDURE — C1894 INTRO/SHEATH, NON-LASER: HCPCS | Performed by: UROLOGY

## 2019-01-01 PROCEDURE — 74230 X-RAY XM SWLNG FUNCJ C+: CPT

## 2019-01-01 PROCEDURE — C2625 STENT, NON-COR, TEM W/DEL SY: HCPCS | Performed by: UROLOGY

## 2019-01-01 PROCEDURE — 6370000000 HC RX 637 (ALT 250 FOR IP): Performed by: UROLOGY

## 2019-01-01 PROCEDURE — BT1F1ZZ FLUOROSCOPY OF LEFT KIDNEY, URETER AND BLADDER USING LOW OSMOLAR CONTRAST: ICD-10-PCS | Performed by: UROLOGY

## 2019-01-01 PROCEDURE — 83605 ASSAY OF LACTIC ACID: CPT

## 2019-01-01 PROCEDURE — C1758 CATHETER, URETERAL: HCPCS | Performed by: UROLOGY

## 2019-01-01 PROCEDURE — 99215 OFFICE O/P EST HI 40 MIN: CPT | Performed by: INTERNAL MEDICINE

## 2019-01-01 PROCEDURE — 6360000002 HC RX W HCPCS: Performed by: UROLOGY

## 2019-01-01 PROCEDURE — 7100000010 HC PHASE II RECOVERY - FIRST 15 MIN: Performed by: UROLOGY

## 2019-01-01 PROCEDURE — 93005 ELECTROCARDIOGRAM TRACING: CPT

## 2019-01-01 PROCEDURE — 99214 OFFICE O/P EST MOD 30 MIN: CPT | Performed by: NURSE PRACTITIONER

## 2019-01-01 PROCEDURE — 1111F DSCHRG MED/CURRENT MED MERGE: CPT | Performed by: UROLOGY

## 2019-01-01 PROCEDURE — 3600000004 HC SURGERY LEVEL 4 BASE: Performed by: UROLOGY

## 2019-01-01 PROCEDURE — 97166 OT EVAL MOD COMPLEX 45 MIN: CPT

## 2019-01-01 PROCEDURE — 81001 URINALYSIS AUTO W/SCOPE: CPT

## 2019-01-01 PROCEDURE — 93010 ELECTROCARDIOGRAM REPORT: CPT | Performed by: INTERNAL MEDICINE

## 2019-01-01 PROCEDURE — 3700000000 HC ANESTHESIA ATTENDED CARE: Performed by: UROLOGY

## 2019-01-01 PROCEDURE — 87186 SC STD MICRODIL/AGAR DIL: CPT

## 2019-01-01 PROCEDURE — 93296 REM INTERROG EVL PM/IDS: CPT

## 2019-01-01 PROCEDURE — 7100000001 HC PACU RECOVERY - ADDTL 15 MIN: Performed by: UROLOGY

## 2019-01-01 PROCEDURE — 83036 HEMOGLOBIN GLYCOSYLATED A1C: CPT | Performed by: INTERNAL MEDICINE

## 2019-01-01 PROCEDURE — P9047 ALBUMIN (HUMAN), 25%, 50ML: HCPCS | Performed by: INTERNAL MEDICINE

## 2019-01-01 PROCEDURE — 52332 CYSTOSCOPY AND TREATMENT: CPT | Performed by: UROLOGY

## 2019-01-01 PROCEDURE — 52310 CYSTOSCOPY AND TREATMENT: CPT | Performed by: UROLOGY

## 2019-01-01 PROCEDURE — 74018 RADEX ABDOMEN 1 VIEW: CPT

## 2019-01-01 PROCEDURE — 99223 1ST HOSP IP/OBS HIGH 75: CPT | Performed by: INTERNAL MEDICINE

## 2019-01-01 PROCEDURE — 51798 US URINE CAPACITY MEASURE: CPT

## 2019-01-01 PROCEDURE — 2580000003 HC RX 258

## 2019-01-01 PROCEDURE — 83735 ASSAY OF MAGNESIUM: CPT

## 2019-01-01 PROCEDURE — 97535 SELF CARE MNGMENT TRAINING: CPT

## 2019-01-01 PROCEDURE — G8484 FLU IMMUNIZE NO ADMIN: HCPCS | Performed by: UROLOGY

## 2019-01-01 PROCEDURE — 70450 CT HEAD/BRAIN W/O DYE: CPT

## 2019-01-01 PROCEDURE — 85610 PROTHROMBIN TIME: CPT

## 2019-01-01 DEVICE — STENT URET 6FR L28CM POLYMER BLEND PH FREE COAT GRADUAL TAPR: Type: IMPLANTABLE DEVICE | Status: FUNCTIONAL

## 2019-01-01 RX ORDER — PANTOPRAZOLE SODIUM 40 MG/1
40 TABLET, DELAYED RELEASE ORAL DAILY
Status: ON HOLD | COMMUNITY
End: 2019-01-01 | Stop reason: HOSPADM

## 2019-01-01 RX ORDER — TAMSULOSIN HYDROCHLORIDE 0.4 MG/1
0.4 CAPSULE ORAL DAILY
COMMUNITY

## 2019-01-01 RX ORDER — ALOGLIPTIN 25 MG/1
25 TABLET, FILM COATED ORAL DAILY
COMMUNITY
End: 2019-01-01

## 2019-01-01 RX ORDER — SODIUM CHLORIDE 0.9 % (FLUSH) 0.9 %
10 SYRINGE (ML) INJECTION EVERY 12 HOURS SCHEDULED
Status: CANCELLED | OUTPATIENT
Start: 2019-01-01

## 2019-01-01 RX ORDER — PROPOFOL 10 MG/ML
INJECTION, EMULSION INTRAVENOUS PRN
Status: DISCONTINUED | OUTPATIENT
Start: 2019-01-01 | End: 2019-01-01 | Stop reason: SDUPTHER

## 2019-01-01 RX ORDER — LANOLIN ALCOHOL/MO/W.PET/CERES
3 CREAM (GRAM) TOPICAL NIGHTLY PRN
COMMUNITY

## 2019-01-01 RX ORDER — HEPARIN SODIUM 5000 [USP'U]/ML
5000 INJECTION, SOLUTION INTRAVENOUS; SUBCUTANEOUS EVERY 8 HOURS SCHEDULED
Status: DISCONTINUED | OUTPATIENT
Start: 2019-01-01 | End: 2019-01-01 | Stop reason: HOSPADM

## 2019-01-01 RX ORDER — AMANTADINE HYDROCHLORIDE 100 MG/1
100 CAPSULE, GELATIN COATED ORAL DAILY
Status: DISCONTINUED | OUTPATIENT
Start: 2019-01-01 | End: 2019-01-01 | Stop reason: HOSPADM

## 2019-01-01 RX ORDER — GLUCOSAMINE HCL/CHONDROITIN SU 500-400 MG
CAPSULE ORAL 4 TIMES DAILY
Qty: 400 STRIP | Refills: 2 | Status: SHIPPED | OUTPATIENT
Start: 2019-01-01 | End: 2019-01-01 | Stop reason: ALTCHOICE

## 2019-01-01 RX ORDER — FINASTERIDE 5 MG/1
5 TABLET, FILM COATED ORAL DAILY
COMMUNITY

## 2019-01-01 RX ORDER — CHLORHEXIDINE GLUCONATE 4 G/100ML
SOLUTION TOPICAL PRN
Status: DISCONTINUED | OUTPATIENT
Start: 2019-01-01 | End: 2019-01-01 | Stop reason: HOSPADM

## 2019-01-01 RX ORDER — LIDOCAINE HYDROCHLORIDE 20 MG/ML
INJECTION, SOLUTION INFILTRATION; PERINEURAL PRN
Status: DISCONTINUED | OUTPATIENT
Start: 2019-01-01 | End: 2019-01-01 | Stop reason: SDUPTHER

## 2019-01-01 RX ORDER — AMANTADINE HYDROCHLORIDE 100 MG/1
100 CAPSULE, GELATIN COATED ORAL DAILY
COMMUNITY
End: 2019-01-01 | Stop reason: ALTCHOICE

## 2019-01-01 RX ORDER — SODIUM CHLORIDE 0.9 % (FLUSH) 0.9 %
10 SYRINGE (ML) INJECTION EVERY 12 HOURS SCHEDULED
Status: DISCONTINUED | OUTPATIENT
Start: 2019-01-01 | End: 2019-01-01 | Stop reason: HOSPADM

## 2019-01-01 RX ORDER — ONDANSETRON 4 MG/1
4 TABLET, FILM COATED ORAL EVERY 8 HOURS PRN
COMMUNITY
End: 2019-01-01 | Stop reason: ALTCHOICE

## 2019-01-01 RX ORDER — MECLIZINE HYDROCHLORIDE 25 MG/1
25 TABLET ORAL ONCE
Status: COMPLETED | OUTPATIENT
Start: 2019-01-01 | End: 2019-01-01

## 2019-01-01 RX ORDER — GLIPIZIDE 5 MG/1
5 TABLET ORAL DAILY
COMMUNITY

## 2019-01-01 RX ORDER — ONDANSETRON 2 MG/ML
4 INJECTION INTRAMUSCULAR; INTRAVENOUS EVERY 6 HOURS PRN
Status: CANCELLED | OUTPATIENT
Start: 2019-01-01

## 2019-01-01 RX ORDER — CEFTRIAXONE 1 G/1
1 INJECTION, POWDER, FOR SOLUTION INTRAMUSCULAR; INTRAVENOUS EVERY 12 HOURS
COMMUNITY
End: 2019-01-01 | Stop reason: ALTCHOICE

## 2019-01-01 RX ORDER — SENNA AND DOCUSATE SODIUM 50; 8.6 MG/1; MG/1
1 TABLET, FILM COATED ORAL DAILY
Status: DISCONTINUED | OUTPATIENT
Start: 2019-01-01 | End: 2019-01-01 | Stop reason: HOSPADM

## 2019-01-01 RX ORDER — L. ACIDOPHILUS/L.BULGARICUS 1MM CELL
4 TABLET ORAL 3 TIMES DAILY
DISCHARGE
Start: 2019-01-01 | End: 2019-01-01 | Stop reason: ALTCHOICE

## 2019-01-01 RX ORDER — MAGNESIUM HYDROXIDE 1200 MG/15ML
LIQUID ORAL PRN
Status: DISCONTINUED | OUTPATIENT
Start: 2019-01-01 | End: 2019-01-01 | Stop reason: HOSPADM

## 2019-01-01 RX ORDER — SODIUM CHLORIDE 0.9 % (FLUSH) 0.9 %
10 SYRINGE (ML) INJECTION PRN
Status: DISCONTINUED | OUTPATIENT
Start: 2019-01-01 | End: 2019-01-01 | Stop reason: HOSPADM

## 2019-01-01 RX ORDER — SODIUM CHLORIDE, SODIUM LACTATE, POTASSIUM CHLORIDE, CALCIUM CHLORIDE 600; 310; 30; 20 MG/100ML; MG/100ML; MG/100ML; MG/100ML
INJECTION, SOLUTION INTRAVENOUS CONTINUOUS
Status: DISCONTINUED | OUTPATIENT
Start: 2019-01-01 | End: 2019-01-01 | Stop reason: HOSPADM

## 2019-01-01 RX ORDER — CIPROFLOXACIN 2 MG/ML
400 INJECTION, SOLUTION INTRAVENOUS EVERY 24 HOURS
Status: DISCONTINUED | OUTPATIENT
Start: 2019-01-01 | End: 2019-01-01

## 2019-01-01 RX ORDER — ETOMIDATE 2 MG/ML
INJECTION INTRAVENOUS PRN
Status: DISCONTINUED | OUTPATIENT
Start: 2019-01-01 | End: 2019-01-01 | Stop reason: SDUPTHER

## 2019-01-01 RX ORDER — FAMOTIDINE 20 MG/1
20 TABLET, FILM COATED ORAL 2 TIMES DAILY PRN
DISCHARGE
Start: 2019-01-01

## 2019-01-01 RX ORDER — CHLORHEXIDINE GLUCONATE 4 G/100ML
SOLUTION TOPICAL PRN
Status: DISCONTINUED | OUTPATIENT
Start: 2019-01-01 | End: 2019-01-01 | Stop reason: ALTCHOICE

## 2019-01-01 RX ORDER — HYDROCODONE BITARTRATE AND ACETAMINOPHEN 5; 325 MG/1; MG/1
1 TABLET ORAL PRN
Status: DISCONTINUED | OUTPATIENT
Start: 2019-01-01 | End: 2019-01-01 | Stop reason: HOSPADM

## 2019-01-01 RX ORDER — MAGNESIUM HYDROXIDE/ALUMINUM HYDROXICE/SIMETHICONE 120; 1200; 1200 MG/30ML; MG/30ML; MG/30ML
5 SUSPENSION ORAL EVERY 6 HOURS PRN
COMMUNITY

## 2019-01-01 RX ORDER — SODIUM CHLORIDE 0.9 % (FLUSH) 0.9 %
10 SYRINGE (ML) INJECTION PRN
Status: DISCONTINUED | OUTPATIENT
Start: 2019-01-01 | End: 2019-01-01 | Stop reason: SDUPTHER

## 2019-01-01 RX ORDER — THIAMINE HYDROCHLORIDE 100 MG/ML
500 INJECTION, SOLUTION INTRAMUSCULAR; INTRAVENOUS ONCE
Status: DISCONTINUED | OUTPATIENT
Start: 2019-01-01 | End: 2019-01-01 | Stop reason: CLARIF

## 2019-01-01 RX ORDER — NICOTINE POLACRILEX 4 MG
15 LOZENGE BUCCAL PRN
Status: DISCONTINUED | OUTPATIENT
Start: 2019-01-01 | End: 2019-01-01 | Stop reason: HOSPADM

## 2019-01-01 RX ORDER — SODIUM CHLORIDE 0.9 % (FLUSH) 0.9 %
10 SYRINGE (ML) INJECTION EVERY 12 HOURS SCHEDULED
Status: DISCONTINUED | OUTPATIENT
Start: 2019-01-01 | End: 2019-01-01 | Stop reason: SDUPTHER

## 2019-01-01 RX ORDER — DEXTROSE MONOHYDRATE 50 MG/ML
100 INJECTION, SOLUTION INTRAVENOUS PRN
Status: DISCONTINUED | OUTPATIENT
Start: 2019-01-01 | End: 2019-01-01 | Stop reason: HOSPADM

## 2019-01-01 RX ORDER — SODIUM CHLORIDE 0.9 % (FLUSH) 0.9 %
3 SYRINGE (ML) INJECTION EVERY 8 HOURS
Status: DISCONTINUED | OUTPATIENT
Start: 2019-01-01 | End: 2019-01-01 | Stop reason: HOSPADM

## 2019-01-01 RX ORDER — 0.9 % SODIUM CHLORIDE 0.9 %
500 INTRAVENOUS SOLUTION INTRAVENOUS ONCE
Status: COMPLETED | OUTPATIENT
Start: 2019-01-01 | End: 2019-01-01

## 2019-01-01 RX ORDER — MAGNESIUM HYDROXIDE 1200 MG/15ML
LIQUID ORAL
Status: COMPLETED
Start: 2019-01-01 | End: 2019-01-01

## 2019-01-01 RX ORDER — 0.9 % SODIUM CHLORIDE 0.9 %
1000 INTRAVENOUS SOLUTION INTRAVENOUS ONCE
Status: COMPLETED | OUTPATIENT
Start: 2019-01-01 | End: 2019-01-01

## 2019-01-01 RX ORDER — CIPROFLOXACIN 2 MG/ML
400 INJECTION, SOLUTION INTRAVENOUS ONCE
Status: COMPLETED | OUTPATIENT
Start: 2019-01-01 | End: 2019-01-01

## 2019-01-01 RX ORDER — CHLORPROMAZINE HYDROCHLORIDE 10 MG/1
10 TABLET, FILM COATED ORAL 3 TIMES DAILY PRN
Status: DISCONTINUED | OUTPATIENT
Start: 2019-01-01 | End: 2019-01-01 | Stop reason: HOSPADM

## 2019-01-01 RX ORDER — PRAVASTATIN SODIUM 80 MG/1
80 TABLET ORAL DAILY
Status: DISCONTINUED | OUTPATIENT
Start: 2019-01-01 | End: 2019-01-01

## 2019-01-01 RX ORDER — ACETAMINOPHEN 325 MG/1
650 TABLET ORAL EVERY 4 HOURS PRN
Status: DISCONTINUED | OUTPATIENT
Start: 2019-01-01 | End: 2019-01-01 | Stop reason: HOSPADM

## 2019-01-01 RX ORDER — FENTANYL CITRATE 50 UG/ML
25 INJECTION, SOLUTION INTRAMUSCULAR; INTRAVENOUS EVERY 5 MIN PRN
Status: DISCONTINUED | OUTPATIENT
Start: 2019-01-01 | End: 2019-01-01 | Stop reason: HOSPADM

## 2019-01-01 RX ORDER — LIDOCAINE HYDROCHLORIDE 20 MG/ML
5 INJECTION, SOLUTION INFILTRATION; PERINEURAL ONCE
Status: COMPLETED | OUTPATIENT
Start: 2019-01-01 | End: 2019-01-01

## 2019-01-01 RX ORDER — SODIUM CHLORIDE 0.9 % (FLUSH) 0.9 %
10 SYRINGE (ML) INJECTION PRN
Status: CANCELLED | OUTPATIENT
Start: 2019-01-01

## 2019-01-01 RX ORDER — HEPARIN SODIUM 5000 [USP'U]/ML
5000 INJECTION, SOLUTION INTRAVENOUS; SUBCUTANEOUS EVERY 8 HOURS SCHEDULED
DISCHARGE
Start: 2019-01-01 | End: 2019-01-01

## 2019-01-01 RX ORDER — LINEZOLID 2 MG/ML
600 INJECTION, SOLUTION INTRAVENOUS
Status: COMPLETED | OUTPATIENT
Start: 2019-01-01 | End: 2019-01-01

## 2019-01-01 RX ORDER — MAGNESIUM HYDROXIDE 1200 MG/15ML
LIQUID ORAL PRN
Status: DISCONTINUED | OUTPATIENT
Start: 2019-01-01 | End: 2019-01-01 | Stop reason: ALTCHOICE

## 2019-01-01 RX ORDER — FUROSEMIDE 20 MG/1
10 TABLET ORAL DAILY
Qty: 30 TABLET | Refills: 3 | Status: SHIPPED | OUTPATIENT
Start: 2019-01-01 | End: 2019-01-01

## 2019-01-01 RX ORDER — ASPIRIN 325 MG
325 TABLET ORAL EVERY 4 HOURS PRN
Status: ON HOLD | COMMUNITY
End: 2019-01-01 | Stop reason: HOSPADM

## 2019-01-01 RX ORDER — ONDANSETRON 2 MG/ML
4 INJECTION INTRAMUSCULAR; INTRAVENOUS
Status: DISCONTINUED | OUTPATIENT
Start: 2019-01-01 | End: 2019-01-01 | Stop reason: HOSPADM

## 2019-01-01 RX ORDER — CARVEDILOL 12.5 MG/1
12.5 TABLET ORAL 2 TIMES DAILY WITH MEALS
COMMUNITY

## 2019-01-01 RX ORDER — CARVEDILOL 12.5 MG/1
12.5 TABLET ORAL 2 TIMES DAILY WITH MEALS
Status: DISCONTINUED | OUTPATIENT
Start: 2019-01-01 | End: 2019-01-01

## 2019-01-01 RX ORDER — L. ACIDOPHILUS/L.BULGARICUS 1MM CELL
4 TABLET ORAL 3 TIMES DAILY
Status: DISCONTINUED | OUTPATIENT
Start: 2019-01-01 | End: 2019-01-01 | Stop reason: HOSPADM

## 2019-01-01 RX ORDER — AMLODIPINE BESYLATE 10 MG/1
10 TABLET ORAL DAILY
COMMUNITY

## 2019-01-01 RX ORDER — ASPIRIN 325 MG
325 TABLET ORAL DAILY
COMMUNITY

## 2019-01-01 RX ORDER — FUROSEMIDE 20 MG/1
20 TABLET ORAL DAILY
Qty: 30 TABLET | Refills: 3 | Status: ON HOLD | OUTPATIENT
Start: 2019-01-01 | End: 2019-01-01 | Stop reason: HOSPADM

## 2019-01-01 RX ORDER — ALBUMIN (HUMAN) 12.5 G/50ML
25 SOLUTION INTRAVENOUS ONCE
Status: COMPLETED | OUTPATIENT
Start: 2019-01-01 | End: 2019-01-01

## 2019-01-01 RX ORDER — LIDOCAINE HYDROCHLORIDE 10 MG/ML
1 INJECTION, SOLUTION EPIDURAL; INFILTRATION; INTRACAUDAL; PERINEURAL
Status: DISCONTINUED | OUTPATIENT
Start: 2019-01-01 | End: 2019-01-01 | Stop reason: HOSPADM

## 2019-01-01 RX ORDER — PRAVASTATIN SODIUM 80 MG/1
80 TABLET ORAL NIGHTLY
Status: DISCONTINUED | OUTPATIENT
Start: 2019-01-01 | End: 2019-01-01 | Stop reason: HOSPADM

## 2019-01-01 RX ORDER — SENNA AND DOCUSATE SODIUM 50; 8.6 MG/1; MG/1
1 TABLET, FILM COATED ORAL DAILY
DISCHARGE
Start: 2019-01-01

## 2019-01-01 RX ORDER — ONDANSETRON 2 MG/ML
INJECTION INTRAMUSCULAR; INTRAVENOUS PRN
Status: DISCONTINUED | OUTPATIENT
Start: 2019-01-01 | End: 2019-01-01 | Stop reason: SDUPTHER

## 2019-01-01 RX ORDER — CARVEDILOL 12.5 MG/1
12.5 TABLET ORAL 2 TIMES DAILY WITH MEALS
COMMUNITY
End: 2019-01-01 | Stop reason: ALTCHOICE

## 2019-01-01 RX ORDER — ONDANSETRON 2 MG/ML
4 INJECTION INTRAMUSCULAR; INTRAVENOUS EVERY 6 HOURS PRN
Status: DISCONTINUED | OUTPATIENT
Start: 2019-01-01 | End: 2019-01-01 | Stop reason: HOSPADM

## 2019-01-01 RX ORDER — DEXTROSE MONOHYDRATE 25 G/50ML
12.5 INJECTION, SOLUTION INTRAVENOUS PRN
Status: DISCONTINUED | OUTPATIENT
Start: 2019-01-01 | End: 2019-01-01 | Stop reason: HOSPADM

## 2019-01-01 RX ORDER — PANTOPRAZOLE SODIUM 40 MG/1
40 TABLET, DELAYED RELEASE ORAL DAILY
Status: DISCONTINUED | OUTPATIENT
Start: 2019-01-01 | End: 2019-01-01 | Stop reason: HOSPADM

## 2019-01-01 RX ORDER — 0.9 % SODIUM CHLORIDE 0.9 %
250 INTRAVENOUS SOLUTION INTRAVENOUS ONCE
Status: COMPLETED | OUTPATIENT
Start: 2019-01-01 | End: 2019-01-01

## 2019-01-01 RX ORDER — FENTANYL CITRATE 50 UG/ML
50 INJECTION, SOLUTION INTRAMUSCULAR; INTRAVENOUS EVERY 10 MIN PRN
Status: DISCONTINUED | OUTPATIENT
Start: 2019-01-01 | End: 2019-01-01 | Stop reason: HOSPADM

## 2019-01-01 RX ORDER — MAGNESIUM SULFATE IN WATER 40 MG/ML
2 INJECTION, SOLUTION INTRAVENOUS ONCE
Status: COMPLETED | OUTPATIENT
Start: 2019-01-01 | End: 2019-01-01

## 2019-01-01 RX ORDER — BISACODYL 10 MG
10 SUPPOSITORY, RECTAL RECTAL DAILY
COMMUNITY

## 2019-01-01 RX ORDER — SODIUM CHLORIDE 9 MG/ML
1000 INJECTION, SOLUTION INTRAVENOUS CONTINUOUS
Status: DISCONTINUED | OUTPATIENT
Start: 2019-01-01 | End: 2019-01-01

## 2019-01-01 RX ORDER — POLYETHYLENE GLYCOL 3350 17 G/17G
17 POWDER, FOR SOLUTION ORAL DAILY
Status: DISCONTINUED | OUTPATIENT
Start: 2019-01-01 | End: 2019-01-01 | Stop reason: HOSPADM

## 2019-01-01 RX ORDER — DIPHENHYDRAMINE HYDROCHLORIDE 50 MG/ML
12.5 INJECTION INTRAMUSCULAR; INTRAVENOUS
Status: DISCONTINUED | OUTPATIENT
Start: 2019-01-01 | End: 2019-01-01 | Stop reason: HOSPADM

## 2019-01-01 RX ORDER — SODIUM CHLORIDE 9 MG/ML
250 INJECTION, SOLUTION INTRAVENOUS ONCE
Status: COMPLETED | OUTPATIENT
Start: 2019-01-01 | End: 2019-01-01

## 2019-01-01 RX ORDER — CHOLECALCIFEROL (VITAMIN D3) 10 MCG
1 TABLET ORAL DAILY
Qty: 30 CAPSULE | Refills: 3 | DISCHARGE
Start: 2019-01-01 | End: 2019-01-01 | Stop reason: ALTCHOICE

## 2019-01-01 RX ORDER — SODIUM CHLORIDE 9 MG/ML
INJECTION, SOLUTION INTRAVENOUS
Status: COMPLETED
Start: 2019-01-01 | End: 2019-01-01

## 2019-01-01 RX ORDER — CHLOROTHIAZIDE 250 MG/1
TABLET ORAL
Qty: 90 TABLET | Refills: 3 | Status: SHIPPED | OUTPATIENT
Start: 2019-01-01 | End: 2019-01-01

## 2019-01-01 RX ORDER — CHOLECALCIFEROL (VITAMIN D3) 10 MCG
1 TABLET ORAL DAILY
Status: DISCONTINUED | OUTPATIENT
Start: 2019-01-01 | End: 2019-01-01 | Stop reason: HOSPADM

## 2019-01-01 RX ORDER — OXYBUTYNIN CHLORIDE 5 MG/1
2.5 TABLET ORAL 2 TIMES DAILY
Status: ON HOLD | COMMUNITY
End: 2019-01-01

## 2019-01-01 RX ORDER — METOCLOPRAMIDE HYDROCHLORIDE 5 MG/ML
10 INJECTION INTRAMUSCULAR; INTRAVENOUS
Status: DISCONTINUED | OUTPATIENT
Start: 2019-01-01 | End: 2019-01-01 | Stop reason: HOSPADM

## 2019-01-01 RX ORDER — GLIPIZIDE 5 MG/1
2.5 TABLET ORAL DAILY
Status: ON HOLD | COMMUNITY
End: 2019-01-01 | Stop reason: HOSPADM

## 2019-01-01 RX ORDER — HYDROCODONE BITARTRATE AND ACETAMINOPHEN 5; 325 MG/1; MG/1
2 TABLET ORAL PRN
Status: DISCONTINUED | OUTPATIENT
Start: 2019-01-01 | End: 2019-01-01 | Stop reason: HOSPADM

## 2019-01-01 RX ORDER — OXCARBAZEPINE 300 MG/1
300 TABLET, FILM COATED ORAL DAILY
COMMUNITY

## 2019-01-01 RX ORDER — SODIUM CHLORIDE 9 MG/ML
INJECTION, SOLUTION INTRAVENOUS CONTINUOUS
Status: DISPENSED | OUTPATIENT
Start: 2019-01-01 | End: 2019-01-01

## 2019-01-01 RX ADMIN — CEFTRIAXONE SODIUM 1 G: 1 INJECTION, POWDER, FOR SOLUTION INTRAMUSCULAR; INTRAVENOUS at 21:35

## 2019-01-01 RX ADMIN — SODIUM CHLORIDE 1000 ML: 9 INJECTION, SOLUTION INTRAVENOUS at 18:45

## 2019-01-01 RX ADMIN — HEPARIN SODIUM 5000 UNITS: 5000 INJECTION INTRAVENOUS; SUBCUTANEOUS at 18:45

## 2019-01-01 RX ADMIN — LACTOBACILLUS TAB 4 TABLET: TAB at 21:10

## 2019-01-01 RX ADMIN — PANTOPRAZOLE SODIUM 40 MG: 40 TABLET, DELAYED RELEASE ORAL at 06:38

## 2019-01-01 RX ADMIN — LINEZOLID 600 MG: 600 INJECTION, SOLUTION INTRAVENOUS at 07:23

## 2019-01-01 RX ADMIN — Medication 10 ML: at 21:47

## 2019-01-01 RX ADMIN — SODIUM CHLORIDE 250 ML: 9 INJECTION, SOLUTION INTRAVENOUS at 18:57

## 2019-01-01 RX ADMIN — HEPARIN SODIUM 5000 UNITS: 5000 INJECTION INTRAVENOUS; SUBCUTANEOUS at 04:37

## 2019-01-01 RX ADMIN — SENNOSIDES AND DOCUSATE SODIUM 1 TABLET: 8.6; 5 TABLET ORAL at 08:29

## 2019-01-01 RX ADMIN — PANTOPRAZOLE SODIUM 40 MG: 40 TABLET, DELAYED RELEASE ORAL at 10:47

## 2019-01-01 RX ADMIN — AMANTADINE HYDROCHLORIDE 100 MG: 100 CAPSULE ORAL at 10:46

## 2019-01-01 RX ADMIN — SENNOSIDES AND DOCUSATE SODIUM 1 TABLET: 8.6; 5 TABLET ORAL at 10:27

## 2019-01-01 RX ADMIN — ALBUMIN (HUMAN) 25 G: 0.25 INJECTION, SOLUTION INTRAVENOUS at 21:12

## 2019-01-01 RX ADMIN — CEFTRIAXONE SODIUM 1 G: 1 INJECTION, POWDER, FOR SOLUTION INTRAMUSCULAR; INTRAVENOUS at 21:12

## 2019-01-01 RX ADMIN — HEPARIN SODIUM 5000 UNITS: 5000 INJECTION INTRAVENOUS; SUBCUTANEOUS at 10:47

## 2019-01-01 RX ADMIN — Medication 10 ML: at 21:23

## 2019-01-01 RX ADMIN — Medication 250 ML: at 18:57

## 2019-01-01 RX ADMIN — LACTOBACILLUS TAB 4 TABLET: TAB at 14:55

## 2019-01-01 RX ADMIN — SODIUM CHLORIDE 1000 ML: 9 INJECTION, SOLUTION INTRAVENOUS at 05:54

## 2019-01-01 RX ADMIN — LIDOCAINE HYDROCHLORIDE 5 ML: 20 INJECTION, SOLUTION INFILTRATION; PERINEURAL at 15:15

## 2019-01-01 RX ADMIN — POLYETHYLENE GLYCOL 3350 17 G: 17 POWDER, FOR SOLUTION ORAL at 08:29

## 2019-01-01 RX ADMIN — MECLIZINE HYDROCHLORIDE 25 MG: 25 TABLET ORAL at 04:37

## 2019-01-01 RX ADMIN — HEPARIN SODIUM 5000 UNITS: 5000 INJECTION INTRAVENOUS; SUBCUTANEOUS at 02:13

## 2019-01-01 RX ADMIN — LACTOBACILLUS TAB 4 TABLET: TAB at 08:37

## 2019-01-01 RX ADMIN — PRAVASTATIN SODIUM 80 MG: 80 TABLET ORAL at 10:47

## 2019-01-01 RX ADMIN — SODIUM CHLORIDE 1000 ML: 9 INJECTION, SOLUTION INTRAVENOUS at 02:37

## 2019-01-01 RX ADMIN — ETOMIDATE 15 MG: 2 INJECTION, SOLUTION INTRAVENOUS at 13:47

## 2019-01-01 RX ADMIN — Medication 10 ML: at 21:10

## 2019-01-01 RX ADMIN — SODIUM CHLORIDE 1000 ML: 9 INJECTION, SOLUTION INTRAVENOUS at 10:36

## 2019-01-01 RX ADMIN — NEPHROCAP 1 MG: 1 CAP ORAL at 08:38

## 2019-01-01 RX ADMIN — THIAMINE HYDROCHLORIDE: 100 INJECTION, SOLUTION INTRAMUSCULAR; INTRAVENOUS at 10:36

## 2019-01-01 RX ADMIN — CEFTRIAXONE SODIUM 1 G: 1 INJECTION, POWDER, FOR SOLUTION INTRAMUSCULAR; INTRAVENOUS at 13:25

## 2019-01-01 RX ADMIN — NEPHROCAP 1 MG: 1 CAP ORAL at 09:41

## 2019-01-01 RX ADMIN — SODIUM CHLORIDE 250 ML: 9 INJECTION, SOLUTION INTRAVENOUS at 15:15

## 2019-01-01 RX ADMIN — HEPARIN SODIUM 5000 UNITS: 5000 INJECTION INTRAVENOUS; SUBCUTANEOUS at 02:58

## 2019-01-01 RX ADMIN — LACTOBACILLUS TAB 4 TABLET: TAB at 21:35

## 2019-01-01 RX ADMIN — PANTOPRAZOLE SODIUM 40 MG: 40 TABLET, DELAYED RELEASE ORAL at 05:43

## 2019-01-01 RX ADMIN — HEPARIN SODIUM 5000 UNITS: 5000 INJECTION INTRAVENOUS; SUBCUTANEOUS at 08:38

## 2019-01-01 RX ADMIN — AMANTADINE HYDROCHLORIDE 100 MG: 100 CAPSULE ORAL at 08:29

## 2019-01-01 RX ADMIN — AMANTADINE HYDROCHLORIDE 100 MG: 100 CAPSULE ORAL at 10:27

## 2019-01-01 RX ADMIN — ONDANSETRON 4 MG: 2 INJECTION INTRAMUSCULAR; INTRAVENOUS at 13:52

## 2019-01-01 RX ADMIN — PRAVASTATIN SODIUM 80 MG: 80 TABLET ORAL at 21:46

## 2019-01-01 RX ADMIN — Medication 10 ML: at 22:00

## 2019-01-01 RX ADMIN — LACTOBACILLUS TAB 4 TABLET: TAB at 10:28

## 2019-01-01 RX ADMIN — LACTOBACILLUS TAB 4 TABLET: TAB at 12:43

## 2019-01-01 RX ADMIN — Medication 10 ML: at 21:12

## 2019-01-01 RX ADMIN — CEFTRIAXONE SODIUM 1 G: 1 INJECTION, POWDER, FOR SOLUTION INTRAMUSCULAR; INTRAVENOUS at 21:46

## 2019-01-01 RX ADMIN — HEPARIN SODIUM 5000 UNITS: 5000 INJECTION INTRAVENOUS; SUBCUTANEOUS at 17:25

## 2019-01-01 RX ADMIN — HEPARIN SODIUM 5000 UNITS: 5000 INJECTION INTRAVENOUS; SUBCUTANEOUS at 03:33

## 2019-01-01 RX ADMIN — CEFEPIME 1 G: 1 INJECTION, POWDER, FOR SOLUTION INTRAMUSCULAR; INTRAVENOUS at 22:37

## 2019-01-01 RX ADMIN — AMANTADINE HYDROCHLORIDE 100 MG: 100 CAPSULE ORAL at 09:41

## 2019-01-01 RX ADMIN — HEPARIN SODIUM 5000 UNITS: 5000 INJECTION INTRAVENOUS; SUBCUTANEOUS at 19:34

## 2019-01-01 RX ADMIN — SODIUM CHLORIDE, POTASSIUM CHLORIDE, SODIUM LACTATE AND CALCIUM CHLORIDE: 600; 310; 30; 20 INJECTION, SOLUTION INTRAVENOUS at 06:34

## 2019-01-01 RX ADMIN — PANTOPRAZOLE SODIUM 40 MG: 40 TABLET, DELAYED RELEASE ORAL at 05:48

## 2019-01-01 RX ADMIN — CIPROFLOXACIN 400 MG: 2 INJECTION, SOLUTION INTRAVENOUS at 04:19

## 2019-01-01 RX ADMIN — THIAMINE HYDROCHLORIDE: 100 INJECTION, SOLUTION INTRAMUSCULAR; INTRAVENOUS at 23:24

## 2019-01-01 RX ADMIN — PANTOPRAZOLE SODIUM 40 MG: 40 TABLET, DELAYED RELEASE ORAL at 06:17

## 2019-01-01 RX ADMIN — SENNOSIDES AND DOCUSATE SODIUM 1 TABLET: 8.6; 5 TABLET ORAL at 08:38

## 2019-01-01 RX ADMIN — LACTOBACILLUS TAB 4 TABLET: TAB at 15:45

## 2019-01-01 RX ADMIN — POLYETHYLENE GLYCOL 3350 17 G: 17 POWDER, FOR SOLUTION ORAL at 09:09

## 2019-01-01 RX ADMIN — AMANTADINE HYDROCHLORIDE 100 MG: 100 CAPSULE ORAL at 08:38

## 2019-01-01 RX ADMIN — CEFEPIME 1 G: 1 INJECTION, POWDER, FOR SOLUTION INTRAMUSCULAR; INTRAVENOUS at 10:18

## 2019-01-01 RX ADMIN — MAGNESIUM SULFATE HEPTAHYDRATE 2 G: 40 INJECTION, SOLUTION INTRAVENOUS at 16:02

## 2019-01-01 RX ADMIN — Medication 10 ML: at 08:29

## 2019-01-01 RX ADMIN — HEPARIN SODIUM 5000 UNITS: 5000 INJECTION INTRAVENOUS; SUBCUTANEOUS at 17:12

## 2019-01-01 RX ADMIN — CEFEPIME 1 G: 1 INJECTION, POWDER, FOR SOLUTION INTRAMUSCULAR; INTRAVENOUS at 08:39

## 2019-01-01 RX ADMIN — PROPOFOL 10 MG: 10 INJECTION, EMULSION INTRAVENOUS at 07:41

## 2019-01-01 RX ADMIN — PROPOFOL 30 MG: 10 INJECTION, EMULSION INTRAVENOUS at 13:47

## 2019-01-01 RX ADMIN — PROPOFOL 20 MG: 10 INJECTION, EMULSION INTRAVENOUS at 07:34

## 2019-01-01 RX ADMIN — Medication 10 ML: at 12:49

## 2019-01-01 RX ADMIN — NEPHROCAP 1 MG: 1 CAP ORAL at 09:10

## 2019-01-01 RX ADMIN — AMANTADINE HYDROCHLORIDE 100 MG: 100 CAPSULE ORAL at 09:10

## 2019-01-01 RX ADMIN — LACTOBACILLUS TAB 4 TABLET: TAB at 15:21

## 2019-01-01 RX ADMIN — PRAVASTATIN SODIUM 80 MG: 80 TABLET ORAL at 21:09

## 2019-01-01 RX ADMIN — BARIUM SULFATE 80 ML: 400 SUSPENSION ORAL at 14:33

## 2019-01-01 RX ADMIN — Medication 10 ML: at 06:45

## 2019-01-01 RX ADMIN — SODIUM CHLORIDE 500 ML: 9 INJECTION, SOLUTION INTRAVENOUS at 03:59

## 2019-01-01 RX ADMIN — LACTOBACILLUS TAB 4 TABLET: TAB at 21:13

## 2019-01-01 RX ADMIN — NEPHROCAP 1 MG: 1 CAP ORAL at 10:27

## 2019-01-01 RX ADMIN — PANTOPRAZOLE SODIUM 40 MG: 40 TABLET, DELAYED RELEASE ORAL at 05:59

## 2019-01-01 RX ADMIN — SODIUM CHLORIDE 50 ML/HR: 9 INJECTION, SOLUTION INTRAVENOUS at 06:45

## 2019-01-01 RX ADMIN — Medication 10 ML: at 08:42

## 2019-01-01 RX ADMIN — NEPHROCAP 1 MG: 1 CAP ORAL at 08:29

## 2019-01-01 RX ADMIN — SODIUM CHLORIDE 1000 ML: 900 IRRIGANT IRRIGATION at 18:23

## 2019-01-01 RX ADMIN — POLYETHYLENE GLYCOL 3350 17 G: 17 POWDER, FOR SOLUTION ORAL at 08:38

## 2019-01-01 RX ADMIN — Medication 10 ML: at 03:35

## 2019-01-01 RX ADMIN — LACTOBACILLUS TAB 4 TABLET: TAB at 08:29

## 2019-01-01 RX ADMIN — SODIUM CHLORIDE 1000 ML: 9 INJECTION, SOLUTION INTRAVENOUS at 08:39

## 2019-01-01 RX ADMIN — LIDOCAINE HYDROCHLORIDE 60 MG: 20 INJECTION, SOLUTION INFILTRATION; PERINEURAL at 07:34

## 2019-01-01 RX ADMIN — CEFTRIAXONE SODIUM 1 G: 1 INJECTION, POWDER, FOR SOLUTION INTRAMUSCULAR; INTRAVENOUS at 21:10

## 2019-01-01 RX ADMIN — SODIUM CHLORIDE 1000 ML: 9 INJECTION, SOLUTION INTRAVENOUS at 18:23

## 2019-01-01 RX ADMIN — LACTOBACILLUS TAB 4 TABLET: TAB at 09:41

## 2019-01-01 RX ADMIN — LACTOBACILLUS TAB 4 TABLET: TAB at 21:46

## 2019-01-01 RX ADMIN — PRAVASTATIN SODIUM 80 MG: 80 TABLET ORAL at 21:35

## 2019-01-01 RX ADMIN — HEPARIN SODIUM 5000 UNITS: 5000 INJECTION INTRAVENOUS; SUBCUTANEOUS at 09:42

## 2019-01-01 RX ADMIN — PROPOFOL 20 MG: 10 INJECTION, EMULSION INTRAVENOUS at 07:37

## 2019-01-01 RX ADMIN — BARIUM SULFATE 50 G: 0.81 POWDER, FOR SUSPENSION ORAL at 14:32

## 2019-01-01 RX ADMIN — PRAVASTATIN SODIUM 80 MG: 80 TABLET ORAL at 21:12

## 2019-01-01 RX ADMIN — SODIUM CHLORIDE: 9 INJECTION, SOLUTION INTRAVENOUS at 14:06

## 2019-01-01 RX ADMIN — POLYETHYLENE GLYCOL 3350 17 G: 17 POWDER, FOR SOLUTION ORAL at 10:27

## 2019-01-01 RX ADMIN — LACTOBACILLUS TAB 4 TABLET: TAB at 21:12

## 2019-01-01 RX ADMIN — PRAVASTATIN SODIUM 80 MG: 80 TABLET ORAL at 21:13

## 2019-01-01 ASSESSMENT — PAIN SCALES - GENERAL
PAINLEVEL_OUTOF10: 0
PAINLEVEL_OUTOF10: 2
PAINLEVEL_OUTOF10: 0

## 2019-01-01 ASSESSMENT — ENCOUNTER SYMPTOMS
CHEST TIGHTNESS: 0
GASTROINTESTINAL NEGATIVE: 1
STRIDOR: 0
EYES NEGATIVE: 1
COUGH: 0
TROUBLE SWALLOWING: 0
SHORTNESS OF BREATH: 0
CHEST TIGHTNESS: 0
CHEST TIGHTNESS: 0
SHORTNESS OF BREATH: 0
WHEEZING: 0
SHORTNESS OF BREATH: 0
WHEEZING: 0
SHORTNESS OF BREATH: 1
BLOOD IN STOOL: 0
SHORTNESS OF BREATH: 0
GASTROINTESTINAL NEGATIVE: 1
NAUSEA: 0
RHINORRHEA: 0
STRIDOR: 0
CHEST TIGHTNESS: 0
STRIDOR: 0
ABDOMINAL PAIN: 0
COUGH: 1
ABDOMINAL PAIN: 0
SORE THROAT: 0
EYES NEGATIVE: 1
BLOOD IN STOOL: 0
COUGH: 0
ABDOMINAL PAIN: 0
COUGH: 0
ABDOMINAL PAIN: 0
ABDOMINAL DISTENTION: 1
GASTROINTESTINAL NEGATIVE: 1
EYE ITCHING: 0
BLOOD IN STOOL: 0
SORE THROAT: 0
ABDOMINAL PAIN: 0
DIARRHEA: 0
CHEST TIGHTNESS: 0
DIARRHEA: 0
WHEEZING: 0
CHEST TIGHTNESS: 0
ABDOMINAL PAIN: 0
EYES NEGATIVE: 1
ABDOMINAL PAIN: 0
ABDOMINAL PAIN: 0
RESPIRATORY NEGATIVE: 1
WHEEZING: 0
WHEEZING: 0
RESPIRATORY NEGATIVE: 1
SHORTNESS OF BREATH: 0
GASTROINTESTINAL NEGATIVE: 1
WHEEZING: 0
NAUSEA: 0
SHORTNESS OF BREATH: 0
APNEA: 0
VOMITING: 0
COUGH: 0
CHEST TIGHTNESS: 0
EYES NEGATIVE: 1
ALLERGIC/IMMUNOLOGIC NEGATIVE: 1
COUGH: 0
NAUSEA: 0
NAUSEA: 0
SHORTNESS OF BREATH: 0
STRIDOR: 0
CONSTIPATION: 0
VOMITING: 0
NAUSEA: 0
WHEEZING: 0
EYES NEGATIVE: 1
COLOR CHANGE: 0
SHORTNESS OF BREATH: 0
BACK PAIN: 0
VOICE CHANGE: 0
ABDOMINAL DISTENTION: 0
COUGH: 0
DIARRHEA: 0
NAUSEA: 0
RHINORRHEA: 0
CHOKING: 0
GASTROINTESTINAL NEGATIVE: 1
VOMITING: 0
STRIDOR: 0
RESPIRATORY NEGATIVE: 1
BLOOD IN STOOL: 0
NAUSEA: 0

## 2019-01-01 ASSESSMENT — PULMONARY FUNCTION TESTS
PIF_VALUE: 1
PIF_VALUE: 10
PIF_VALUE: 1
PIF_VALUE: 10
PIF_VALUE: 13
PIF_VALUE: 0
PIF_VALUE: 10
PIF_VALUE: 14
PIF_VALUE: 19
PIF_VALUE: 4
PIF_VALUE: 1
PIF_VALUE: 6
PIF_VALUE: 1
PIF_VALUE: 1
PIF_VALUE: 5
PIF_VALUE: 12
PIF_VALUE: 1
PIF_VALUE: 13
PIF_VALUE: 4
PIF_VALUE: 12
PIF_VALUE: 16
PIF_VALUE: 1
PIF_VALUE: 3
PIF_VALUE: 0
PIF_VALUE: 4
PIF_VALUE: 0
PIF_VALUE: 1
PIF_VALUE: 19
PIF_VALUE: 12
PIF_VALUE: 1
PIF_VALUE: 1
PIF_VALUE: 18
PIF_VALUE: 13
PIF_VALUE: 1
PIF_VALUE: 0
PIF_VALUE: 1
PIF_VALUE: 13
PIF_VALUE: 0
PIF_VALUE: 13
PIF_VALUE: 4
PIF_VALUE: 13
PIF_VALUE: 2
PIF_VALUE: 2
PIF_VALUE: 0
PIF_VALUE: 1
PIF_VALUE: 12
PIF_VALUE: 0
PIF_VALUE: 0
PIF_VALUE: 3
PIF_VALUE: 13
PIF_VALUE: 13
PIF_VALUE: 0
PIF_VALUE: 0
PIF_VALUE: 12
PIF_VALUE: 1
PIF_VALUE: 1

## 2019-01-01 ASSESSMENT — PAIN DESCRIPTION - PAIN TYPE: TYPE: ACUTE PAIN

## 2019-01-01 ASSESSMENT — PAIN DESCRIPTION - FREQUENCY: FREQUENCY: CONTINUOUS

## 2019-01-01 ASSESSMENT — PAIN DESCRIPTION - DESCRIPTORS: DESCRIPTORS: DISCOMFORT

## 2019-03-22 PROBLEM — N17.9 AKI (ACUTE KIDNEY INJURY) (HCC): Status: ACTIVE | Noted: 2019-01-01

## 2019-05-22 NOTE — PROGRESS NOTES
Subsequent Progress Note  Patient: Daron Vila  YOB: 1931  MRN: [id number]    Chief Complaint: dnicm dizzy cad mr  Chief Complaint   Patient presents with    [facility], patient had a cranial bleed       CV Data:  2008 CRTD  EF 30-35  1/2018 SPect negative  1/2018 Echo EF 40% 1-2AR, 2-3 TR  12/2008 CATH Minimal CAD  SDH surgery craniotomy 3/30/2019     Subjective/HPI: no cp no sob walking w walker. Had Craniotomy at [facility] for SDH.  Doing pretty well no cp    EKG:    Past Medical History:   Diagnosis Date    Anticoagulant long-term use     xarelto    Atrial fibrillation ([address]     CAD (coronary artery disease)     Cardiomyopathy ([address]     Chronic kidney disease     Hyperlipidemia     Hypertension     Osteoarthritis     Type II or unspecified type diabetes mellitus without mention of complication, not stated as uncontrolled        Past Surgical History:   Procedure Laterality Date    CORONARY ANGIOPLASTY      DIAGNOSTIC CARDIAC CATH LAB PROCEDURE      EYE SURGERY Bilateral 03/2018    PACEMAKER INSERTION N/A 1994    SKIN CANCER EXCISION  2016    SKIN CANCER EXCISION         Family History   Problem Relation Age of Onset    Cancer Mother     Coronary Art Dis Father     Coronary Art Dis Brother     Hypertension Brother     High Cholesterol Brother     Diabetes Brother        Social History     Socioeconomic History    Marital status:      Spouse name: None    Number of children: None    Years of education: None    Highest education level: None   Occupational History    None   Social Needs    Financial resource strain: None    Food insecurity:     Worry: None     Inability: None    Transportation needs:     Medical: None     Non-medical: None   Tobacco Use    Smoking status: Never Smoker    Smokeless tobacco: Never Used   Substance and Sexual Activity    Alcohol use: No     Alcohol/week: 0.0 oz    Drug use: No    Sexual activity: None   Lifestyle    Physical activity:     Days per week: None     Minutes per session: None    Stress: None   Relationships    Social connections:     Talks on phone: None     Gets together: None     Attends Judaism service: None     Active member of club or organization: None     Attends meetings of clubs or organizations: None     Relationship status: None    Intimate partner violence:     Fear of current or ex partner: None     Emotionally abused: None     Physically abused: None     Forced sexual activity: None   Other Topics Concern    None   Social History Narrative    None       No Known Allergies    Current Outpatient Medications   Medication Sig Dispense Refill    alogliptin (NESINA) 25 MG TABS tablet Take 25 mg by mouth daily      amantadine (SYMMETREL) 100 MG capsule Take 100 mg by mouth daily      carvedilol (COREG) 12.5 MG tablet Take 12.5 mg by mouth 2 times daily (with meals)      glipiZIDE (GLUCOTROL) 5 MG tablet Take 2.5 mg by mouth daily      ondansetron (ZOFRAN) 4 MG tablet Take 4 mg by mouth every 8 hours as needed for Nausea or Vomiting      pantoprazole (PROTONIX) 40 MG tablet Take 40 mg by mouth daily      furosemide (LASIX) 20 MG tablet Take 1 tablet by mouth daily 30 tablet 3    blood glucose monitor strips by Other route 4 times daily 400 strip 2    CPAP Machine MISC by Does not apply route New Auto CPAP 8-20 cm with [place] [employer] Simplus, Size L 1 each 0    Respiratory Therapy Supplies JOE New CPAP mask and supplies 1 Device 0    glucose blood VI test strips (EXACTECH TEST) strip 1 each by In Vitro route 4 times daily As needed. 100 each 11    Multiple Vitamin TABS Take 1 tablet by mouth daily       pravastatin (PRAVACHOL) 80 MG tablet Take 80 mg by mouth daily. No current facility-administered medications for this visit. Review of Systems:   Review of Systems   Constitutional: Negative. Negative for diaphoresis and fatigue.    HENT: 06/14/2012     Lab Results   Component Value Date    TRIG 84 09/21/2016    TRIG 112 11/23/2015    TRIG 111 06/14/2012     Lab Results   Component Value Date    HDL 47 09/21/2016    HDL 46 11/23/2015    HDL 47 06/14/2012     Lab Results   Component Value Date    LDLCALC 92 09/21/2016    LDLCALC 94 11/23/2015    LDLCALC 102 06/14/2012     No results found for: LABVLDL, VLDL  Lab Results   Component Value Date    CHOLHDLRATIO 3.6 06/14/2012     CMP:    Lab Results   Component Value Date     03/28/2019    K 5.0 03/28/2019     03/28/2019    CO2 24 03/28/2019    BUN 35 03/28/2019    CREATININE 1.41 03/28/2019    GFRAA 57.5 03/28/2019    LABGLOM 47.5 03/28/2019    GLUCOSE 170 03/28/2019    GLUCOSE 141 12/30/2011    PROT 7.4 03/28/2019    LABALBU 3.9 03/28/2019    CALCIUM 9.5 03/28/2019    BILITOT 0.7 03/28/2019    ALKPHOS 62 03/28/2019    AST 32 03/28/2019    ALT 25 03/28/2019     BMP:    Lab Results   Component Value Date     03/28/2019    K 5.0 03/28/2019     03/28/2019    CO2 24 03/28/2019    BUN 35 03/28/2019    LABALBU 3.9 03/28/2019    CREATININE 1.41 03/28/2019    CALCIUM 9.5 03/28/2019    GFRAA 57.5 03/28/2019    LABGLOM 47.5 03/28/2019    GLUCOSE 170 03/28/2019    GLUCOSE 141 12/30/2011     Magnesium:  No results found for: MG  TSH:No results found for: TSHFT4, TSH    Patient Active Problem List   Diagnosis    Type II diabetes mellitus, uncontrolled ([facility] [address]    CHF (congestive heart failure)    BPH (benign prostatic hyperplasia)    Hypercholesteremia    CKD (chronic kidney disease) stage 3, GFR 30-59 ml/min (HCC)    Dystrophy of anterior cornea    Acute sinusitis    Basal cell carcinoma of nose    Retained foreign body of eyelid    Tear film insufficiency    Hypertension    Atrial fibrillation (HCC)    Anticoagulant long-term use    Cardiomyopathy ([facility] [address]    AICD (automatic cardioverter/defibrillator) present    DCM (dilated cardiomyopathy) ([facility] [address]    Coronary artery disease involving native coronary artery of native heart with angina pectoris ([facility]    CHAYA (acute kidney injury) ([facility]       Medications Discontinued During This Encounter   Medication Reason    B Complex Vitamins (VITAMIN B COMPLEX PO) LIST CLEANUP    benazepril (LOTENSIN) 20 MG tablet LIST CLEANUP    carvedilol (COREG) 25 MG tablet     chlorothiazide (DIURIL) 250 MG tablet LIST CLEANUP    Coenzyme Q10 (COQ10) 100 MG CAPS LIST CLEANUP    finasteride (PROSCAR) 5 MG tablet LIST CLEANUP    glimepiride (AMARYL) 1 MG tablet LIST CLEANUP    saxagliptin (ONGLYZA) 2.5 MG TABS tablet LIST CLEANUP    Selenium 100 MCG TABS LIST CLEANUP    spironolactone (ALDACTONE) 25 MG tablet LIST CLEANUP    tamsulosin (FLOMAX) 0.4 MG capsule LIST CLEANUP    XARELTO 15 MG TABS tablet LIST CLEANUP       Modified Medications    No medications on file       Orders Placed This Encounter   Medications    furosemide (LASIX) 20 MG tablet     Sig: Take 1 tablet by mouth daily     Dispense:  30 tablet     Refill:  3       Assessment/Plan:    1. Congestive heart failure, unspecified HF chronicity, unspecified heart failure type ([facility]  decomensated - add back Furosemide 20 qd RTO 2 weeks w Labs  - Basic Metabolic Panel; Future    2. Hypercholesteremia  Statin     3. Essential hypertension   stable     4. Chronic atrial fibrillation (HCC)   No OAC due to SDH     5. Coronary artery disease involving native coronary artery of native heart with angina pectoris (HCC)  Mild no angina     6. CKD- consult Nephrology        Counseling:  Heart Healthy Lifestyle, Low Salt Diet, Volume Restriction 1500cc per day, Take Precautions to Prevent Falls and Walk Daily    Return in about 2 weeks (around 6/5/2019) for Lab 1-2 d before visit.       Electronically signed by Juan Arriaga MD on 5/22/2019 at 1:17 PM

## 2019-06-04 PROBLEM — I25.10 CORONARY ARTERIOSCLEROSIS IN NATIVE ARTERY: Status: ACTIVE | Noted: 2018-01-01

## 2019-06-04 PROBLEM — R82.71 BACTERIURIA: Status: ACTIVE | Noted: 2019-01-01

## 2019-06-04 PROBLEM — I48.91 ATRIAL FIBRILLATION (HCC): Status: ACTIVE | Noted: 2019-01-01

## 2019-06-04 PROBLEM — G93.40 ENCEPHALOPATHY: Status: ACTIVE | Noted: 2019-01-01

## 2019-06-04 PROBLEM — A41.9 SEPSIS DUE TO URINARY TRACT INFECTION (HCC): Status: ACTIVE | Noted: 2019-01-01

## 2019-06-04 PROBLEM — D72.829 LEUKOCYTOSIS: Status: ACTIVE | Noted: 2019-01-01

## 2019-06-04 PROBLEM — Z79.01 ANTICOAGULATED: Status: ACTIVE | Noted: 2019-01-01

## 2019-06-04 PROBLEM — E11.65 TYPE 2 DIABETES MELLITUS WITH HYPERGLYCEMIA (HCC): Status: ACTIVE | Noted: 2019-01-01

## 2019-06-04 PROBLEM — N39.0 UTI (URINARY TRACT INFECTION): Status: ACTIVE | Noted: 2019-01-01

## 2019-06-04 PROBLEM — S06.5XAA SUBDURAL HEMATOMA (HCC): Status: ACTIVE | Noted: 2019-01-01

## 2019-06-04 PROBLEM — Z95.810 IMPLANTABLE CARDIOVERTER-DEFIBRILLATOR (ICD) IN SITU: Status: ACTIVE | Noted: 2018-06-25

## 2019-06-04 PROBLEM — N39.0 SEPSIS DUE TO URINARY TRACT INFECTION (HCC): Status: ACTIVE | Noted: 2019-01-01

## 2019-06-04 PROBLEM — N17.9 ACUTE KIDNEY INJURY (HCC): Status: ACTIVE | Noted: 2019-01-01

## 2019-06-04 PROBLEM — R47.01 APHASIA: Status: ACTIVE | Noted: 2019-01-01

## 2019-06-04 PROBLEM — G51.0 LEFT-SIDED BELL'S PALSY: Status: ACTIVE | Noted: 2019-01-01

## 2019-06-04 PROBLEM — I42.0 DILATED CARDIOMYOPATHY (HCC): Status: ACTIVE | Noted: 2019-01-01

## 2019-06-04 NOTE — ED NOTES
Pt has very small BM in underwear. Pt was cleaned and changed. Pt was placed on gown and clothes were left at bedside in bag and wife was advised. Clean brief was placed under the pt and pt was given warm blankets. Pt is resting comfortably at this time. Pt has no signs of distress. Noted pt has urine out at 100ml at this time. Pt has no questions and was advised of plan of care. pts wife is at the bedside with their daughter.       Shaina Avendano RN  06/04/19 [id number]

## 2019-06-04 NOTE — PROGRESS NOTES
Renal Adjustment Per Protocol: Cipro 400 mg IV every 12 hours changed to Cipro 400 mg IV every 24 hours based on      Recent Labs     06/04/19  0230   CREATININE 2.87*    Estimated Creatinine Clearance: 23 mL/min (A) (based on SCr of 2.87 mg/dL (H)).

## 2019-06-04 NOTE — H&P
Substance and Sexual Activity    Alcohol use: No     Alcohol/week: 0.0 oz    Drug use: No    Sexual activity: Not on file   Lifestyle    Physical activity:     Days per week: Not on file     Minutes per session: Not on file    Stress: Not on file   Relationships    Social connections:     Talks on phone: Not on file     Gets together: Not on file     Attends Faith service: Not on file     Active member of club or organization: Not on file     Attends meetings of clubs or organizations: Not on file     Relationship status: Not on file    Intimate partner violence:     Fear of current or ex partner: Not on file     Emotionally abused: Not on file     Physically abused: Not on file     Forced sexual activity: Not on file   Other Topics Concern    Not on file   Social History Narrative    Not on file     MEDICATIONS:   Prior to Admission medications    Medication Sig Start Date End Date Taking?  Authorizing Provider   oxybutynin (DITROPAN) 5 MG tablet Take 2.5 mg by mouth 2 times daily   Yes Historical Provider, MD   SITagliptin (JANUVIA) 50 MG tablet Take 50 mg by mouth daily   Yes Historical Provider, MD   amantadine (SYMMETREL) 100 MG capsule Take 100 mg by mouth daily   Yes Historical Provider, MD   carvedilol (COREG) 12.5 MG tablet Take 12.5 mg by mouth 2 times daily (with meals)   Yes Historical Provider, MD   glipiZIDE (GLUCOTROL) 5 MG tablet Take 2.5 mg by mouth daily   Yes Historical Provider, MD   pantoprazole (PROTONIX) 40 MG tablet Take 40 mg by mouth daily   Yes Historical Provider, MD   furosemide (LASIX) 20 MG tablet Take 1 tablet by mouth daily 5/22/19  Yes Divya Whaley MD   blood glucose monitor strips by Other route 4 times daily 1/28/19  Yes Bang Limon MD   CPAP Machine MISC by Does not apply route New Auto CPAP 8-20 cm with [employer] [employer] Simplus, Size L 4/2/18  Yes Tatiana Denton MD   Respiratory Therapy Supplies JOE New CPAP mask and supplies 3/14/18  Yes Tatiana Denton MD glucose blood VI test strips (EXACTECH TEST) strip 1 each by In Vitro route 4 times daily As needed. 10/4/17  Yes Ninaf Celis MD   pravastatin (PRAVACHOL) 80 MG tablet Take 80 mg by mouth daily. Yes Historical Provider, MD        ALLERGIES: Patient has no known allergies. REVIEW OF SYSTEM:   ROS as noted in HPI, 12 point ROS reviewed and otherwise negative. OBJECTIVE  PHYSICAL EXAM:   Vitals:    06/04/19 0205 06/04/19 0347 06/04/19 0400   BP: 137/81 123/68 118/66   Pulse: 75 59 60   Resp: 20 18 18   Temp: 98.5 °F (36.9 °C)     TempSrc: Oral     SpO2: 98% 94% 93%   Weight: 215 lb (97.5 kg)     Height: 6' 2\" (1.88 m)         General appearance: alert, appears stated age and cooperative  CONSTITUTIONAL:  no apparent distress  ENT:  normocepalic, without obvious abnormality, atraumatic  NECK:  supple, symmetrical, trachea midline  Lungs: clear to auscultation bilaterally  Heart: regular rate and rhythm, S1, S2 normal   Abdomen: soft lax, not tender, not distended, positive bowel sounds  Extremities: extremities normal, atraumatic, no cyanosis, trace edema      DATA:     Diagnostic tests reviewed for today's visit:    Most recent labs and imaging results reviewed.    Labs:   Recent Results (from the past 72 hour(s))   Urinalysis Reflex to Culture    Collection Time: 06/04/19  2:15 AM   Result Value Ref Range    Color, UA DARK YELLOW (A) Straw/Yellow    Clarity, UA CLOUDY (A) Clear    Glucose, Ur Negative Negative mg/dL    Bilirubin Urine Negative Negative    Ketones, Urine Negative Negative mg/dL    Specific Gravity, UA 1.017 1.005 - 1.030    Blood, Urine LARGE (A) Negative    pH, UA 5.0 5.0 - 9.0    Protein, UA 30 (A) Negative mg/dL    Urobilinogen, Urine 0.2 <2.0 E.U./dL    Nitrite, Urine Negative Negative    Leukocyte Esterase, Urine MODERATE (A) Negative    Urine Reflex to Culture YES    CBC Auto Differential    Collection Time: 06/04/19  2:30 AM   Result Value Ref Range    WBC 16.7 (H) 4.8 - 10.8 K/uL RBC 3.56 (L) 4.70 - 6.10 M/uL    Hemoglobin 10.8 (L) 14.0 - 18.0 g/dL    Hematocrit 32.2 (L) 42.0 - 52.0 %    MCV 90.7 80.0 - 100.0 fL    MCH 30.3 27.0 - 31.3 pg    MCHC 33.5 33.0 - 37.0 %    RDW 17.3 (H) 11.5 - 14.5 %    Platelets 273 919 - 471 K/uL    Neutrophils % 91.0 %    Lymphocytes % 3.0 %    Monocytes % 5.6 %    Eosinophils % 0.0 %    Basophils % 0.5 %    Neutrophils # 15.4 (H) 1.4 - 6.5 K/uL    Lymphocytes # 0.5 (L) 1.0 - 4.8 K/uL    Monocytes # 1.0 (H) 0.2 - 0.8 K/uL    Eosinophils # 0.0 0.0 - 0.7 K/uL    Basophils # 0.0 0.0 - 0.2 K/uL    Bands Relative 1 %   Comprehensive Metabolic Panel    Collection Time: 06/04/19  2:30 AM   Result Value Ref Range    Sodium 131 (L) 135 - 144 mEq/L    Potassium 4.6 3.4 - 4.9 mEq/L    Chloride 93 (L) 95 - 107 mEq/L    CO2 23 20 - 31 mEq/L    Anion Gap 15 9 - 15 mEq/L    Glucose 140 (H) 70 - 99 mg/dL    BUN 45 (H) 8 - 23 mg/dL    CREATININE 2.87 (H) 0.70 - 1.20 mg/dL    GFR Non-African American 20.9 (L) >60    GFR  25.3 (L) >60    Calcium 8.8 8.5 - 9.9 mg/dL    Total Protein 6.9 6.3 - 8.0 g/dL    Alb 3.2 (L) 3.5 - 4.6 g/dL    Total Bilirubin 1.0 (H) 0.2 - 0.7 mg/dL    Alkaline Phosphatase 107 (H) 35 - 104 U/L    ALT 25 0 - 41 U/L    AST 18 0 - 40 U/L    Globulin 3.7 (H) 2.3 - 3.5 g/dL   Microscopic Urinalysis    Collection Time: 06/04/19  4:10 AM   Result Value Ref Range    RBC, UA  (A) 0 - 2 /HPF    Bacteria, UA MODERATE (A) /HPF    Hyaline Casts, UA 3-5 0 - 5 /HPF    WBC, UA 20-50 (A) 0 - 5 /HPF    Epi Cells 0-2 0 - 5 /HPF   Lactic Acid, Plasma    Collection Time: 06/04/19  4:15 AM   Result Value Ref Range    Lactic Acid 1.2 0.5 - 2.2 mmol/L   CK    Collection Time: 06/04/19  4:15 AM   Result Value Ref Range    Total CK 40 0 - 190 U/L       ASSESSMENT AND PLAN  Patient Active Hospital Problem List:  - UTI    - Dehydration    - CHAYA on CKD    - AF    - CAD    - T2DM    - Hyperlipidemia    - SURENDRA    Gentle hydration, monitor I/o and serum Cr, consult Nephrology. Dc lasix for now   Started iv cipro in ED, will continue the same, Pharm to renally dose. Resume home medication  accuchecks and iss, hold on oral agents. Resume home statin   Nocturnal CPAP        VTE Prophylaxis: low molecular weight heparin -  start    Plan of care discussed with: family    Additional work up or/and treatment plan may be added today or thereafter based on clinical progression. I am managing a portion of pt care. Some medical issues are handled by other specialists. Additional work up and treatment should be done by my colleague hospitalist and at out pt setting by pt PCP and other out pt providers.      SIGNATURE: Yulia Esquivel MD  DATE: June 4, 2019  TIME: 5:01 AM

## 2019-06-04 NOTE — PROGRESS NOTES
Pt assessment completed. Alert and oriented to self and time \"June 2019\", disoriented to place stated \"im in the [facility]\" He stated \"I just had a bowel resection for a blockage yesterday\". R sided droop with smile. Push/pulls equal. PERRLA. Lung sounds clear bilaterally. RA. S1, S2 audible, no adventitious sounds. Peripheral pulses palpable. Bowel sounds active x 4 quadrants. Soft rotund non-tender. Skin Intact. Patient denies dyspnea, pain, and any further needs at this time. 1520 Patients granddaughter at bedside. Spoke to her regarding patients medications. She received a text message picture from her Aunt with current list of medication. With help of family current home med rec is from discharge from [facility] per Dr. Philippe Cheadle. Prior to patients stay in Rehab for subdural hematoma in March. Patient saw Dr. Spike Mckinney as an outpatient and was receiving Flowmax and Proscar. Notified Hospitalist that at discharge from [address] patient was placed on Oxybutynin for 30 days and Proscar and Flomax had been discontinued per request.     [address] clamped for 2 hours with less than 5 mls in tube. Unable to obtain sample at this time. Nephrology and Hospitalist notified. Nephrology stated to unclamp. Tube unclamped. Urine returned, sterile sample obtained. Sent to lab    Will continue to monitor and hourly round.

## 2019-06-04 NOTE — PROGRESS NOTES
Pt arrived to unit. Vitals obtained. Family went home. Pt very lethargic, but arrouses when you say his name and can answer questions appropriately before quickly falling back asleep. Huynh in place draining herberth cloudy urine.   Electronically signed by Joana Benitez RN on 6/4/2019 at 6:35 AM

## 2019-06-04 NOTE — ED NOTES
Pt is resting in bed at this time, pt was placed on bedside monitor, family and pt were given update on plan of care and advised of admission.       Tiffanie Mcgowan RN  06/04/19 1971

## 2019-06-04 NOTE — ED PROVIDER NOTES
[address] [facility] ED  eMERGENCY dEPARTMENT eNCOUnter      Pt Name: Cory Gilbert  MRN: [id number]  [place] 8/29/1931  Date of evaluation: 6/4/2019  Provider: TAJ Ochoa [id number]       Chief Complaint   Patient presents with    Urinary Retention         HISTORY OF PRESENT ILLNESS   (Location/Symptom, Timing/Onset,Context/Setting, Quality, Duration, Modifying Factors, Severity)  Note limiting factors. Cory Gilbert is a 80 y.o. male who presents to the emergency department by squad with family with report of no urine output today and onset of abdominal distention. Patient states she's felt worn out and lousy today denies any pain or discomfort evaluation. States that his belly feels more distended than usual.  Family provided additional information as patient is had recent memory problems following hospitalization for intracranial bleeds family member states that his last known urinary output was early yesterday morning around 10 AM.  They state the patient Reglan takes Lasix and has not had normal urine output. He states that he has had a much decreased appetite and has eaten very little today. Patient states he doesn't feel like he wants to have anything to eat because of the pressure in his abdomen distended feeling. States he does not feel hungry. Family states he has been taking in fluids very well. They deny any reports of nausea or vomiting patient denies any diarrhea. He states he feels he cannot urinate. Patient family deny any fevers patient denies any headaches or dizziness, denies chest pain shortness of breath. Twice states that he's had a intermittent cough over the past few days. Nursing Notes were reviewed. REVIEW OF SYSTEMS    (2-9 systems for level 4, 10 or more for level 5)     Review of Systems   Constitutional: Positive for appetite change. Negative for activity change, chills, diaphoresis, fatigue and fever.    HENT: Negative for on file     Non-medical: Not on file   Tobacco Use    Smoking status: Never Smoker    Smokeless tobacco: Never Used   Substance and Sexual Activity    Alcohol use: No     Alcohol/week: 0.0 oz    Drug use: No    Sexual activity: Not on file   Lifestyle    Physical activity:     Days per week: Not on file     Minutes per session: Not on file    Stress: Not on file   Relationships    Social connections:     Talks on phone: Not on file     Gets together: Not on file     Attends Amish service: Not on file     Active member of club or organization: Not on file     Attends meetings of clubs or organizations: Not on file     Relationship status: Not on file    Intimate partner violence:     Fear of current or ex partner: Not on file     Emotionally abused: Not on file     Physically abused: Not on file     Forced sexual activity: Not on file   Other Topics Concern    Not on file   Social History Narrative    Not on file       SCREENINGS      @KF([id number])@      PHYSICAL EXAM    (up to 7 for level 4, 8 or more for level 5)     ED Triage Vitals [06/04/19 0205]   BP Temp Temp Source Pulse Resp SpO2 Height Weight   137/81 98.5 °F (36.9 °C) Oral 75 20 98 % 6' 2\" (1.88 m) 215 lb (97.5 kg)       Physical Exam   Constitutional: He appears well-developed and well-nourished. No distress. HENT:   Head: Normocephalic and atraumatic. Right Ear: External ear normal.   Left Ear: External ear normal.   Nose: Nose normal.   Eyes: Conjunctivae are normal. Right eye exhibits no discharge. Left eye exhibits no discharge. Neck: Normal range of motion. Cardiovascular: Normal rate, regular rhythm and intact distal pulses. Pulmonary/Chest: Effort normal and breath sounds normal.   Abdominal: Soft. Bowel sounds are normal. He exhibits distension. He exhibits no mass. There is no tenderness. There is no rebound and no guarding. Musculoskeletal: He exhibits no tenderness. Neurological: He is alert. No sensory deficit. Patient is alert to self and appears to be alert to current situations however family is contradicting this stating that he has had recent memory problems due to recent subdural hematoma. They have corrected his information provided more the patient is alert to himself his current situation. Abnormal recent recall. Skin: Skin is warm and dry. Capillary refill takes less than 2 seconds. He is not diaphoretic.        DIAGNOSTIC RESULTS     EKG: All EKG's are interpreted by the Emergency Department Physician who either signs or Co-signsthis chart in the absence of a cardiologist.    Ventricular paced rhythm with occasional PVC 67 bpm  ms    RADIOLOGY:   Non-plain filmimages such as CT, Ultrasound and MRI are read by the radiologist. Plain radiographic images are visualized and preliminarily interpreted by the emergency physician with the below findings:    Portable chest x-ray shows no acute process no identifiable infiltrates or effusions    Interpretation per the Radiologist below, if available at the time ofthis note:    XR CHEST PORTABLE    (Results Pending)         ED BEDSIDE ULTRASOUND:   Performed by ED Physician - none    LABS:  Labs Reviewed   URINE RT REFLEX TO CULTURE - Abnormal; Notable for the following components:       Result Value    Color, UA DARK YELLOW (*)     Clarity, UA CLOUDY (*)     Blood, Urine LARGE (*)     Protein, UA 30 (*)     Leukocyte Esterase, Urine MODERATE (*)     All other components within normal limits   CBC WITH AUTO DIFFERENTIAL - Abnormal; Notable for the following components:    WBC 16.7 (*)     RBC 3.56 (*)     Hemoglobin 10.8 (*)     Hematocrit 32.2 (*)     RDW 17.3 (*)     Neutrophils # 15.4 (*)     Lymphocytes # 0.5 (*)     Monocytes # 1.0 (*)     All other components within normal limits   COMPREHENSIVE METABOLIC PANEL - Abnormal; Notable for the following components:    Sodium 131 (*)     Chloride 93 (*)     Glucose 140 (*)     BUN 45 (*)     CREATININE 2.87 (*) GFR Non- 20.9 (*)     GFR  25.3 (*)     Alb 3.2 (*)     Total Bilirubin 1.0 (*)     Alkaline Phosphatase 107 (*)     Globulin 3.7 (*)     All other components within normal limits   MICROSCOPIC URINALYSIS - Abnormal; Notable for the following components:    RBC, UA  (*)     Bacteria, UA MODERATE (*)     WBC, UA 20-50 (*)     All other components within normal limits   URINE CULTURE   CULTURE BLOOD #1   CULTURE BLOOD #2   LACTIC ACID, PLASMA   CK       All other labs were within normal range or not returned as of this dictation. EMERGENCY DEPARTMENT COURSE and DIFFERENTIAL DIAGNOSIS/MDM:   Vitals:    Vitals:    06/04/19 0205 06/04/19 0347 06/04/19 0400   BP: 137/81 123/68 118/66   Pulse: 75 59 60   Resp: 20 18 18   Temp: 98.5 °F (36.9 °C)     TempSrc: Oral     SpO2: 98% 94% 93%   Weight: 215 lb (97.5 kg)     Height: 6' 2\" (1.88 m)              MDM patient presents as afebrile nontoxic appearance no acute distress denying pain or discomfort. Patient is not tachycardic or hypotensive. Basic labs ordered for further evaluation due to patient's chronic history and ordered was placed for a Huynh catheter due to patient's urinary retention and noted urine in the bladder. The output was a dark cloudy urine sent for urinalysis. Patient's labs returned with a noted decrease in renal function compared to prior labs possibly related to acute dehydration and patient's warfarin use of Lasix per family report. IV fluid bolus of 500 mL's added conservatively due to patient's history of congestive heart failure and kidney disease. White blood cell count returns as greatly elevated compared to previous labs over 16. Additional labs and chest x-ray ordered for further evaluation of possible source. Family is been updated. Urinalysis return to the finding of a recurrent urinary tract infection.   Given the elevation of white blood cell count and the decrease in renal function acute on chronic as well as the patient's presentation of fatigue I suspect the patient is experiencing pyelonephritis. Family states that he has had multiple urinary tract infections over the past few weeks and now has an onset of urinary retention. I started the patient on IV Cipro to cover urinary tract infection pyelonephritis as well as the possibility of prostate involvement. EKG ventricular paced rhythm and the chest x-ray shows no acute changes. I spoke with the family about admission of the patient and they agree that he should be admitted. Plan is to admit to hospitalist for further treatment evaluation of a suspected pyelonephritis leukocytosis fatigue and urinary retention. Hospitalist has accepted admission of this patient. CRITICAL CARE TIME       CONSULTS:  None    PROCEDURES:  Unless otherwise noted below, none     Procedures    FINAL IMPRESSION      1. Pyelonephritis    2. Leukocytosis, unspecified type    3. Retention of urine    4. Fatigue, unspecified type          DISPOSITION/PLAN   DISPOSITION        PATIENT REFERRED TO:  No follow-up provider specified.     DISCHARGE MEDICATIONS:  New Prescriptions    No medications on file          (Please notethat portions of this note were completed with a voice recognition program.  Efforts were made to edit the dictations but occasionally words are mis-transcribed.)    Evonnie Litten, APRN - CNP (electronically signed)  Attending Emergency Physician         Evonnie Litten, APRN - CNP  06/04/19 9791

## 2019-06-04 NOTE — PROGRESS NOTES
[facility] Occupational Therapy      Date: 2019  Patient Name: Katy Weir        MRN: [id number]  Account: [de-identified]   : 1931  (80 y.o.)  Room: [place] [address]    Chart reviewed, attempted OT at 1400 for evaluation. Patient not seen 2° to:    [] Hold per nsg request    [x] Pt declined secondary to being too fatigued. [] Pt. off floor for test/procedure. Spoke to RN , RN aware. Will attempt again when able.     Electronically signed by MIRNA Gutierrez on 9590 at 2:03 PM

## 2019-06-04 NOTE — PROGRESS NOTES
Agree with H&P except for the following    A/P:  1. Sepsis ( WBC> 12 RR> 20) with CHAYA due to UTI with Pyuria and microscopic hematuria, present on admission: Switching to IV Rocephin to avoid QT prolongation in setting of a fib and renal impairment in addition to better sensitivity. Awaiting cultures and sensitivities. IVFs, Repeat CBC in AM.   2.  Dehydration with Hyponatremia and Hypotension due to Hypovolemia: IVF, Nephro on board. Repeat Labs, strict intake output. Daily weights, hold Lasix. Hold BP lowering agentsHolding BP lowering agents  3. CHAYA on CKD III (Baseline Cr 1.41 was 2.87) present on admission:  Nephro on board. Avoid nephrotoxic agents wherever possible. Gentle IVF. Repeat BMP in AM. Strict Intake output. Avoiding fluctuations in BP. 4. DMII with hyperglycemia: ISS, hypoglycemia protocol POCT Glucose TIDAC & QHS  5. A fib: rate controlled. Goal K and Mg 4.0 and 2.0, respectively. 6. CAD : Resume home meds. 7.  Hx of ICH with residual deficits: Neurochecks, monitor for changes in neuro exam. Currently at baseline. Reorient PRN. Fall precautions aspiration precautions.    Electronically signed by Tamara Flower MD on 6/4/2019 at 11:53 AM

## 2019-06-04 NOTE — CONSULTS
[facility]. Nephrology  Consult Note           Reason for Consult: CHAYA  Requesting Physician:  Dr. Amber Stark    Chief Complaint:  Weakness, decreased UOP  History Obtained From:  patient, electronic medical record    History of Present Ilness:    80y.o. year old male with history s/f T2DM, HTN, CHF, A.fib, HTN and CAD who presented for weakness, decreased UOP. Urine has been darker as well. + nausea, no vomiting or diarrhea. Pt on lasix as outpatient. On presentation UA c/f UTI w/ large blood, mod LE, many WBCs, RBCs, mod bacteria, WBC 16.7. Also w/ CHAYA w/ SCR 2.87 (baseline ~1.7-1.9 w/ eGFR mid/high 30s). Occasionally lower to 1.4. Na 131. Now getting fluids and abx. Past Medical History:        Diagnosis Date    Anticoagulant long-term use     xarelto    Atrial fibrillation (HCC)     CAD (coronary artery disease)     Cardiomyopathy ([facility] [address]     Chronic kidney disease     Hyperlipidemia     Hypertension     Osteoarthritis     Type II or unspecified type diabetes mellitus without mention of complication, not stated as uncontrolled        Past Surgical History:        Procedure Laterality Date    CORONARY ANGIOPLASTY      DIAGNOSTIC CARDIAC CATH LAB PROCEDURE      EYE SURGERY Bilateral 03/2018    PACEMAKER INSERTION N/A 1994    SKIN CANCER EXCISION  2016    SKIN CANCER EXCISION         Home Medications:    No current facility-administered medications on file prior to encounter.       Current Outpatient Medications on File Prior to Encounter   Medication Sig Dispense Refill    oxybutynin (DITROPAN) 5 MG tablet Take 2.5 mg by mouth 2 times daily      SITagliptin (JANUVIA) 50 MG tablet Take 50 mg by mouth daily      amantadine (SYMMETREL) 100 MG capsule Take 100 mg by mouth daily      carvedilol (COREG) 12.5 MG tablet Take 12.5 mg by mouth 2 times daily (with meals)      glipiZIDE (GLUCOTROL) 5 MG tablet Take 2.5 mg by mouth daily      pantoprazole (PROTONIX) 40 MG tablet Take 40 mg by mouth daily  furosemide (LASIX) 20 MG tablet Take 1 tablet by mouth daily 30 tablet 3    blood glucose monitor strips by Other route 4 times daily 400 strip 2    CPAP Machine MISC by Does not apply route New Auto CPAP 8-20 cm with [place] Paykel Simplus, Size L 1 each 0    Respiratory Therapy Supplies JOE New CPAP mask and supplies 1 Device 0    glucose blood VI test strips (EXACTECH TEST) strip 1 each by In Vitro route 4 times daily As needed. 100 each 11    pravastatin (PRAVACHOL) 80 MG tablet Take 80 mg by mouth daily. Allergies:  Patient has no known allergies.     Social History:    Social History     Socioeconomic History    Marital status:      Spouse name: Not on file    Number of children: Not on file    Years of education: Not on file    Highest education level: Not on file   Occupational History    Not on file   Social Needs    Financial resource strain: Not on file    Food insecurity:     Worry: Not on file     Inability: Not on file    Transportation needs:     Medical: Not on file     Non-medical: Not on file   Tobacco Use    Smoking status: Never Smoker    Smokeless tobacco: Never Used   Substance and Sexual Activity    Alcohol use: No     Alcohol/week: 0.0 oz    Drug use: No    Sexual activity: Not on file   Lifestyle    Physical activity:     Days per week: Not on file     Minutes per session: Not on file    Stress: Not on file   Relationships    Social connections:     Talks on phone: Not on file     Gets together: Not on file     Attends Oriental orthodox service: Not on file     Active member of club or organization: Not on file     Attends meetings of clubs or organizations: Not on file     Relationship status: Not on file    Intimate partner violence:     Fear of current or ex partner: Not on file     Emotionally abused: Not on file     Physically abused: Not on file     Forced sexual activity: Not on file   Other Topics Concern    Not on file   Social History Narrative  Not on file       Family History:   Family History   Problem Relation Age of Onset    Cancer Mother     Coronary Art Dis Father     Coronary Art Dis Brother     Hypertension Brother     High Cholesterol Brother     Diabetes Brother        Review of Systems:   Positives in bold  Constitutional: fever, chills, fatigue, malaise   HENT:  rhinorrhea, sinus pain, sore throat, epistaxis    Eyes:  photophobia, visual disturbance, eye redness  Respiratory: shortness of breath, cough, hemoptysis    Cardiovascular: chest pain, palpitations, orthopnea, leg swelling   Gastrointestinal: abdominal pain, nausea, vomiting, diarrhea, constipation   Endocrine: polydipsia, polyphagia, cold intolerance, heat intolerance  Genitourinary: dysuria, flank pain, frequency, urgency   Hematologic: easy bruising, easy bleeding  Musculoskeletal: back pain, neck pain, myalgias, arthalgias  Neurological: syncope, lightheadedness, dizziness, seizures, tremors, weakness  Psychiatric/Behavioral: anxiety, depression, hallucinations  Skin: rash, itching    Physical exam:   Constitutional:  VITALS:  BP (!) 101/54   Pulse 60   Temp 98.2 °F (36.8 °C) (Oral)   Resp 24   Ht 6' 2\" (1.88 m)   Wt 225 lb 12.8 oz (102.4 kg)   SpO2 98%   BMI 28.99 kg/m²     General: alert, in no apparent distress  HEENT: normocephalic, atraumatic, anicteric  Neck: supple, no mass  Lungs: non-labored respirations, clear to auscultation bilaterally  Heart: regular rate and rhythm, no murmurs or rubs  Abdomen: soft, non-tender, non-distended  MSK: no joint swelling or tenderness  : + hansen catheter, no CVA tenderness   Ext: no cyanosis, no peripheral edema  Neuro: alert and oriented, no gross abnormalities  Psych: normal mood and affect    Data/  CBC:   Lab Results   Component Value Date    WBC 16.7 06/04/2019    RBC 3.56 06/04/2019    HGB 10.8 06/04/2019    HCT 32.2 06/04/2019    MCV 90.7 06/04/2019    MCH 30.3 06/04/2019    MCHC 33.5 06/04/2019    RDW 17.3 06/04/2019     06/04/2019    MPV 12.3 02/09/2015     BMP:    Lab Results   Component Value Date     06/04/2019    K 4.6 06/04/2019    CL 93 06/04/2019    CO2 23 06/04/2019    BUN 45 06/04/2019    LABALBU 3.2 06/04/2019    CREATININE 2.87 06/04/2019    CALCIUM 8.8 06/04/2019    GFRAA 25.3 06/04/2019    LABGLOM 20.9 06/04/2019    GLUCOSE 140 06/04/2019    GLUCOSE 141 12/30/2011     Xr Chest Portable    Result Date: 6/4/2019  EXAMINATION: CHEST PORTABLE VIEW  CLINICAL HISTORY: Abdominal pain COMPARISONS: March 28, 2019  FINDINGS: Single  views of the chest is submitted. There is a left-sided ICD device. Leads overlying the cardiac silhouette. Unchanged. The cardiac silhouette is enlarged. Unchanged configuration. .  The mediastinum is unremarkable. Pulmonary vascular attenuated. Lungs are hyperinflated. . Right sided trachea. Areas atelectasis both bases. There is an area of infiltrate within the medial aspect of the right lower lobe. .  No Pneumothoraces. INFILTRATE WITHIN THE MEDIAL ASPECT OF THE RIGHT LOWER LOBE SUPERIMPOSED UPON COPD. .. Assessment:  80y.o. year old male with history s/f T2DM, HTN, CHF, A.fib, HTN and CAD who presented for weakness, decreased UOP    1. CHAYA on CKD stage III: possibly 2/2 volume depletion  2. CKD stage III: baseline Scr ~1.7-1.9 w/ eGFR mid/high 30s, risk factors include T2DM, HTN, CAD, CHF  3. Hyponatremia    Plan:  - agree w/ trial of fluids  - checking for microalbuminuria  - getting renal U/S   - checking urine indices  - agree w/ holding lasix    Thank you for the consultation. Will continue to follow  Please do not hesitate to call with questions.     Charly Doyle MD

## 2019-06-04 NOTE — PROGRESS NOTES
Physical Therapy   [facility] [id number][facility][id number]    NAME: Lisbet Loredo    : 1931 (80 y.o.)  MRN: [id number]    Account: [de-identified]  Gender: male    PT evaluation and treatment orders received. Chart reviewed. PT eval attempted. Patient politely declining. Reporting fatigue and difficulty staying awake for PLOF questions. Will attempt PT evaluation again at earliest convenience.       Electronically signed by Chavez Estrada PT on 19 at 2:02 PM

## 2019-06-04 NOTE — PROGRESS NOTES
Facility/Department: [facility] SURG UNIT   CLINICAL BEDSIDE SWALLOW EVALUATION    NAME: Esteban Yeboah  : 1931  MRN: [id number]    ADMISSION DATE: 2019  ADMITTING DIAGNOSIS: has Type II diabetes mellitus, uncontrolled ([address] CHF (congestive heart failure); BPH (benign prostatic hyperplasia); Hypercholesteremia; CKD (chronic kidney disease) stage 3, GFR 30-59 ml/min ([address] Dystrophy of anterior cornea; Acute sinusitis; Basal cell carcinoma of nose; Retained foreign body of eyelid; Tear film insufficiency; Hypertension; Atrial fibrillation ([address] Anticoagulant long-term use; Cardiomyopathy ([address] AICD (automatic cardioverter/defibrillator) present; DCM (dilated cardiomyopathy) ([address] Coronary artery disease involving native coronary artery of native heart with angina pectoris ([address] CHAYA (acute kidney injury) ([address] UTI (urinary tract infection); Anticoagulated; Aphasia; Bacteriuria; Encephalopathy; Left-sided Bell's palsy; Leukocytosis; Subdural hematoma ([address] Type 2 diabetes mellitus with hyperglycemia ([address] Implantable cardioverter-defibrillator (ICD) in situ; Dilated cardiomyopathy ([address] Congestive heart failure ([address] Chronic renal insufficiency, stage III (moderate) ([address] Coronary arteriosclerosis in native artery; Corneal epithelial dystrophy; Hypercholesterolemia; Tear film insufficiency, unspecified; Type 2 diabetes mellitus ([address] Acute kidney injury ([address] Long term current use of anticoagulant therapy; Atrial fibrillation ([address] Benign prostatic hyperplasia; and Sepsis due to urinary tract infection ([address] on their problem list.    ONSET DATE: 19    Date of Eval: 2019  Evaluating Therapist: Freedom Roy    Recommended Diet and Intervention  Diet Solids Recommendation: Regular  Liquid Consistency Recommendation: Thin        Therapeutic Interventions: Patient/Family education    Compensatory Swallowing Strategies  Compensatory Swallowing Strategies:  Alternate solids and liquids;Small bites/sips;Eat/Feed slowly;Upright as possible for all oral intake    Reason for Referral  Mare Collazo was referred for a bedside swallow evaluation to assess the efficiency of his swallow function, identify signs and symptoms of aspiration and make recommendations regarding safe dietary consistencies, effective compensatory strategies, and safe eating environment. General  Chart Reviewed: Yes  Behavior/Cognition: Alert; Cooperative  Temperature Spikes Noted: No  Respiratory Status: Room air  Breath Sounds: Clear  O2 Device: None (Room air)  Communication Observation: Functional  Follows Directions: Simple  Dentition: Some missing teeth  Patient Positioning: Upright in bed  Baseline Vocal Quality: Normal  Prior Dysphagia History: Per family patient was seen by [facility] and cleared for a regular diet  Consistencies Administered: Reg solid;Puree; Thin - teaspoon; Thin - cup    Current Diet level:  Current Diet : Regular  Current Liquid Diet : Thin    Oral Motor Deficits  Oral/Motor  Oral Motor: Exceptions to [facility]  Labial ROM: Reduced right  Labial Symmetry: Abnormal symmetry right(mild right facial droop that patient reported is baseline from [place] [facility])    Oral Phase Dysfunction  Oral Phase  Oral Phase: Exceptions  Oral Phase Dysfunction  Impaired Mastication: Reg Solid(mild increased mastication time )  Oral Phase  Oral Phase - Comment: Pt p/w mild oral dysphagia characterized by mild increased mastication time and cleared all bolus from oral cavity     Indicators of Pharyngeal Phase Dysfunction   Pharyngeal Phase  Pharyngeal Phase: WNL  Pharyngeal Phase   Pharyngeal: pharyngeal phase judged to be WNL    Impression  Dysphagia Diagnosis: Mild oral stage dysphagia  Dysphagia Outcome Severity Scale: Level 6: Within functional limits/Modified independence     Treatment Plan  Requires SLP Intervention: No  Duration/Frequency of Treatment: N/A          Treatment/Goals  Short-term

## 2019-06-05 NOTE — PLAN OF CARE
Nutrition Problem: Inadequate oral intake  Intervention: Food and/or Nutrient Delivery: Modify current diet(Carb Control 5 diet.   Start frozen ONS BID)  Nutritional Goals: po intake > 75% of meals and supplements

## 2019-06-05 NOTE — CONSULTS
[facility] ATTENDING INPATIENT  CONSULTATION NOTE                                                                                                                                                                                                Reason for Consult  Urosepsis    History of Present Illness  Copy of previous visit with  Dr. Lynda Mcclain listed below    This is an 81 yo white male with h/o HTN, Pacemaker, CKD, Stones, DM, BPH w/LUTs on Flomax and Proscar and bilateral renal cysts, prior negative microhematuria evaluation in 2009 (cysto neg) .Angelito Bianchi seen on 3/22/18, he has no interval dysuria or UTI's. He has no hematuria or abd/flank  pain. He has a good flow and no frequency or urgency. He feels the bladder empties. He has no PVD. He has no SE from his BPH medications. he had recent Mohs surgery for a basal cell cancer of the forehead. He has a prior atypical LT upper pole renal lesion (1.2 cm size) that had been stable from a CT in 2009 that is no longer being followed. He has CKD and a pacemaker making it difficult for IV contrast or an MRI. He has no other new medical or surgical problems.     Currently patient is being evaluated for urosepsis  Urine culture and blood cultures are positive  WBC elevated  Patient currently has a Huynh catheter that seems to be functioning well  Ultrasound revealed questionable hydronephrosis on the right will obtain CT    Urologic Review of Systems/Symptoms  Other Urologic: History of BPH and obstructive voiding symptoms    Review of Systems  Head and neck: No issues/reviewed  Cardiac: No recent issues/reviewed  Pulmonary: No issues/reviewed  Gastrointestinal: No issues/reviewed  Neurologic: No recent issues/reviewed  Extremities: No issues/reviewed  Lymphatics: No lymphadenopathy no change  Genitourinary: See above  Skin: No issues/reviewed  Hospitalization: Recent admission to [facility] clinic rehab  All 14 categories of Review of Systems otherwise reviewed no other findings reported.     Past Medical History:   Diagnosis Date    Anticoagulant long-term use     xarelto    Atrial fibrillation (HCC)     Bell's palsy     BPH (benign prostatic hyperplasia)     CAD (coronary artery disease)     Cardiomyopathy (HCC)     Chronic kidney disease     Hyperlipidemia     Hypertension     Osteoarthritis     Type II or unspecified type diabetes mellitus without mention of complication, not stated as uncontrolled      Past Surgical History:   Procedure Laterality Date    CORONARY ANGIOPLASTY      DIAGNOSTIC CARDIAC CATH LAB PROCEDURE      EYE SURGERY Bilateral 03/2018    PACEMAKER INSERTION N/A 1994    SKIN CANCER EXCISION  2016    SKIN CANCER EXCISION       Social History     Socioeconomic History    Marital status:      Spouse name: None    Number of children: None    Years of education: None    Highest education level: None   Occupational History    None   Social Needs    Financial resource strain: None    Food insecurity:     Worry: None     Inability: None    Transportation needs:     Medical: None     Non-medical: None   Tobacco Use    Smoking status: Never Smoker    Smokeless tobacco: Never Used   Substance and Sexual Activity    Alcohol use: No     Alcohol/week: 0.0 oz    Drug use: No    Sexual activity: None   Lifestyle    Physical activity:     Days per week: None     Minutes per session: None    Stress: None   Relationships    Social connections:     Talks on phone: None     Gets together: None     Attends Confucianism service: None     Active member of club or organization: None     Attends meetings of clubs or organizations: None     Relationship status: None    Intimate partner violence:     Fear of current or ex partner: None     Emotionally abused: None     Physically abused: None     Forced sexual activity: None   Other Topics Concern    None   Social History Narrative    None     Family History   Problem Relation Age of Onset    Nightly Gretel Kelly MD        glucose (GLUTOSE) 40 % oral gel 15 g  15 g Oral PRN Gretel Kelly MD        dextrose 50 % IV solution  12.5 g Intravenous PRN Iva Ames MD        glucagon (rDNA) injection 1 mg  1 mg Intramuscular PRN Gretel Kelly MD        dextrose 5 % solution  100 mL/hr Intravenous PRN Gretel Kelly MD        insulin lispro (HUMALOG) injection vial 0-6 Units  0-6 Units Subcutaneous TID WC Gretel Kelly MD   1 Units at 06/05/19 1243    insulin lispro (HUMALOG) injection vial 0-3 Units  0-3 Units Subcutaneous Nightly Iva Ames MD   1 Units at 06/04/19 2153     Patient has no known allergies. All reviewed and verified by Dr Crystal Elmore on today's visit    PSA   Date Value Ref Range Status   03/19/2019 0.17 0.00 - 6.22 ng/mL Final   03/21/2018 0.17 0.00 - 6.22 ng/mL Final   03/14/2017 0.15 0.00 - 6.22 ng/mL Final   03/11/2016 0.14 0.00 - 6.22 ng/mL Final   03/13/2015 0.17 0.00 - 6.22 ng/mL Final     Diagnostic Psa   Date Value Ref Range Status   02/28/2014 0.12 0.00 - 6.22 ng/mL Final     [unfilled]    Physical Exam  Vitals:    06/04/19 2015 06/04/19 2030 06/05/19 0720 06/05/19 1047   BP: (!) 109/59  121/69    Pulse: 62  59    Resp:  24 18    Temp: 97.9 °F (36.6 °C)  98.2 °F (36.8 °C) 98.1 °F (36.7 °C)   TempSrc:   Oral    SpO2: 93%  97%    Weight:       Height:         Constitutional: patient is oriented to person, place, and time. patient appears well-developed. not in distress here with family. Ears: Adequate hearing  Head: Normocephalic. Atraumatic  Neck: Normal range of motion. No lymphadenopathy  Cardiovascular: Normal rate, BP reviewed. irregular rhythm  Pulmonary/Chest: Normal respiratory effort  no shortness of breath  Abdominal: Denies abdominal discomfort  Urologic Exam  Denies flank pain. Huynh catheter draining clear urine. Musculoskeletal:  Moves all extremities.   Extremities: No edema   Neurological: No deficits noted  Skin: Skin is warm and dry. no rashes  Lymphatics: No lymphadenopathy   Psychiatric: patient has a normal mood and affect. patient's behavior is normal.  Appropriate in conversation. Assessment  Urosepsis  Question of obstruction on ultrasound  Plan  Renal colic CT  Will follow with you  Greater 50% of 50 minutes spent evaluating patient face to face. Janie Rdz MD FACS    Please note this report has been partially produced using speech recognition software  And may cause contain errors related to that system including grammar, punctuation and spelling as well as words and phrases that may seem inappropriate. If there are questions or concerns please feel free to contact me to clarify.

## 2019-06-05 NOTE — PROGRESS NOTES
Hospitalist Progress Note  6/5/2019 8:07 AM    Assessment and Plan:   1. Gram negative Septicemia ( WBC> 12 RR> 20) with CHAYA due to UTI with Pyuria and microscopic hematuria, present on admission: Excalating to IV cefepimeConsulting ID for Gram negative Bacteremia. Awaiting cultures and sensitivities. Repeating Blood cultures today. IVFs, Repeat CBC in AM. Improving leukocytosis  2. Dehydration with Hyponatremia and Hypotension due to Hypovolemia: IVF, Nephro on board. Repeat Labs, strict intake output. Daily weights, hold Lasix. Hold BP lowering agentsHolding BP lowering agents  3. CHAYA on CKD III likely due to post obstructive and ATN (Baseline Cr 1.41 was 2.87) present on admission:  Nephro on board. Avoid nephrotoxic agents wherever possible. Gentle IVF. Repeat BMP in AM. Strict Intake output. Avoiding fluctuations in BP. 4. DMII with hyperglycemia: ISS, hypoglycemia protocol POCT Glucose TIDAC & QHS  5. A fib: rate controlled. Goal K and Mg 4.0 and 2.0, respectively. 6. CAD : Resume home meds. 7.  Hx of ICH with residual deficits: Neurochecks, monitor for changes in neuro exam. Currently at baseline. Reorient PRN. Fall precautions aspiration precautions. \  8. Urinary retention likely worsened with Oxybutynin in addition to chronic urinary retention due to BPH should have been on Flomax which was prescribed by Urology in the past not sure why this was [facility]. Discontinued Oxybutynin. Consulting Urology. Huynh in place, Holding flomax to avoid orthostatic hypotension. Needs to work with PT OT, will defer meds to Urology. Poor Urinary output, Likely has ATN Will c  1. Bowel Regimen and GI PPx: stool softners PRN ordered with hold parameters for loose stools or diarrhea. On antiacid  2. Diet: DIET CARB CONTROL;  3. Advance Directive: Full Code   4. DVT prophylaxis: Chemical prophylaxis SQ  5. Discharge planning: [place] on board. Will discharge once medically stable and cleared by all consultants.    6. High 80 mg Oral Nightly    cefTRIAXone (ROCEPHIN) IV  1 g Intravenous Q24H    insulin lispro  0-6 Units Subcutaneous TID WC    insulin lispro  0-3 Units Subcutaneous Nightly       LABS Reviewed    IMAGING Reviewed    Jeffery Sales MD  [facility] Hospitalist

## 2019-06-05 NOTE — CARE COORDINATION
Met with wife at bedside. She reports that pt has [facility] at home (PT/OT/Speech/Nursing) and uses a rollator. No other DME. They have the first floor set up for him and plan for him to return to home and resume [place] [id number]  if possible. He was recently at [facility]. PT/OT evals and MBS pending. Lino is new for pt. Will follow for POC.

## 2019-06-05 NOTE — CONSULTS
Inpatient consult to Infectious Diseases  Consult performed by: Luanne Romberg, DO  Consult ordered by: Yolanda Matos MD Odis Shaker  8/29/1931  male  Medical Record Number: [id number]    Patient informed that I am an Infectious Disease physician and permission obtained from the patient to speak in front of any visitors prior to any discussion for HIPPA purposes. HPI: Patient with GNR bacteremia , decreased urine output, increasing weakness. + nausea  Hx of CHF, AF, T2DM, HTN, CAD    Subjectively, no new complaints at this time.      Review of Systems: All 14 review of systems were discussed with the patient and are negative other than as stated above      Past Medical History:   Diagnosis Date    Anticoagulant long-term use     xarelto    Atrial fibrillation (HCC)     Bell's palsy     BPH (benign prostatic hyperplasia)     CAD (coronary artery disease)     Cardiomyopathy (HCC)     Chronic kidney disease     Hyperlipidemia     Hypertension     Osteoarthritis     Type II or unspecified type diabetes mellitus without mention of complication, not stated as uncontrolled        Past Surgical History:   Procedure Laterality Date    CORONARY ANGIOPLASTY      DIAGNOSTIC CARDIAC CATH LAB PROCEDURE      EYE SURGERY Bilateral 03/2018    PACEMAKER INSERTION N/A 1994    SKIN CANCER EXCISION  2016    SKIN CANCER EXCISION         Social History     Socioeconomic History    Marital status:      Spouse name: Not on file    Number of children: Not on file    Years of education: Not on file    Highest education level: Not on file   Occupational History    Not on file   Social Needs    Financial resource strain: Not on file    Food insecurity:     Worry: Not on file     Inability: Not on file    Transportation needs:     Medical: Not on file     Non-medical: Not on file   Tobacco Use    Smoking status: Never Smoker    Smokeless tobacco: Never Used   Substance and Sexual Activity    Alcohol use: No     Alcohol/week: 0.0 oz    Drug use: No    Sexual activity: Not on file   Lifestyle    Physical activity:     Days per week: Not on file     Minutes per session: Not on file    Stress: Not on file   Relationships    Social connections:     Talks on phone: Not on file     Gets together: Not on file     Attends Voodoo service: Not on file     Active member of club or organization: Not on file     Attends meetings of clubs or organizations: Not on file     Relationship status: Not on file    Intimate partner violence:     Fear of current or ex partner: Not on file     Emotionally abused: Not on file     Physically abused: Not on file     Forced sexual activity: Not on file   Other Topics Concern    Not on file   Social History Narrative    Not on file       Family History   Problem Relation Age of Onset    Cancer Mother     Coronary Art Dis Father     Coronary Art Dis Brother     Hypertension Brother     High Cholesterol Brother     Diabetes Brother        No current facility-administered medications on file prior to encounter.       Current Outpatient Medications on File Prior to Encounter   Medication Sig Dispense Refill    SITagliptin (JANUVIA) 50 MG tablet Take 50 mg by mouth daily      amantadine (SYMMETREL) 100 MG capsule Take 100 mg by mouth daily      carvedilol (COREG) 12.5 MG tablet Take 12.5 mg by mouth 2 times daily (with meals)      glipiZIDE (GLUCOTROL) 5 MG tablet Take 2.5 mg by mouth daily      pantoprazole (PROTONIX) 40 MG tablet Take 40 mg by mouth daily      furosemide (LASIX) 20 MG tablet Take 1 tablet by mouth daily 30 tablet 3    blood glucose monitor strips by Other route 4 times daily 400 strip 2    CPAP Machine MISC by Does not apply route New Auto CPAP 8-20 cm with Meebler Simplus, Size L 1 each 0    Respiratory Therapy Supplies JOE New CPAP mask and supplies 1 Device 0    glucose blood VI test strips (EXACTECH TEST) strip 1 each by In Vitro route 4 times daily As needed. 100 each 11    pravastatin (PRAVACHOL) 80 MG tablet Take 80 mg by mouth daily. Physical Exam:    General: Patient appears in no acute distress, cooperative, obese. Skin: no new rashes or erythema or induration  HEENT: EOMI, MMM, Neck is supple  Heart: S1 S2  Lungs: bilaterally clear to auscultation  Abdomen: soft, ND, NTTP, +BS  Extrem:+pulses, no calf pain,   Neuro exam: CN II-XII intact, facial expressions symmertrical bilaterally, no slurred speech      Labs: I have reviewed all lab results by electronic record, including most recent CBC, metabolic panel, and pertinent abnormalities were addressed from an infectious disease perspective. WBC trends are being monitored. Lab Results   Component Value Date     06/05/2019    K 4.2 06/05/2019    CL 96 06/05/2019    CO2 20 06/05/2019    BUN 57 06/05/2019    CREATININE 3.66 06/05/2019    GLUCOSE 115 06/05/2019    GLUCOSE 141 12/30/2011    CALCIUM 8.2 06/05/2019      Lab Results   Component Value Date    WBC 14.0 (H) 06/05/2019    HGB 9.8 (L) 06/05/2019    HCT 28.8 (L) 06/05/2019    MCV 89.0 06/05/2019     06/05/2019       Radiology:   I have reviewed imaging results per electronic record and most pertinent abnormalities are being addressed from an infectious disease standpoint. Assessment:  GNR sepsis with bacteremia secondary to UTI ( GNR )   Pyuria and microscopic hematuria  Dehydration and CHAYA on CKD 3  DM2 - uncontrolled  Urinary retention/ hx of BPH      Plan:  Optimal glycemic control  Supportive care  Started on Cefepime pending final species and sensitivities of GNR in [facility]. Repeat BC x 2  Direct therapy.          Kanika Katz D.O.

## 2019-06-05 NOTE — PROCEDURES
SPEECH/LANGUAGE PATHOLOGY  VIDEOFLUOROSCOPIC STUDY OF SWALLOWING (MBS)      PATIENT NAME:  Ramirez Galol      :  1931    [id number]   TODAY'S DATE:  2019    Time in: 5304  Time out:1430    [x]The admitting diagnosis and active problem list, as listed below have been reviewed prior to initiation of this evaluation.      ADMITTING DIAGNOSIS: UTI (urinary tract infection) [N39.0]     ACTIVE PROBLEM LIST:   Patient Active Problem List   Diagnosis    Type II diabetes mellitus, uncontrolled ([address]    CHF (congestive heart failure)    BPH (benign prostatic hyperplasia)    Hypercholesteremia    CKD (chronic kidney disease) stage 3, GFR 30-59 ml/min (HCC)    Dystrophy of anterior cornea    Acute sinusitis    Basal cell carcinoma of nose    Retained foreign body of eyelid    Tear film insufficiency    Hypertension    Atrial fibrillation (HCC)    Anticoagulant long-term use    Cardiomyopathy ([address]    AICD (automatic cardioverter/defibrillator) present    DCM (dilated cardiomyopathy) ([address]    Coronary artery disease involving native coronary artery of native heart with angina pectoris ([address]    CHAYA (acute kidney injury) ([address]    UTI (urinary tract infection)    Anticoagulated    Aphasia    Bacteriuria    Encephalopathy    Left-sided Bell's palsy    Leukocytosis    Subdural hematoma (HCC)    Type 2 diabetes mellitus with hyperglycemia (HCC)    Implantable cardioverter-defibrillator (ICD) in situ    Dilated cardiomyopathy (HCC)    Congestive heart failure (HCC)    Chronic renal insufficiency, stage III (moderate) (HCC)    Coronary arteriosclerosis in native artery    Corneal epithelial dystrophy    Hypercholesterolemia    Tear film insufficiency, unspecified    Type 2 diabetes mellitus ([address]    Acute kidney injury ([address]    Long term current use of anticoagulant therapy    Atrial fibrillation (HCC)    Benign prostatic hyperplasia    Sepsis due to urinary tract infection ([facility]       No Known Allergies    SUMMARY OF EVALUATION  DYSPHAGIA DIAGNOSIS:  Mild oral phase dysphagia and moderate pharyngeal phase dysphagia    DIET RECOMMENDATIONS: regular consistencies with thin liquids  ST suggests GI consult       FEEDING RECOMMENDATIONS:   [] No assistance required   [] Frequent checks by nursing  [] Full assistance required  [] Set up required   [x] Supervision with all PO intake       [] [employer]     [] Double swallow    [] Chin tuck  [x] Multiple swallow     [x] Feed in upright position   [x] Small bites/sips   [] Remain upright after meals  [] Alternate solids / liquids  [] Check for oral pocketing  [x] No straw    [] Throat clear       [] Place food on left side  [] Place food on right side  [] Spoon sip liquids   [] Effortful swallow  [x]  Ensure patient has swallowed prior to offerering another bite/sip     [x] Administer meds ([x] whole   [] crushed) with: [] Water [x] Applesauce  [] Administer meds via feeding tube    THERAPY RECOMMENDATIONS:   []  Therapy is not recommended     [x]  Therapy is recommended to:      [x]  Assess tolerance of recommended diet   []  Improve oral motor strength and range of motion    [x]  Improve tongue base retraction     [x]  Improve laryngeal strength and range of motion      [x]  Address cricopharyngeal dysfunction (Shaker Exercises)                               [x]  Mealtime assessment of patient's tolerance of prescribed diet     [x] Opal Salinas RN notified     []Therapy at the discretion of facility/treating Speech Pathologist     []  Eval faxed to [facility]omasa/Facility    [] Malnutrition indicators have been identified and nursing has been notified to ensure a dietary consult is ordered. [x]Aspiration was not observed on this MBS however patient is at high risk for aspiration due to the degree of pharyngeal deficits noted below.   If the patient is demonstrating increased pneumonia or compromise to their respiratory function they may benefit from being made NPO. OBJECTIVE ASSESSMENT    Oral mechanism examination:    []  Adequate lingual/labial strength  []  Generalized oral weakness  [x]  Right labiobuccal weakness   []  Left labiobuccal weakness    [x]  Lingual tremors     []  Left lingual deviation   []  Decreased velar elevation              []  Decreased lingual coordination               []  Gag response present   []  Gag response absent     []  Volitional swallow    []  Volitional cough   []  Oral apraxia               []  CNT    Dentition: [] Natural  [x]  Missing/teeth in poor repair  []  Edentulous  []  Dentures   []  Other    Current respiratory status:    [x] Room Air   [] Nasal cannula [] O2 mask           []  Non-rebreather mask  []  Tracheostomy  []  Vent dependent    Vocal quality prior to PO intake:  [x]  WFL  []  Weak  []  Wet    Cognitive status:    [x]  Alert    []  Lethargic  [x] Functional for this test  [] Confused  [] Aphasic   [] Dysarthric   [] Impulsive   [] Cognitive Deficits                 DIET LEVEL PRIOR TO THIS EVALUATION/PRIOR LEVEL OF FUNCTION :  DIET CARB CONTROL;    PROCEDURE   Patient positioning: Seated 70-90°  Viewing Plane(s): LATERAL ONLY  Contrast: commercially prepared, non-standardized barium viscosities; graduated from thin liquid to pudding consistency. Administered via tsp (5 ml boluses), by cup or straw in single and sequentially swallowed boluses as tolerated. Solid evaluated with 1/2 marely cracker/3 ml barium pudding coated as tolerated.      Consistencies Administered During the Evaluation   Liquids:     [x]Thin      [x]Nectar        []Honey   Solids:        [x]Pureed/Pudding           [x]Soft Solid/mixed fruit       [x]Cookie  Other:       Method of Intake:     [x]Self Fed       []Fed by Clinician     [x]Cup      []Spoon     []Straw                 RESULTS   ORAL STAGE: []WFL  [x]  Exceptions    [] Inadequate labial seal resulting anterior labial spillage from: []right [] left []midline     [] Decreased mastication due to: []poor/missing dentition []decreased lingual control []cognitive status    [] Delayed A-P transit due to: []decreased initiation [] reduced lingual strength []cognitive status     [] Decreased bolus formation resulting in premature pharyngeal spillage to the level of the: [] valleculae [] pyriforms      [x] Oral residuals: []anterior sulcus  []sub lingually  []right buccal cavity     []left buccal cavity [x]on tongue base      []throughout oral cavity []on superior tongue  [x]on palate   [x]on velum        Oral Stage Impression:  Mild oral phase dysphagia characterized by decreased tongue base retraction to posterior pharyngeal wall resulting in premature loss of bolus and pharyngeal residuals s/p swallow.        PHARYNGEAL STAGE:     []WFL  [x]  Exceptions    ONSET TIME:  [] Onset time of the pharyngeal swallow was adequate    [x] Delayed initiation of the pharyngeal swallow:    [x]mild []moderate  []severe []absent     [x] Swallow reflex was triggered at:   [x]tongue base (nectar and solid)  [x]valleculae (nectar and thin) [x]pyriform sinus (puree and mixed consistency)    PHARYNGEAL RESIDUALS        Vallecula/Pharyngeal Wall  []No significant residuals were noted in the vallecula    [x]Reduced tongue base retraction resulting in:    [x]residuals in vallecula [x]residual on posterior pharyngeal wall    These residuals were noted for: [x]thin [x]nectar [x]pureed [x]solid  Which: []cleared  []did not clear  [x]partially cleared []mostly cleared  With:    [x]cued []spontaneous []double swallow [x]multiple swallow ( 3 times)  []liquid chaser    [x]Residuals in the vallecula were noted due to inadequate epiglottic inversion      Pyriform Sinuses  []No significant residuals were noted in the pyriform sinuses    [x] Residuals in the pyriform sinuses were noted due to:    [x]pharyngeal weakness [x]cricopharyngeal dysfunction    These residuals were noted for: [x] thin [x] solids/liquids     [] chin tuck     [x] cued throat clear     [] cued redirective cough    Ineffective compensatory strategies:     [] double swallow        [] multiple swallow     [x] chin tuck - resulted in penetration      [] alternating solids/liquids     [] cued throat clear     [] cued redirective cough    Pharyngeal Stage Impression:  Moderate pharyngeal phase dysphagia characterized by decreased laryngeal excursion and pharyngeal contraction resulting in poor epiglottic inversion and moderate-maximum scattered pharyngeal residuals s/p swallow. Decreased UES opening resulting in maximum residuals on UES. Pt was able to partially clear residuals with 3 dry swallows. Rec: GI consult to more objectively assess esophageal phase of swallow. CERVICAL ESOPHAGEAL STAGE : []WFL   [x]Impaired   []Could not assess     [x]Reduced cricopharyngeal opening    []Decreased esophageal peristalsis    [x] Esophageal backflow into the upper esophagus    [] Esophageal backflow into the pharynx    [] Esophageal backflow into the larynx with penetration     [] Esophgeal backflow into the larynx with aspiration    []  Reported presence of Zenker's Diverticulum by Radiologist         Long Term Goals:  [x] Will tolerate least restrictive diet safely      []Goals to be determined after MBS      [] No Treatment indicated at this time      Short Term Goals:   Pt to be seen  2-3 x/week for LOS or until goals met    Oral Phase:  [x]  Pt will complete lingual exercises that promote anterior to posterior propulsion of bolus and improve tongue base retraction with 80% accuracy, given cues as needed, in order to strengthen the muscles of the swallow to decrease risk of aspiration and to increase ability to safely handle the least restrictive diet level.     Pharyngeal Phase:   [x] Pt will improve hyolaryngeal elevation by performing hyolaryngeal exercises (Mendelson, Shaker, Falsetto, etc) with 80% accuracy, given cues as needed, in order

## 2019-06-05 NOTE — PROGRESS NOTES
Raghav hinds served Dr. Maverick Eagle with Blood culture result at shift change. 2/2 positive for gram negative rods.

## 2019-06-05 NOTE — PROGRESS NOTES
Patient assessment is completed. Vitals are stable. AOX2. He knows his name and that he is in the hospital. He thinks he is in [address] for not being able to have a BM. Does not know the year. Lungs clear bilaterally. S1S2. Resting comfortably in bed.  Will continue to monitor  Electronically signed by Severo Smith RN on 6/5/2019 at 5:04 AM

## 2019-06-05 NOTE — PROGRESS NOTES
[facility] Speech Therapy  Date: 2019  Patient Name: Carolina Oswald        MRN: [id number]  Account: [de-identified]   : 1931  (80 y.o.)  [id number]    Repeat clinical bedside swallow evaluation received this date. Chart reviewed, pt. has not had a change in medical status at this time. Clinical bedside swallow evaluation was completed 2019 with a diet recommendation of regular with thin and no f/u. Discussed with Larissa Officer RN that if there is a concern for aspiration, a modified barium swallow can be done this date, per physician approval.        Electronically signed by PORTIA De Santiago on 2019 at 9:19 AM

## 2019-06-05 NOTE — PROGRESS NOTES
Nephrology Progress Note    Assessment:  80y.o. year old male with history s/f T2DM, HTN, CHF, A.fib, HTN and CAD who presented for weakness, decreased UOP     1. CHAYA on CKD stage III: possibly 2/2 LT sided hydronephrosis causing obstructive nephropathy (stone related vs. BPH)  2. CKD stage III: baseline Scr ~1.7-1.9 w/ eGFR mid/high 30s, risk factors include T2DM, HTN, CAD, CHF, ?obstructive nephropathy (has several renal stones)   3. Hyponatremia  4. Gram negative bacteremia     Plan:  - awaiting urology recommendations re: hydronephrosis  - ok to give fluids, would do so with caution however  - continue to hold lasix     Thank you for the consultation. Will continue to follow  Please do not hesitate to call with questions.       Patient Active Problem List:     Type II diabetes mellitus, uncontrolled (HCC)     CHF (congestive heart failure)     BPH (benign prostatic hyperplasia)     Hypercholesteremia     CKD (chronic kidney disease) stage 3, GFR 30-59 ml/min (HCC)     Dystrophy of anterior cornea     Acute sinusitis     Basal cell carcinoma of nose     Retained foreign body of eyelid     Tear film insufficiency     Hypertension     Atrial fibrillation (HCC)     Anticoagulant long-term use     Cardiomyopathy ([address]     AICD (automatic cardioverter/defibrillator) present     DCM (dilated cardiomyopathy) ([address]     Coronary artery disease involving native coronary artery of native heart with angina pectoris ([address]     CHAYA (acute kidney injury) ([address]     UTI (urinary tract infection)     Anticoagulated     Aphasia     Bacteriuria     Encephalopathy     Left-sided Bell's palsy     Leukocytosis     Subdural hematoma (HCC)     Type 2 diabetes mellitus with hyperglycemia (HCC)     Implantable cardioverter-defibrillator (ICD) in situ     Dilated cardiomyopathy (HCC)     Congestive heart failure (HCC)     Chronic renal insufficiency, stage III (moderate) ([facility])     Coronary arteriosclerosis in native artery     Corneal epithelial dystrophy     Hypercholesterolemia     Tear film insufficiency, unspecified     Type 2 diabetes mellitus ([facility]     Ac[facility] kidney [facility] ([facility]     Long term current use of anticoagulant therapy     Atrial fibrillation (HCC)     Benign prostatic hyperplasia     Sepsis due to urinary tract infection ([facility]      Subjective:  Admit Date: 6/4/2019    Interval History: renal U/S showed LT sided hydronephrosis, Scr trending up, got some fluids yesterday with not much improvement, BCx growing GNR    Medications:  Scheduled Meds:   cefepime  1 g Intravenous Q12H    polyethylene glycol  17 g Oral Daily    sennosides-docusate sodium  1 tablet Oral Daily    lactobacillus acidophilus  4 tablet Oral TID    b complex-C-folic acid  1 capsule Oral Daily    IVPB builder   Intravenous Once    amantadine  100 mg Oral Daily    pantoprazole  40 mg Oral Daily    sodium chloride flush  10 mL Intravenous 2 times per day    heparin (porcine)  5,000 Units Subcutaneous 3 times per day    pravastatin  80 mg Oral Nightly    insulin lispro  0-6 Units Subcutaneous TID WC    insulin lispro  0-3 Units Subcutaneous Nightly     Continuous Infusions:   sodium chloride      dextrose         CBC:   Recent Labs     06/04/19 0230 06/05/19  0721   WBC 16.7* 14.0*   HGB 10.8* 9.8*    167     CMP:    Recent Labs     06/04/19 0230 06/05/19  0721   * 132*   K 4.6 4.2   CL 93* 96   CO2 23 20   BUN 45* 57*   CREATININE 2.87* 3.66*   GLUCOSE 140* 115*   CALCIUM 8.8 8.2*   LABGLOM 20.9* 15.8*     Troponin: No results for input(s): TROPONINI in the last 72 hours. BNP: No results for input(s): BNP in the last 72 hours. INR: No results for input(s): INR in the last 72 hours. Lipids: No results for input(s): CHOL, LDLDIRECT, TRIG, HDL, AMYLASE, LIPASE in the last 72 hours.   Liver:   Recent Labs     06/04/19 0230   AST 18   ALT 25   ALKPHOS 107*   PROT 6.9   LABALBU 3.2*   BILITOT 1.0*     Iron:  No results for input(s): IRONS, FERRITIN in the last 72 hours. Invalid input(s): LABIRONS  Urinalysis: No results for input(s): UA in the last 72 hours.     Objective:  Vitals: /69   Pulse 59   Temp 98.2 °F (36.8 °C) (Oral)   Resp 18   Ht 6' 2\" (1.88 m)   Wt 225 lb 12.8 oz (102.4 kg)   SpO2 97%   BMI 28.99 kg/m²    Wt Readings from Last 3 Encounters:   06/04/19 225 lb 12.8 oz (102.4 kg)   05/28/19 218 lb (98.9 kg)   05/22/19 225 lb (102.1 kg)      24HR INTAKE/OUTPUT:      Intake/Output Summary (Last 24 hours) at 6/5/2019 1008  Last data filed at 6/5/2019 0644  Gross per 24 hour   Intake 881 ml   Output 725 ml   Net 156 ml     General: alert, in no apparent distress  HEENT: normocephalic, atraumatic, anicteric  Neck: supple, no mass  Lungs: non-labored respirations, clear to auscultation bilaterally  Heart: regular rate and rhythm, no murmurs or rubs  Abdomen: soft, non-tender, non-distended  MSK: no joint swelling or tenderness  : + hansen catheter, no CVA tenderness   Ext: no cyanosis, no peripheral edema  Neuro: alert and oriented, no gross abnormalities  Psych: normal mood and affect          Electronically signed by John Finney MD

## 2019-06-05 NOTE — PROGRESS NOTES
Nutrition Assessment    Type and Reason for Visit: Initial, Consult(RN consult for poor po intake)    Nutrition Recommendations: Carb Control 5 diet. Start frozen ONS BID    Nutrition Assessment: UTD nutrition status pta. At risk for nutrition compromise due to reported diminished po intake per nursing. Will start a nutrition supplement    Malnutrition Assessment:  · Malnutrition Status: Insufficient data  · Context: Acute illness or injury  · Findings of the 6 clinical characteristics of malnutrition (Minimum of 2 out of 6 clinical characteristics is required to make the diagnosis of moderate or severe Protein Calorie Malnutrition based on AND/ASPEN Guidelines):  1. Energy Intake-Unable to assess, Unable to assess    2. Weight Loss-Unable to assess, unable to assess  3. Fat Loss-Unable to assess,    4. Muscle Loss-Unable to assess,    5. Fluid Accumulation-No significant fluid accumulation,    6.  Strength-Not measured    Nutrition Risk Level: High    Nutrient Needs:  · Estimated Daily Total Kcal: 6102-0388 (kg 20-22)  · Estimated Daily Protein (g): 111-120 (kg IBW x 1.3-1.4)  · Estimated Daily Total Fluid (ml/day): ~2000 ml (1 ml/kcal)    Nutrition Diagnosis:   · Problem: Inadequate oral intake  · Etiology: related to (current condition)     Signs and symptoms:  as evidenced by Intake 0-25%    Objective Information:  · Nutrition-Focused Physical Findings: peripheral line. IVF NaCl @ 125 ml/hr labs: gluc 115, Na+ 132, elevated BUN/Cr.  No edema noted meds: lasix, PMH: DM2, CKD, CHF, BM pta  · Wound Type: None  · Current Nutrition Therapies:  · Oral Diet Orders: ('Carb Control')   · Oral Diet intake: 1-25%, 0%  · Oral Nutrition Supplement (ONS) Orders: None  · Anthropometric Measures:  · Ht: 6' 2\" (188 cm)   · Current Body Wt: (6/5)  · Admission Body Wt: 225 lb (102.1 kg)(bedscale, 215 lb stated)  · Usual Body Wt: 230 lb (104.3 kg)(variable weight ranges in EMR)  · % Weight Change:  ,  UTD with wide range of weight histories  · Ideal Body Wt: 190 lb (86.2 kg), % Ideal Body >100%  · BMI Classification: BMI 25.0 - 29.9 Overweight    Nutrition Interventions:   Modify current diet(Carb Control 5 diet.   Start frozen ONS BID)  Continued Inpatient Monitoring, Education Not Indicated    Nutrition Evaluation:   · Evaluation: Goals set   · Goals: po intake > 75% of meals and supplements    · Monitoring: Meal Intake, Supplement Intake, Weight, Pertinent Labs      Electronically signed by Nellie Webster RD, LD on 6/5/19 at 2:12 PM

## 2019-06-06 NOTE — PROGRESS NOTES
[facility] OCCUPATIONAL THERAPY EVALUATION - ACUTE     Date: 2019  Patient Name: Emerita Diaz        MRN: [id number]  Account: [de-identified]   : 1931  (80 y.o.)  Room: [place] [address]    Chart Review:  Diagnosis:  The primary encounter diagnosis was Pyelonephritis. Diagnoses of Leukocytosis, unspecified type, Retention of urine, and Fatigue, unspecified type were also pertinent to this visit. Past Medical History:   Diagnosis Date    Anticoagulant long-term use     xarelto    Atrial fibrillation (HCC)     Bell's palsy     BPH (benign prostatic hyperplasia)     CAD (coronary artery disease)     Cardiomyopathy (HCC)     Chronic kidney disease     Hyperlipidemia     Hypertension     Osteoarthritis     Type II or unspecified type diabetes mellitus without mention of complication, not stated as uncontrolled      Past Surgical History:   Procedure Laterality Date    CORONARY ANGIOPLASTY      DIAGNOSTIC CARDIAC CATH LAB PROCEDURE      EYE SURGERY Bilateral 2018    PACEMAKER INSERTION N/A     SKIN CANCER EXCISION      SKIN CANCER EXCISION         Restrictions  Restrictions/Precautions: Fall Risk(NPO)  Position Activity Restriction  Other position/activity restrictions: Pt with hansen. Pt wife reported pt instructed not to keep head down d/t previous procedures. Safety Devices: Safety Devices  Safety Devices in place: Yes  Type of devices:  All fall risk precautions in place    Subjective       Pain Reassessment:   Pain Assessment  Patient Currently in Pain: Denies       Orientation  Orientation  Overall Orientation Status: Within Functional Limits(used board for date, stated hopital in [facility] with increase time)    Prior Level of Function:  Social/Functional History  Lives With: Spouse  Type of Home: House  Home Layout: Able to Live on Main level with bedroom/bathroom, Multi-level  Home Access: Level entry  Bathroom Shower/Tub: Walk-in shower(1/2 bath main floor, basement has walkin )  Bathroom Equipment: Grab bars in shower, Shower chair, Grab bars around toilet  Home Equipment: Roll About(stair lift )  ADL Assistance: Needs assistance(PRN assistance )  Homemaking Assistance: Needs assistance(\"daughter with degrees assit 24 hrs\" )  Ambulation Assistance: Independent(with AD)  Transfer Assistance: Independent  Active : No    OBJECTIVE:     Orientation Status:  Orientation  Overall Orientation Status: Within Functional Limits(used board for date, stated hopital in [facility] with increase time)    Observation:  Observation/Palpation  Observation: Pt lying in bed with no signs of distress. Cognition Status:  Cognition  Overall Cognitive Status: WFL(recall 2/3 words after 1 min IND'ly )    Perception Status:  Perception  Overall Perceptual Status: WFL    Sensation Status:  Sensation  Overall Sensation Status: WFL    Vision and Hearing Status:  Vision  Vision: Impaired  Vision Exceptions: Wears glasses for reading  Hearing  Hearing: Exceptions to [facility]  Hearing Exceptions: Bilateral hearing aid     ROM:   LUE AROM (degrees)  LUE AROM : WFL  Left Hand AROM (degrees)  Left Hand AROM: WFL  RUE AROM (degrees)  RUE AROM : WFL  Right Hand AROM (degrees)  Right Hand AROM: WFL    Strength:  LUE Strength  L Hand General: 4-/5  LUE Strength Comment: 4-/5  RUE Strength  R Hand General: 4-/5  RUE Strength Comment: 4-/5    Coordination, Tone, Quality of Movement: Tone RUE  RUE Tone: Normotonic  Tone LUE  LUE Tone: Normotonic  Coordination  Movements Are Fluid And Coordinated: No  Coordination and Movement description: Decreased speed    Hand Dominance:  Hand Dominance  Hand Dominance: Right    ADL Status:  ADL  Feeding: Independent  Grooming: Setup  UE Bathing: Stand by assistance  LE Bathing: Minimal assistance(to reach B feet )  UE Dressing: Supervision  LE Dressing: Moderate assistance(pt unable to make figure 4 to doff socks.  Simulated pants management. )  Toileting: Contact guard assistance(Pt wiped buttocks bed side. Pad soiled and was changed. )  Additional Comments: ADL's simulated or perfomred bedside. Toilet Transfers  Toilet Transfer: Unable to assess  Toilet Transfers Comments: anticipated CGA       Functional Mobility:  Functional Mobility  Functional - Mobility Device: Rolling Walker  Activity: Other(toward chair)  Assist Level: Contact guard assistance  Functional Mobility Comments: Pt declined to sit in chair. Transfers  Sit to stand: Contact guard assistance  Stand to sit: Contact guard assistance    Bed Mobility  Bed mobility  Supine to Sit: Contact guard assistance(bed flat, increased effort and time, use of hand rail. )  Sit to Supine: Stand by assistance(bed flat, increased effort )    Seated and Standing Balance:  Balance  Sitting Balance: Stand by assistance  Standing Balance: Contact guard assistance    Functional Endurance:  Activity Tolerance  Activity Tolerance: Fair     D/C Recommendations:    Equipment Recommendations:      OT Follow Up:  OT D/C RECOMMENDATIONS  REQUIRES OT FOLLOW UP: Yes       Assessment/Discharge Disposition:  Assessment: Pt is an 80 y.o male with above mentioned deficits. Per wife, pt with some memory problmes after intervention for intercranial bleeds. Pt NPO for stent placement for later today. Pt may benefit from skilled OT services to address deficits and increase safety and performance with ADL's to hightest level possible.     Performance deficits / Impairments: Decreased functional mobility , Decreased ADL status, Decreased endurance, Decreased balance, Decreased coordination  Prognosis: Good  Discharge Recommendations: Continue to assess pending progress  History: Multi comorb   Exam: 5 perf imp   Assistance / Modification: Min/Mod A     Six Click Score   How much help for putting on and taking off regular lower body clothing?: A Lot  How much help for Bathing?: A Little  How much help for Toileting?: A Little  How much help for putting on and taking off regular upper body clothing?: A Little  How much help for taking care of personal grooming?: None  How much help for eating meals?: None  AM-PAC Inpatient Daily Activity Raw Score: 19  AM-PAC Inpatient ADL T-Scale Score : 40.22  ADL Inpatient CMS 0-100% Score: 42.8    Plan:  Plan  Times per week: 1-3  Plan weeks: LOS  Current Treatment Recommendations: Balance Training, Functional Mobility Training, Endurance Training, Self-Care / ADL, Patient/Caregiver Education & Training, Equipment Evaluation, Education, & procurement, Safety Education & Training    Goals:   Patient will:    - Improve functional endurance to tolerate/complete 30-45 mins of ADL's  - Be IND in UB ADLs   - Be Mod IND in LB ADLs  - Be Mod IND in ADL transfers without LOB  - Be IND in toileting tasks  - Improve M UE strength and endurance to K in order to participate in self-care activities as projected. - Access appropriate D/C site with as few architectural barriers as possible. Patient Goal:    Pt would like to return home with James Pierson. Pt may benefit from therapy at d/c. Therapy Time:   OT Individual Minutes  Time In: 1110  Time Out: 1138  Minutes: 26    Electronically signed by:     MIRNA Bueno  6/6/2019, 12:40 PM

## 2019-06-06 NOTE — ANESTHESIA POSTPROCEDURE EVALUATION
Department of Anesthesiology  Postprocedure Note    Patient: Brenda Mayen  MRN: [id number]  YOB: 1931  Date of evaluation: 6/6/2019  Time:  2:30 PM     Procedure Summary     Date:  06/06/19 Room / Location:  [facility]    Anesthesia Start:  8485 Anesthesia Stop:      Procedure:  CYSTOSCOPY, LEFT DOUBLE J STENT INSERTION (Left ) Diagnosis:  (INPATIENT)    Surgeon:  John Marley MD Responsible Provider:  Yang Mckeon MD    Anesthesia Type:  general ASA Status:  4          Anesthesia Type: general    Jeb Phase I:      Jeb Phase II:      Last vitals: Reviewed and per EMR flowsheets.        Anesthesia Post Evaluation    Patient location during evaluation: PACU  Patient participation: complete - patient participated  Level of consciousness: awake and alert  Pain score: 1  Airway patency: patent  Nausea & Vomiting: no nausea and no vomiting  Complications: no  Cardiovascular status: hemodynamically stable  Respiratory status: acceptable and face mask  Hydration status: euvolemic  Comments: Report to RN, paced rhythm

## 2019-06-06 NOTE — ANESTHESIA PRE PROCEDURE
Department of Anesthesiology  Preprocedure Note       Name:  Astrid Angeles   Age:  80 y.o.  :  1931                                          MRN:  [id number]         Date:  2019      Surgeon: Ardeen Crigler):  Ivan Herrmann MD    Procedure: CYSTOSCOPY, LEFT DOUBLE J STENT INSERTION, ROOM 464 (Left )    Medications prior to admission:   Prior to Admission medications    Medication Sig Start Date End Date Taking? Authorizing Provider   SITagliptin (JANUVIA) 50 MG tablet Take 50 mg by mouth daily   Yes Historical Provider, MD   amantadine (SYMMETREL) 100 MG capsule Take 100 mg by mouth daily   Yes Historical Provider, MD   carvedilol (COREG) 12.5 MG tablet Take 12.5 mg by mouth 2 times daily (with meals)   Yes Historical Provider, MD   glipiZIDE (GLUCOTROL) 5 MG tablet Take 2.5 mg by mouth daily   Yes Historical Provider, MD   pantoprazole (PROTONIX) 40 MG tablet Take 40 mg by mouth daily   Yes Historical Provider, MD   furosemide (LASIX) 20 MG tablet Take 1 tablet by mouth daily 19  Yes Luciano Conteh MD   blood glucose monitor strips by Other route 4 times daily 19  Yes Kristen Cain MD   CPAP Machine MISC by Does not apply route New Auto CPAP 8-20 cm with [place] [employer] Simplus, Size L 18  Yes Asher Hines MD   Respiratory Therapy Supplies JOE New CPAP mask and supplies 3/14/18  Yes Asher Hines MD   glucose blood VI test strips (EXACTECH TEST) strip 1 each by In Vitro route 4 times daily As needed. 10/4/17  Yes Elías Green MD   pravastatin (PRAVACHOL) 80 MG tablet Take 80 mg by mouth daily.      Yes Historical Provider, MD       Current medications:    Current Facility-Administered Medications   Medication Dose Route Frequency Provider Last Rate Last Dose    chlorproMAZINE (THORAZINE) tablet 10 mg  10 mg Oral TID PRN Elijah Franco MD        magnesium sulfate 2 g in 50 mL IVPB premix  2 g Intravenous Once Elijah Franco MD        chlorhexidine (HIBICLENS) 4 % liquid at 06/05/19 2113    glucose (GLUTOSE) 40 % oral gel 15 g  15 g Oral PRN Sylvie Randhawa MD        dextrose 50 % IV solution  12.5 g Intravenous PRN Sylvie Randhawa MD        glucagon (rDNA) injection 1 mg  1 mg Intramuscular PRN Gretel Kelly MD        dextrose 5 % solution  100 mL/hr Intravenous PRN Sylvie Randhawa MD        insulin lispro (HUMALOG) injection vial 0-6 Units  0-6 Units Subcutaneous TID WC Sylvie Randhawa MD   1 Units at 06/05/19 1713    insulin lispro (HUMALOG) injection vial 0-3 Units  0-3 Units Subcutaneous Nightly Sylvie Randhawa MD   1 Units at 06/05/19 2256       Allergies:  No Known Allergies    Problem List:    Patient Active Problem List   Diagnosis Code    Type II diabetes mellitus, uncontrolled ([facility]) E11.65    CHF (congestive heart failure) I50.9    BPH (benign prostatic hyperplasia) N40.0    Hypercholesteremia E78.00    CKD (chronic kidney disease) stage 3, GFR 30-59 ml/min ([facility]) N18.3    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    Acute sinusitis J01.90    Basal cell carcinoma of nose C44.311    Retained foreign body of eyelid H02.819, Z18.9    Tear film insufficiency H04.129    Hypertension I10    Atrial fibrillation (HCC) I48.91    Anticoagulant long-term use Z79.01    Cardiomyopathy ([address] I42.9    AICD (automatic cardioverter/defibrillator) present Z95.810    DCM (dilated cardiomyopathy) ([facility] I42.0    Coronary artery disease involving native coronary artery of native heart with angina pectoris ([address] I25.119    CHAYA (acute kidney injury) ([address] N17.9    UTI (urinary tract infection) N39.0    Anticoagulated Z79.01    Aphasia R47.01    Bacteriuria R82.71    Encephalopathy G93.40    Left-sided Bell's palsy G51.0    Leukocytosis D72.829    Subdural hematoma ([address] S06.5X9A    Type 2 diabetes mellitus with hyperglycemia ([address] E11.65    Implantable cardioverter-defibrillator (ICD) in situ Z95.810    Dilated cardiomyopathy ([address] I42.0    116/64   05/22/19 (!) 142/74       NPO Status: Time of last liquid consumption: 0000                        Time of last solid consumption: 0000                        Date of last liquid consumption: 06/05/19                        Date of last solid food consumption: 06/05/19    BMI:   Wt Readings from Last 3 Encounters:   06/04/19 225 lb 12.8 oz (102.4 kg)   05/28/19 218 lb (98.9 kg)   05/22/19 225 lb (102.1 kg)     Body mass index is 28.99 kg/m².     CBC:   Lab Results   Component Value Date    WBC 13.1 06/06/2019    RBC 3.16 06/06/2019    HGB 9.3 06/06/2019    HCT 28.2 06/06/2019    MCV 89.4 06/06/2019    RDW 17.6 06/06/2019     06/06/2019       CMP:   Lab Results   Component Value Date     06/06/2019    K 4.5 06/06/2019    K 4.2 06/05/2019    CL 98 06/06/2019    CO2 18 06/06/2019    BUN 67 06/06/2019    CREATININE 3.54 06/06/2019    GFRAA 19.9 06/06/2019    LABGLOM 16.4 06/06/2019    GLUCOSE 127 06/06/2019    GLUCOSE 141 12/30/2011    PROT 6.9 06/04/2019    CALCIUM 7.6 06/06/2019    BILITOT 1.0 06/04/2019    ALKPHOS 107 06/04/2019    AST 18 06/04/2019    ALT 25 06/04/2019       POC Tests:   Recent Labs     06/06/19  1153   POCGLU 137*       Coags:   Lab Results   Component Value Date    PROTIME 12.7 03/28/2019    INR 1.3 03/28/2019       HCG (If Applicable): No results found for: PREGTESTUR, PREGSERUM, HCG, HCGQUANT     ABGs: No results found for: PHART, PO2ART, HZG9LQQ, HDC8JTL, BEART, F0RIXNWS     Type & Screen (If Applicable):  No results found for: DEISI, [facility]    Anesthesia Evaluation  Patient summary reviewed and Nursing notes reviewed  Airway: Mallampati: III  TM distance: <3 FB   Neck ROM: full  Mouth opening: > = 3 FB Dental:          Pulmonary:normal exam    (+) sleep apnea: on CPAP,                             Cardiovascular:    (+) hypertension: moderate, angina:, pacemaker: AICD, CAD: no interval change, dysrhythmias: atrial fibrillation, CHF: systolic,                ROS comment: Last echo EF 55%. Valves OK>      Neuro/Psych:   (+) neuromuscular disease:,              ROS comment: Dementia  Subdural hematomas; drained in the OR 3/2019 GI/Hepatic/Renal:            ROS comment: BPH. Endo/Other:    (+) DiabetesType II DM, , .                  ROS comment: Sepsis from UTI Abdominal:           Vascular:                                        Anesthesia Plan      general     ASA 4     (Recent CVA/current urosepsis. Etomidate. )  Induction: intravenous. Anesthetic plan and risks discussed with patient and spouse. Use of blood products discussed with patient and spouse whom.                    Galvin Saint, MD   6/6/2019

## 2019-06-06 NOTE — PROGRESS NOTES
Urology  CT shows ureteral calculus with hydronephrosis LEFT  Cysto stent today  NPO  John Marley MD

## 2019-06-06 NOTE — PROGRESS NOTES
Physical Therapy   [facility] Q439/N022-71    NAME: Beba Melara    : 1931 (80 y.o.)  MRN: [id number]    Account: [de-identified]  Gender: male    PT evaluation and treatment orders received. Chart reviewed. PT eval attempted. Patient Unavailable: in SS    Will attempt PT evaluation again at earliest convenience.       Electronically signed by Stephanie Walker PT on 19 at 1:13 PM

## 2019-06-06 NOTE — BRIEF OP NOTE
Brief Postoperative Note  ______________________________________________________________    Patient: Radha Powell  YOB: 1931  MRN: [id number]  Date of Procedure: 6/6/2019    Pre-Op Diagnosis: INPATIENT    Post-Op Diagnosis: Same       Procedure(s):  CYSTOSCOPY, LEFT DOUBLE J STENT INSERTION    Anesthesia: General    Surgeon(s):  Daniel Noriega MD    Assistant:       Estimated Blood Loss (mL): less than 50     Complications: None    Specimens:   ID Type Source Tests Collected by Time Destination   1 : URINE Body Fluid Bladder SURGICAL CULTURE Daniel Noriega MD 6/6/2019 1404        Implants:  Implant Name Type Inv.  Item Serial No.  Lot No. LRB No. Used   STENT URET W/O GUIDEWIRE 1USE [id number] Stent:Urological STENT URET W/O GUIDEWIRE 1USE [id number]  [employer]  Left 1         Drains:   Urethral Catheter 16 fr (Active)       [REMOVED] Urethral Catheter Non-latex 16 fr (Removed)   $ Urethral catheter insertion $ Not inserted for procedure 6/4/2019 10:26 AM   Catheter Indications Acute urinary retention/obstruction;Urology/Urologist seeing this patient or inserted indwelling catheter 6/6/2019  8:11 AM   Securement Device Date Changed 06/04/19 6/4/2019 10:26 AM   Site Assessment No urethral drainage 6/6/2019  8:11 AM   Urine Color Bhakti 6/6/2019  8:11 AM   Urine Appearance Clear 6/6/2019  5:54 AM   Urine Odor Malodorous 6/6/2019  5:54 AM   Output (mL) 275 mL 6/6/2019  5:54 AM       Findings: normal cysto/purulent urine left kidney/culture sent    Daniel Noriega MD  Date: 6/6/2019  Time: 2:09 PM

## 2019-06-06 NOTE — PROGRESS NOTES
Vitals:    06/06/19 0717   BP: 118/71   Pulse: 63   Resp: 18   Temp: 97.9 °F (36.6 °C)   SpO2: 95%     Pt assessment completed. Alert and oriented x 3, disoriented to situation stated \"im here for the reoccurance of the ailment\" unable to elaborate. Lung sounds clear bilaterally. RA L upper pacemaker noted. S1, S2 audible, no adventitious sounds. Peripheral pulses palpable. Bowel sounds active x 4 quadrants. Soft, non-tender. Skin Intact, cranial incision healed with scab to upper left skull. Patient denies dyspnea, pain, and any further needs at this time. Continues to have hiccups.     Will continue to monitor and hourly round

## 2019-06-06 NOTE — PROGRESS NOTES
Nephrology Progress Note    Assessment:  80y.o. year old male with history s/f T2DM, HTN, CHF, A.fib, HTN and CAD who presented for weakness, decreased UOP     1. CHAYA on CKD stage III: possibly 2/2 LT sided hydronephrosis causing obstructive nephropathy (stone related vs. BPH)  2. CKD stage III: baseline Scr ~1.7-1.9 w/ eGFR mid/high 30s, risk factors include T2DM, HTN, CAD, CHF, ?obstructive nephropathy (has several renal stones)   3. Hyponatremia  4. Gram negative bacteremia     Plan:  - will f/u renal function following cysto     Thank you for the consultation. Will continue to follow  Please do not hesitate to call with questions.       Patient Active Problem List:     Type II diabetes mellitus, uncontrolled (HCC)     CHF (congestive heart failure)     BPH (benign prostatic hyperplasia)     Hypercholesteremia     CKD (chronic kidney disease) stage 3, GFR 30-59 ml/min ([facility])     Dystrophy of anterior cornea     Acute sinusitis     Basal cell carcinoma of nose     Retained foreign body of eyelid     Tear film insufficiency     Hypertension     Atrial fibrillation (HCC)     Anticoagulant long-term use     Cardiomyopathy ([address]     AICD (automatic cardioverter/defibrillator) present     DCM (dilated cardiomyopathy) ([address]     Coronary artery disease involving native coronary artery of native heart with angina pectoris ([address]     CHAYA (acute kidney injury) ([address]     UTI (urinary tract infection)     Anticoagulated     Aphasia     Bacteriuria     Encephalopathy     Left-sided Bell's palsy     Leukocytosis     Subdural hematoma (HCC)     Type 2 diabetes mellitus with hyperglycemia ([facility])     Implantable cardioverter-defibrillator (ICD) in situ     Dilated cardiomyopathy (HCC)     Congestive heart failure (HCC)     Chronic renal insufficiency, stage III (moderate) ([facility])     Coronary arteriosclerosis in native artery     Corneal epithelial dystrophy     Hypercholesterolemia     Tear film insufficiency, unspecified     Type 2 diabetes mellitus ([facility]     Acute kidney injury ([facility]     Long term current use of anticoagulant therapy     Atrial fibrillation (HCC)     Benign prostatic hyperplasia     Sepsis due to urinary tract infection ([facility]      Subjective:  Admit Date: 6/4/2019    Interval History: plan for cysto this AM, pt more alert and oriented today, on abx, UCx grew klebsiella    Medications:  Scheduled Meds:   cefepime  1 g Intravenous Q12H    polyethylene glycol  17 g Oral Daily    sennosides-docusate sodium  1 tablet Oral Daily    lactobacillus acidophilus  4 tablet Oral TID    b complex-C-folic acid  1 capsule Oral Daily    amantadine  100 mg Oral Daily    pantoprazole  40 mg Oral Daily    sodium chloride flush  10 mL Intravenous 2 times per day    heparin (porcine)  5,000 Units Subcutaneous 3 times per day    pravastatin  80 mg Oral Nightly    insulin lispro  0-6 Units Subcutaneous TID WC    insulin lispro  0-3 Units Subcutaneous Nightly     Continuous Infusions:   sodium chloride 1,000 mL (06/06/19 0554)    dextrose         CBC:   Recent Labs     06/05/19  0721 06/06/19  1119   WBC 14.0* 13.1*   HGB 9.8* 9.3*    160     CMP:    Recent Labs     06/04/19  0230 06/05/19  0721   * 132*   K 4.6 4.2   CL 93* 96   CO2 23 20   BUN 45* 57*   CREATININE 2.87* 3.66*   GLUCOSE 140* 115*   CALCIUM 8.8 8.2*   LABGLOM 20.9* 15.8*     Troponin: No results for input(s): TROPONINI in the last 72 hours. BNP: No results for input(s): BNP in the last 72 hours. INR: No results for input(s): INR in the last 72 hours. Lipids: No results for input(s): CHOL, LDLDIRECT, TRIG, HDL, AMYLASE, LIPASE in the last 72 hours. Liver:   Recent Labs     06/04/19  0230   AST 18   ALT 25   ALKPHOS 107*   PROT 6.9   LABALBU 3.2*   BILITOT 1.0*     Iron:  No results for input(s): IRONS, FERRITIN in the last 72 hours. Invalid input(s): LABIRONS  Urinalysis: No results for input(s): UA in the last 72 hours.     Objective:  Vitals: BP 118/71   Pulse 63   Temp 97.9 °F (36.6 °C) (Oral)   Resp 18   Ht 6' 2\" (1.88 m)   Wt 225 lb 12.8 oz (102.4 kg)   SpO2 95%   BMI 28.99 kg/m²    Wt Readings from Last 3 Encounters:   06/04/19 225 lb 12.8 oz (102.4 kg)   05/28/19 218 lb (98.9 kg)   05/22/19 225 lb (102.1 kg)      24HR INTAKE/OUTPUT:      Intake/Output Summary (Last 24 hours) at 6/6/2019 1142  Last data filed at 6/6/2019 0559  Gross per 24 hour   Intake 4670 ml   Output 475 ml   Net 4195 ml     General: alert, in no apparent distress  HEENT: normocephalic, atraumatic, anicteric  Neck: supple, no mass  Lungs: non-labored respirations, clear to auscultation bilaterally  Heart: regular rate and rhythm, no murmurs or rubs  Abdomen: soft, non-tender, non-distended  MSK: no joint swelling or tenderness  : + hansen catheter, no CVA tenderness   Ext: no cyanosis, no peripheral edema  Neuro: alert and oriented, no gross abnormalities  Psych: normal mood and affect          Electronically signed by Annika Olivo MD

## 2019-06-06 NOTE — PROGRESS NOTES
Caden Snell  8/29/1931  male  Medical Record Number: [id number]    Patient informed that I am an Infectious Disease physician and permission obtained from the patient to speak in front of any visitors prior to any discussion for HIPPA purposes. HPI: Patient with GNR bacteremia , decreased urine output, increasing weakness. + nausea  Hx of CHF, AF, T2DM, HTN, CAD  + BC 2/2 Klebsiella pneumoniae - multi drug sensitive.      Went for CYSTOSCOPY, LEFT DOUBLE J STENT INSERTION      Past Medical History:   Diagnosis Date    Anticoagulant long-term use     xarelto    Atrial fibrillation (HCC)     Bell's palsy     BPH (benign prostatic hyperplasia)     CAD (coronary artery disease)     Cardiomyopathy (HCC)     Chronic kidney disease     Hyperlipidemia     Hypertension     Osteoarthritis     Type II or unspecified type diabetes mellitus without mention of complication, not stated as uncontrolled        Past Surgical History:   Procedure Laterality Date    CORONARY ANGIOPLASTY      DIAGNOSTIC CARDIAC CATH LAB PROCEDURE      EYE SURGERY Bilateral 03/2018    PACEMAKER INSERTION N/A 1994    SKIN CANCER EXCISION  2016    SKIN CANCER EXCISION         Social History     Socioeconomic History    Marital status:      Spouse name: Not on file    Number of children: Not on file    Years of education: Not on file    Highest education level: Not on file   Occupational History    Not on file   Social Needs    Financial resource strain: Not on file    Food insecurity:     Worry: Not on file     Inability: Not on file    Transportation needs:     Medical: Not on file     Non-medical: Not on file   Tobacco Use    Smoking status: Never Smoker    Smokeless tobacco: Never Used   Substance and Sexual Activity    Alcohol use: No     Alcohol/week: 0.0 oz    Drug use: No    Sexual activity: Not on file   Lifestyle    Physical activity:     Days per week: Not on file     Minutes per session: Not on file  Stress: Not on file   Relationships    Social connections:     Talks on phone: Not on file     Gets together: Not on file     Attends Congregational service: Not on file     Active member of club or organization: Not on file     Attends meetings of clubs or organizations: Not on file     Relationship status: Not on file    Intimate partner violence:     Fear of current or ex partner: Not on file     Emotionally abused: Not on file     Physically abused: Not on file     Forced sexual activity: Not on file   Other Topics Concern    Not on file   Social History Narrative    Not on file       Family History   Problem Relation Age of Onset    Cancer Mother     Coronary Art Dis Father     Coronary Art Dis Brother     Hypertension Brother     High Cholesterol Brother     Diabetes Brother        No current facility-administered medications on file prior to encounter. Current Outpatient Medications on File Prior to Encounter   Medication Sig Dispense Refill    SITagliptin (JANUVIA) 50 MG tablet Take 50 mg by mouth daily      amantadine (SYMMETREL) 100 MG capsule Take 100 mg by mouth daily      carvedilol (COREG) 12.5 MG tablet Take 12.5 mg by mouth 2 times daily (with meals)      glipiZIDE (GLUCOTROL) 5 MG tablet Take 2.5 mg by mouth daily      pantoprazole (PROTONIX) 40 MG tablet Take 40 mg by mouth daily      furosemide (LASIX) 20 MG tablet Take 1 tablet by mouth daily 30 tablet 3    blood glucose monitor strips by Other route 4 times daily 400 strip 2    CPAP Machine MISC by Does not apply route New Auto CPAP 8-20 cm with [employer], Size L 1 each 0    Respiratory Therapy Supplies JOE New CPAP mask and supplies 1 Device 0    glucose blood VI test strips (EXACTECH TEST) strip 1 each by In Vitro route 4 times daily As needed. 100 each 11    pravastatin (PRAVACHOL) 80 MG tablet Take 80 mg by mouth daily. No changes on exam - going to surgery    Labs:    I have reviewed all lab results by electronic record, including most recent CBC, metabolic panel, and pertinent abnormalities were addressed from an infectious disease perspective. WBC trends are being monitored. Lab Results   Component Value Date     06/06/2019    K 4.5 06/06/2019    K 4.2 06/05/2019    CL 98 06/06/2019    CO2 18 06/06/2019    BUN 67 06/06/2019    CREATININE 3.54 06/06/2019    GLUCOSE 127 06/06/2019    GLUCOSE 141 12/30/2011    CALCIUM 7.6 06/06/2019      Lab Results   Component Value Date    WBC 13.1 (H) 06/06/2019    HGB 9.3 (L) 06/06/2019    HCT 28.2 (L) 06/06/2019    MCV 89.4 06/06/2019     06/06/2019       Radiology:   I have reviewed imaging results per electronic record and most pertinent abnormalities are being addressed from an infectious disease standpoint. Assessment:  GNR sepsis with bacteremia secondary to UTI ( GNR )   Klebsiella pneumoniae - multi drug sensitive. Pyuria and microscopic hematuria  Dehydration and CHAYA on CKD 3  DM2 - uncontrolled  Urinary retention/ hx of BPH      Plan:  Optimal glycemic control  Supportive care  Started on Cefepime - can change to Rocephin IV. Repeat BC x 2. Plan for 2 weeks of IV Rocephin from date of clearance. Patient has stent now.      Kanika Katz D.O.

## 2019-06-06 NOTE — PLAN OF CARE
See OT evaluation for all goals and OT POC.  Electronically signed by CHACORTA Cantu/L on 6/6/2019 at 12:41 PM

## 2019-06-06 NOTE — PROGRESS NOTES
Telemetry. 3. Hiccups: need to repeat EKG prior to giving Thorazine  4. Diet: Diet NPO Effective Now  5. Advance Directive: Full Code   6. DVT prophylaxis: Chemical prophylaxis SQ  7. Discharge planning: SW on board. Will discharge once medically stable and cleared by all consultants. 8. High Risk Readmission Screening Tool Score Noted. Additionally, the following hospital problems were addressed:  Principal Problem:    Sepsis due to urinary tract infection ([facility]  Active Problems:    Atrial fibrillation ([facility]    AICD (automatic cardioverter/defibrillator) present    UTI (urinary tract infection)    Chronic renal insufficiency, stage III (moderate) (HCC)    Acute kidney injury ([facility]  Resolved Problems:    * No resolved hospital problems. *      ** Total time spent reviewing medical records, evaluating patient, speaking with RN's and consultants where I was focused exclusively on this patient: 35 minutes. This time is excluding time spent performing procedures or significant events occurring earlier or later in the day requiring my attention and focus. Subjective:   Admit Date: 6/4/2019  PCP: Dionne Gerard MD    No acute events overnight. Afebrile  Telemetry reviewed. (+) Hiccups No new complaints. Pt denies chest pain, SOB, N/V, fevers or chills. Objective:     Vitals:    06/05/19 1513 06/05/19 1630 06/05/19 1930 06/06/19 0717   BP: (!) 90/48 110/61 106/62 118/71   Pulse: 64 62 60 63   Resp: 18 16  18   Temp: 98.1 °F (36.7 °C) 97.2 °F (36.2 °C) 98.2 °F (36.8 °C) 97.9 °F (36.6 °C)   TempSrc: Oral Oral  Oral   SpO2: 94% 94% 97% 95%   Weight:       Height:         General appearance: Mild acute distress, agitated lethargic + O x 1 (+ ) R facial droop which is baseline   Lungs: CTAB no exp wheezes, No rales No retractions; No use of accessory muscles  Heart:  S1, S2 normal, RRR, no MRG appreciated  Abdomen: (+) BS, soft, non-tender; non distended no guarding or rigidity.    Extremities:  no cyanosis,

## 2019-06-06 NOTE — PROGRESS NOTES
Pt assessment is completed and vitals are stable. He had continuous hiccups. Ordered 1X dose of Thiamine IV which did not work.    Electronically signed by India Lopez RN on 6/6/2019 at 2:41 AM

## 2019-06-06 NOTE — OP NOTE
[address]                      [facility], [address] [facility]                                OPERATIVE REPORT    PATIENT NAME: Mejia Zaman                  :        1931  MED REC NO:   [id number]                            ROOM:       T169  ACCOUNT NO:   [de-identified]                           ADMIT DATE: 2019  PROVIDER:     Lea Lopes MD    DATE OF PROCEDURE:  2019    PREOPERATIVE DIAGNOSES:  Urosepsis with obstructing left distal ureteral  stone with hydronephrosis. POSTOPERATIVE DIAGNOSES:  Urosepsis with obstructing left distal  ureteral stone with hydronephrosis. OPERATIONS PERFORMED:  Cystoscopy, left retrograde pyelogram, left  double-J stent placement. SURGEON:  [place]. Katt Bishop MD.    ANESTHESIA:  General.    INDICATIONS:  This is an 42-year-old male with the history of  hypertension, pacemaker, chronic kidney disease, history of recurrent  calculi, diabetes, BPH on Flomax and Proscar, and history of  bilateral renal cysts. The patient was admitted for a high white count  and low-grade fever with low blood pressure. The patient's lactic acid  was initially elevated. Current white count was 16.4. The patient has  both urine culture and blood cultures positive for gram-negative rods. The  patient had an ultrasound On admission which showed mild left  hydronephrosis. I ordered a CT scan which showed left hydronephrosis  with obstructing left distal ureteral stone. The patient also has some  stones in the left lower pole. Due to positive urine culture and  positive blood cultures and hydronephrosis, the patient will undergo  emergent left double-J stent placement. There are no plans of removing  the stone. The patient understands the risks and benefits and wants to  proceed. Family is well aware of the plans.     OPERATIVE NOTE:  The patient was brought down from the floor and taken  to the operating room and placed on the operating table in the dorsal  lithotomy position after initiation of general anesthetic. The patient  has been on IV antibiotics. After sterile prep and drape, a 21-Chinese  cystoscope was used to perform a cystoscopy. Penile urethra was within  normal limits. Prostatic urethra showed prominent median bar. Examination of the bladder showed a large capacity bladder with mild  trabeculation indicative of obstruction at the level of the bladder  outlet from BPH. Examination of bladder showed no evidence of tumors,  and/or other mucosal abnormalities. There were multiple small calculi  probably secondary to stasis, they were evacuated with an [employer]  evacuator. Following this, the left ureteral orifice was visualized. Multiple small calculi could be seen coming out of the left ureteral  orifice. A left retrograde pyelogram was obtained which showed a severe  ureteritis, most likely secondary to the obstruction and infection. A  filling defect consistent with a stone, was not observed. There was  mild resistance to the guidewire going up into the left collecting  system. Eventually, however, the wire was placed up on the left  collecting system and which was confirmed by fluoroscopy and a  retrograde pyelogram.  Following this, the ureteral catheter was  advanced and markedly purulent urine could be seen draining from this  right collecting system. This was sent for culture, and over the  guidewire, a 6 x 28 double-J stent was placed with a good curl observed  both in the renal pelvis and the bladder. Huynh catheter was  reinserted, and the patient was discharged to home in stable condition. The patient tolerated the procedure well. There were no complications. Final diagnoses is obstructive distal ureteral calculus, size  approximately 5-mm with severe pyelectasis.   The patient will have a CT  scan done once he is probably treated for his urosepsis, and at that  time, a final treatment for the stone and removal of the stent will be  made with consultations with Dr. Ar Fitch.       Martha Mabry MD    D: 06/06/2019 14:34:54       T: 06/06/2019 14:43:01     [id number]  Job#: [id number]     Doc#: [id number]    CC:

## 2019-06-07 NOTE — PROGRESS NOTES
dextrose 5 % solution PRN   insulin lispro (HUMALOG) injection vial 0-6 Units TID WC   insulin lispro (HUMALOG) injection vial 0-3 Units Nightly       Data:     Code Status:  Full Code    Family History   Problem Relation Age of Onset    Cancer Mother     Coronary Art Dis Father     Coronary Art Dis Brother     Hypertension Brother     High Cholesterol Brother     Diabetes Brother        Social History     Socioeconomic History    Marital status:      Spouse name: Not on file    Number of children: Not on file    Years of education: Not on file    Highest education level: Not on file   Occupational History    Not on file   Social Needs    Financial resource strain: Not on file    Food insecurity:     Worry: Not on file     Inability: Not on file    Transportation needs:     Medical: Not on file     Non-medical: Not on file   Tobacco Use    Smoking status: Never Smoker    Smokeless tobacco: Never Used   Substance and Sexual Activity    Alcohol use: No     Alcohol/week: 0.0 oz    Drug use: No    Sexual activity: Not on file   Lifestyle    Physical activity:     Days per week: Not on file     Minutes per session: Not on file    Stress: Not on file   Relationships    Social connections:     Talks on phone: Not on file     Gets together: Not on file     Attends Tenriism service: Not on file     Active member of club or organization: Not on file     Attends meetings of clubs or organizations: Not on file     Relationship status: Not on file    Intimate partner violence:     Fear of current or ex partner: Not on file     Emotionally abused: Not on file     Physically abused: Not on file     Forced sexual activity: Not on file   Other Topics Concern    Not on file   Social History Narrative    Not on file       Vitals:  /73   Pulse 60   Temp 97.7 °F (36.5 °C)   Resp 9   Ht 6' 2\" (1.88 m)   Wt 264 lb (119.7 kg)   SpO2 95%   BMI 33.90 kg/m²   Temp (24hrs), Av.9 °F (36.6 °C), Min:97.3 °F (36.3 °C), Max:98.2 °F (36.8 °C)    Recent Labs     06/06/19  1430 06/06/19  1559 06/06/19  2103 06/07/19  0803   POCGLU 115 123* 172* 125*       I/O (24Hr): Intake/Output Summary (Last 24 hours) at 6/7/2019 1155  Last data filed at 6/7/2019 0527  Gross per 24 hour   Intake 2320 ml   Output 1100 ml   Net 1220 ml       Labs:  Hematology:  Recent Labs     06/07/19  0614   WBC 11.6*   HGB 9.2*   HCT 28.5*        Chemistry:  Recent Labs     06/05/19  0721  06/07/19  0614   *   < > 131*   K 4.2   < > 4.3   CL 96   < > 102   CO2 20   < > 15*   GLUCOSE 115*   < > 118*   BUN 57*   < > 62*   CREATININE 3.66*   < > 3.13*   MG 2.0  --   --    ANIONGAP 16*   < > 14   LABGLOM 15.8*   < > 18.9*   GFRAA 19.1*   < > 22.9*   CALCIUM 8.2*   < > 7.2*    < > = values in this interval not displayed. Urine Culture   Lab Results   Component Value Date    LABURIN  06/06/2019     Growth present in less than 12 hours. Unable to identify  organisms at present time, culture reincubated. Further  results to follow in 24 hours. Physical Examination:        Physical Exam urine clear in Huynh bag    Assessment:         Improved clinically  Creatinine down to 3  WBC down to 11  Kidney culture showing growth  Huynh catheter draining clear urine  Active Hospital Problems    Diagnosis Date Noted    Pyelonephritis [N12]     Hydronephrosis, left [N13.30]     Left ureteral calculus [N20.1]     Gram negative sepsis ([address] [I48.09]     Complicated UTI (urinary tract infection) [N39.0] 06/04/2019    Sepsis due to urinary tract infection ([address] [A41.9, N39.0] 06/04/2019    Acute kidney injury ([address] [N17.9] 03/22/2019    AICD (automatic cardioverter/defibrillator) present [Z95.810] 06/25/2018    At[facility] fibrillation ([facility] [I48.91] 03/14/2016    Chronic renal insufficiency, stage III (moderate) ([facility] [N18.3] 10/21/2014         Plan:        1. Continue Huynh catheter until patient ambulating better  2.  No further recommendations at this time      Electronically signed by Layo Sparks MD on 6/7/2019 at 11:55 AM

## 2019-06-07 NOTE — PROGRESS NOTES
Physical Therapy Med Surg Initial Assessment  Facility/Department[facility]  [facility]       NAME: Caden Snell  : 1931 (80 y.o.)  MRN: [id number]  CODE STATUS: Full Code    Date of Service: 2019    Patient Diagnosis(es): UTI (urinary tract infection) [N39.0]   Chief Complaint   Patient presents with    Urinary Retention     Patient Active Problem List    Diagnosis Date Noted    Pyelonephritis     Hydronephrosis, left     Left ureteral calculus     Gram negative sepsis ([address]     Complicated UTI (urinary tract infection) 2019    Anticoagulated 2019    Aphasia 2019    Encephalopathy 2019    Left-sided Bell's palsy 2019    Sepsis due to urinary tract infection ([address] 2019    Dilated cardiomyopathy ([address] 2019    Atrial fibrillation ([address] 2019    Bacteriuria 04/10/2019    Leukocytosis 04/10/2019    Type 2 diabetes mellitus with hyperglycemia ([address] 04/10/2019    Subdural hematoma ([address] 2019    CHAYA (acute kidney injury) ([address] 2019    Acute kidney injury ([address] 2019    DCM (dilated cardiomyopathy) ([address] 2018    Coronary artery disease involving native coronary artery of native heart with angina pectoris ([address] 2018    Coronary arteriosclerosis in native artery 2018    AICD (automatic cardioverter/defibrillator) present 2018    Implantable cardioverter-defibrillator (ICD) in situ 2018    Cardiomyopathy ([address]     Hypertension 2016    Atrial fibrillation ([address] 2016    Anticoagulant long-term use 2016    Long term current use of anticoagulant therapy 2016    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) 10/21/2014    Chronic renal insufficiency, stage III (moderate) (HCC) 10/21/2014    BPH (benign prostatic hyperplasia) 09/16/2012    Hypercholesteremia 09/16/2012    Hypercholesterolemia 09/16/2012    Benign prostatic hyperplasia 09/16/2012    Type II diabetes mellitus, uncontrolled ([facility] 09/13/2012    CHF (congestive heart failure) 09/13/2012    Congestive heart failure ([facility] 09/13/2012    Type 2 diabetes mellitus ([facility] 09/13/2012    Basal cell carcinoma of nose 11/16/2011    Acute sinusitis 10/14/2008        Past Medical History:   Diagnosis Date    Anticoagulant long-term use     xarelto    Atrial fibrillation (HCC)     Bell's palsy     BPH (benign prostatic hyperplasia)     CAD (coronary artery disease)     Cardiomyopathy ([facility]     Chronic kidney disease     Hyperlipidemia     Hypertension     Osteoarthritis     Type II or unspecified type diabetes mellitus without mention of complication, not stated as uncontrolled      Past Surgical History:   Procedure Laterality Date    CORONARY ANGIOPLASTY      CYSTOSCOPY INSERTION / REMOVAL STENT / STONE Left 6/6/2019    CYSTOSCOPY, LEFT DOUBLE J STENT INSERTION performed by Magno Howard MD at [address] [place] [address] CATH LAB PROCEDURE      EYE SURGERY Bilateral 03/2018    PACEMAKER INSERTION N/A 1994    SKIN CANCER EXCISION  2016    SKIN CANCER EXCISION         Chart Reviewed: Yes  Patient assessed for rehabilitation services?: Yes  Family / Caregiver Present: No  Other (Comment): pt was able to answer/ respond appropriately about 50% of time; reports he can hear this therapist but often responds with blank stares; unable to get entire PLOF, pulling some information from chart and OT eval    Restrictions:  Restrictions/Precautions: Fall Risk  Position Activity Restriction  Other position/activity restrictions: Pt with hansen. Pt wife reported pt instructed not to keep head down d/t previous procedures.        SUBJECTIVE:    Pre Treatment Pain Screening  Pain at present: 0    Post Treatment Pain Screening:   Pain Screening  Patient Currently in Pain: No  Pain Assessment  Pain Level: 0    Prior Level of Function:  Social/Functional History  Lives With: Spouse  Type of Home: House  Home Layout: Able to Live on Main level with bedroom/bathroom, Multi-level  Home Access: Level entry  Bathroom Shower/Tub: Walk-in shower(1/2 bath main floor, basement has walkin )  Bathroom Equipment: Grab bars in shower, Shower chair, Grab bars around toilet  Home Equipment: Roll About(stair lift )  ADL Assistance: Needs assistance(PRN assistance )  Homemaking Assistance: Needs assistance(\"daughter with degrees assit 24 hrs\" )  Ambulation Assistance: Independent(with AD)  Transfer Assistance: Independent  Active : No    OBJECTIVE:   Vision/Hearing:       Cognition:  Follows Commands: Impaired         ROM:  RLE AROM: WFL  LLE AROM : WFL    Strength:  Strength Other  Other: pt was unable to participate in MMT due to confusion    Neuro:  Balance  Sitting - Static: Good  Sitting - Dynamic: Good  Standing - Static: Fair  Standing - Dynamic: Fair;-             Bed mobility  Supine to Sit: Contact guard assistance  Sit to Supine: Stand by assistance  Comment: pt came to EOB with CGA, sat about 30 seconds before impulsively laying back down pulling his Huynh and IV; RN present and assisted encouraging pt to come back to upright and get up to chair    Transfers  Sit to Stand: Minimal Assistance  Stand to sit: Minimal Assistance  Bed to Chair: Minimal assistance(assist to stabilize walker and maintain postural control throughout pivot to chair)  Comment: noted stool on ground once pt stood; RN present to assist with hygiene as pt stood with therapist at [employer], able to maintain >1 min without LOB and minimal UE support    Ambulation  Ambulation?: Yes  Ambulation 1  Surface: level tile  Device: Rolling Walker  Gait Deviations: Decreased step length;Decreased step height  Distance: 3 ft to chair    Activity Tolerance  Activity Tolerance: Patient limited by cognitive status          ASSESSMENT:   Body structures, Functions, Activity limitations: Decreased functional mobility ; Decreased strength;Decreased endurance;Decreased coordination;Decreased balance;Decreased cognition;Decreased safe awareness  Decision Making: Medium Complexity  History: high  Exam: high  Clinical Presentation: medium    Prognosis: Good  Patient Education: falls precautions, oriented to call light, safety awareness, PT POC    DISCHARGE RECOMMENDATIONS:  Discharge Recommendations: Continue to assess pending progress, Patient would benefit from continued therapy after discharge    Assessment: Pt demonstrates the above deficits and decline in functional mobility status placing them at increased risk for falls. Pt would benefit from physical therapy to address above deficits and allow for safe return home at highest level of function, decrease risk for falls, and improve QOL.     REQUIRES PT FOLLOW UP: Yes      PLAN OF CARE:  Plan  Times per week: 3-6  Current Treatment Recommendations: Strengthening, Functional Mobility Training, Neuromuscular Re-education, Home Exercise Program, Equipment Evaluation, Education, & procurement, Transfer Training, Gait Training, Safety Education & Training, Balance Training, Endurance Training, Patient/Caregiver Education & Training  Safety Devices  Type of devices: Call light within reach, Chair alarm in place, Nurse notified    Goals:  Patient goals : agreeable to PT POC  Short term goals  Short term goal 1: pt to tolerate standing >5 min with CGA at [employer] term goal 2: pt to be indep with HEP  Long term goals  Long term goal 1: indep with bed mobility  Long term goal 2: supervision with transfers  Long term goal 3: ambulate >50 ft supervision [facility] (6 CLICK) [address] Mobility Raw Score : 16     Therapy Time:   Individual   Time In 1110 Time Out 1132   Minutes 22       Bed mob/transfers: 11 min    Ned Padron PT, 06/07/19 at 2:48 PM

## 2019-06-07 NOTE — PROGRESS NOTES
Gaston Ward is a 80 y.o. male patient.     Current Facility-Administered Medications   Medication Dose Route Frequency Provider Last Rate Last Dose    [Held by provider] chlorproMAZINE (THORAZINE) tablet 10 mg  10 mg Oral TID PRN Amilcar Hester MD        cefTRIAXone (ROCEPHIN) 1 g IVPB in 50 mL D5W minibag  1 g Intravenous Q24H Kanika Katz DO   Stopped at 06/06/19 2230    polyethylene glycol (GLYCOLAX) packet 17 g  17 g Oral Daily Amilcar Hester MD   17 g at 06/07/19 0909    sennosides-docusate sodium (SENOKOT-S) 8.6-50 MG tablet 1 tablet  1 tablet Oral Daily Amilcar Hester MD   1 tablet at 06/05/19 0838    lactobacillus acidophilus [facility]) 4 tablet  4 tablet Oral TID Amilcar Hester MD   4 tablet at 06/06/19 2110    b complex-C-folic acid (NEPHROCAPS) capsule 1 mg  1 capsule Oral Daily Gretel Kelly MD   1 mg at 06/07/19 0910    amantadine (SYMMETREL) capsule 100 mg  100 mg Oral Daily Selene Alejandra MD   100 mg at 06/07/19 0910    pantoprazole (PROTONIX) tablet 40 mg  40 mg Oral Daily Selene Alejandra MD   40 mg at 06/07/19 0543    sodium chloride flush 0.9 % injection 10 mL  10 mL Intravenous 2 times per day Anni Parada MD   10 mL at 06/06/19 2110    sodium chloride flush 0.9 % injection 10 mL  10 mL Intravenous PRN Selene Alejandra MD   10 mL at 06/04/19 0645    magnesium hydroxide (MILK OF MAGNESIA) 400 MG/5ML suspension 30 mL  30 mL Oral Daily PRN Selene Alejandra MD        ondansetron [facility]) injection 4 mg  4 mg Intravenous Q6H PRN Selene Alejandra MD        acetaminophen (TYLENOL) tablet 650 mg  650 mg Oral Q4H PRN Selene Alejandra MD        heparin (porcine) injection 5,000 Units  5,000 Units Subcutaneous 3 times per day Anni Parada MD   Stopped at 06/06/19 1733    pravastatin (PRAVACHOL) tablet 80 mg  80 mg Oral Nightly Amilcar Hester MD   80 mg at 06/06/19 2109    glucose (GLUTOSE) 40 % oral gel 15 g  15 g Oral PRN Amilcar Hester MD  dextrose 50 % IV solution  12.5 g Intravenous PRN Maria G Salmeron MD        glucagon (rDNA) injection 1 mg  1 mg Intramuscular PRN Gretel Kelly MD        dextrose 5 % solution  100 mL/hr Intravenous PRN Maria G Salmeron MD        insulin lispro (HUMALOG) injection vial 0-6 Units  0-6 Units Subcutaneous TID WC Maria G Salmeron MD   1 Units at 06/05/19 1713    insulin lispro (HUMALOG) injection vial 0-3 Units  0-3 Units Subcutaneous Nightly Maria G Salmeron MD   1 Units at 06/05/19 2256     No Known Allergies  Principal Problem:    Sepsis due to urinary tract infection ([address]  Active Problems:    Atrial fibrillation ([address]    AICD (automatic cardioverter/defibrillator) present    Complicated UTI (urinary tract infection)    Chronic renal insufficiency, stage III (moderate) ([facility])    Acute kidney injury ([address]    Pyelonephritis    Hydronephrosis, left    Left ureteral calculus    Gram negative sepsis ([facility]  Resolved Problems:    * No resolved hospital problems. *    Blood pressure 123/73, pulse 60, temperature 97.7 °F (36.5 °C), resp. rate 9, height 6' 2\" (1.88 m), weight 264 lb (119.7 kg), SpO2 95 %. Subjective:  Symptoms:  Improved. No shortness of breath, malaise, chest pain, weakness, headache, chest pressure, anorexia, diarrhea or anxiety. (No acute events overnight. Afebrile  Telemetry reviewed. No new complaints. Pt denies chest pain, SOB, N/V, fevers or chills. ). Diet:  Adequate intake. No nausea or vomiting. Activity level: Impaired due to weakness. Pain:  He reports no pain. Objective:  General Appearance:  Ill-appearing. Vital signs: (most recent): Blood pressure 123/73, pulse 60, temperature 97.7 °F (36.5 °C), resp. rate 9, height 6' 2\" (1.88 m), weight 264 lb (119.7 kg), SpO2 95 %. No fever. Output: Minimal urine output. HEENT: Normal HEENT exam.    Lungs:  Normal effort and normal respiratory rate. Breath sounds clear to auscultation.     Heart: Normal rate.  Regular rhythm. S1 normal and S2 normal.    Abdomen: Abdomen is soft. Bowel sounds are normal.   There is generalized tenderness. Extremities: Decreased range of motion. Pulses: Distal pulses are intact. Neurological: Patient is alert. Pupils:  Pupils are equal, round, and reactive to light. Skin:  Warm and dry. Assessment & Plan     GNR bacteremia with CHAYA, 2/2 to UTI with pyuria. ID on board. Started on Cefepime. Repeat Blood cultures. CHAYA on CKD stage III: decreased urine output. Left side hydronephritis causing obstructive nephropathy. Dr. Jenna Paiz on consult and placed left double-J stent. Urine output has improved. Continue to monitor output and BMP.      Hyponatremia: hold fluids until urine output increases    DMII with hyperglycemia: ISS, hypoglycemia protocol POCT Glucose TIDAC & QHS         TAJ Gomez - CNP  6/7/2019

## 2019-06-07 NOTE — PROGRESS NOTES
Nutrition Assessment    Type and Reason for Visit: Reassess    Nutrition Recommendations: Carb Control 5. Continue frozen ONS TID    Nutrition Assessment: Pt at risk for malnutrition due to poor oral intake. Will continue nutrition supplements and monitor    Malnutrition Assessment:  · Malnutrition Status: Insufficient data  · Context: Acute illness or injury  · Findings of the 6 clinical characteristics of malnutrition (Minimum of 2 out of 6 clinical characteristics is required to make the diagnosis of moderate or severe Protein Calorie Malnutrition based on AND/ASPEN Guidelines):  1. Energy Intake-Unable to assess, Unable to assess    2. Weight Loss-Unable to assess, unable to assess  3. Fat Loss-Unable to assess,    4. Muscle Loss-Unable to assess,    5. Fluid Accumulation-No significant fluid accumulation,    6.  Strength-Not measured    Nutrition Risk Level: High    Nutrient Needs:  · Estimated Daily Total Kcal: 9023-7484 (kg 20-22)  · Estimated Daily Protein (g): 111-120 (kg IBW x 1.3-1.4)  · Estimated Daily Total Fluid (ml/day): ~2000 ml (1 ml/kcal)    Nutrition Diagnosis:   · Problem: Inadequate oral intake  · Etiology: related to (current condition)     Signs and symptoms:  as evidenced by Intake 0-25%    Objective Information:  · Nutrition-Focused Physical Findings: oriented to self only. BM 6/7. Peripheral line. IVF stopped. 1+ BLE edema.  PMH: DM2, CKD, CHF  · Wound Type: None  · Current Nutrition Therapies:  · Oral Diet Orders: ('Carb Control')   · Oral Diet intake: 1-25%  · Oral Nutrition Supplement (ONS) Orders: Frozen Oral Supplement(TID)  · ONS intake:  Unable to determine  · Anthropometric Measures:  · Ht: 6' 2\" (188 cm)   · Current Body Wt: 264 lb (119.7 kg)(? accurate, 40# wt difference from 6/4. pt up in chair, bed zeroed)  · Admission Body Wt: 225 lb (102.1 kg)(bedscale, 215 lb stated)  · Usual Body Wt: 230 lb (104.3 kg)(variable weight ranges in EMR)  · % Weight Change:  ,  UTD with wide range of weight histories  · Ideal Body Wt: 190 lb (86.2 kg), % Ideal Body >100%  · BMI Classification: BMI 25.0 - 29.9 Overweight    Nutrition Interventions:   Continue current diet(Carb Control 5.  Continue frozen ONS TID)  Continued Inpatient Monitoring, Education Not Indicated    Nutrition Evaluation:   · Evaluation: Goals set   · Goals: po intake > 75% of meals and supplements    · Monitoring: Meal Intake, Supplement Intake, Weight, Pertinent Labs      Electronically signed by Naida Halsted, RD, LD on 6/7/19 at 11:57 AM

## 2019-06-07 NOTE — PROGRESS NOTES
Gaston Ward  8/29/1931  male  Medical Record Number: [id number]    Patient informed that I am an Infectious Disease physician and permission obtained from the patient to speak in front of any visitors prior to any discussion for HIPPA purposes. HPI: Patient with GNR bacteremia ( Klebsiella pneumoniae) , decreased urine output with UTI klebsiella pneumoniae, increasing weakness. + nausea  Hx of CHF, AF, T2DM, HTN, CAD  + BC 2/2 Klebsiella pneumoniae - multi drug sensitive. Went for CYSTOSCOPY, LEFT DOUBLE J STENT INSERTION    Sitting in chair today. Has multiple questions that are meant for urology. Will defer to urology for assistance.      Past Medical History:   Diagnosis Date    Anticoagulant long-term use     xarelto    Atrial fibrillation (HCC)     Bell's palsy     BPH (benign prostatic hyperplasia)     CAD (coronary artery disease)     Cardiomyopathy (HCC)     Chronic kidney disease     Hyperlipidemia     Hypertension     Osteoarthritis     Type II or unspecified type diabetes mellitus without mention of complication, not stated as uncontrolled        Past Surgical History:   Procedure Laterality Date    CORONARY ANGIOPLASTY      CYSTOSCOPY INSERTION / REMOVAL STENT / STONE Left 6/6/2019    CYSTOSCOPY, LEFT DOUBLE J STENT INSERTION performed by Zack Dooley MD at [facility] CATH LAB PROCEDURE      EYE SURGERY Bilateral 03/2018    PACEMAKER INSERTION N/A 1994    SKIN CANCER EXCISION  2016    SKIN CANCER EXCISION         Social History     Socioeconomic History    Marital status:      Spouse name: Not on file    Number of children: Not on file    Years of education: Not on file    Highest education level: Not on file   Occupational History    Not on file   Social Needs    Financial resource strain: Not on file    Food insecurity:     Worry: Not on file     Inability: Not on file    Transportation needs:     Medical: Not on file     Non-medical: Not on file   Tobacco Use    Smoking status: Never Smoker    Smokeless tobacco: Never Used   Substance and Sexual Activity    Alcohol use: No     Alcohol/week: 0.0 oz    Drug use: No    Sexual activity: Not on file   Lifestyle    Physical activity:     Days per week: Not on file     Minutes per session: Not on file    Stress: Not on file   Relationships    Social connections:     Talks on phone: Not on file     Gets together: Not on file     Attends [employer] service: Not on file     Active member of club or organization: Not on file     Attends meetings of clubs or organizations: Not on file     Relationship status: Not on file    Intimate partner violence:     Fear of current or ex partner: Not on file     Emotionally abused: Not on file     Physically abused: Not on file     Forced sexual activity: Not on file   Other Topics Concern    Not on file   Social History Narrative    Not on file       Family History   Problem Relation Age of Onset    Cancer Mother     Coronary Art Dis Father     Coronary Art Dis Brother     Hypertension Brother     High Cholesterol Brother     Diabetes Brother        No current facility-administered medications on file prior to encounter.       Current Outpatient Medications on File Prior to Encounter   Medication Sig Dispense Refill    SITagliptin (JANUVIA) 50 MG tablet Take 50 mg by mouth daily      amantadine (SYMMETREL) 100 MG capsule Take 100 mg by mouth daily      carvedilol (COREG) 12.5 MG tablet Take 12.5 mg by mouth 2 times daily (with meals)      glipiZIDE (GLUCOTROL) 5 MG tablet Take 2.5 mg by mouth daily      pantoprazole (PROTONIX) 40 MG tablet Take 40 mg by mouth daily      furosemide (LASIX) 20 MG tablet Take 1 tablet by mouth daily 30 tablet 3    blood glucose monitor strips by Other route 4 times daily 400 strip 2    CPAP Machine MISC by Does not apply route New Auto CPAP 8-20 cm with Standout Jobs Simplus, Size L 1 each 0    Respiratory Therapy Supplies JOE New CPAP mask and supplies 1 Device 0    glucose blood VI test strips (EXACTECH TEST) strip 1 each by In Vitro route 4 times daily As needed. 100 each 11    pravastatin (PRAVACHOL) 80 MG tablet Take 80 mg by mouth daily. No changes on exam - going to surgery    Labs: I have reviewed all lab results by electronic record, including most recent CBC, metabolic panel, and pertinent abnormalities were addressed from an infectious disease perspective. WBC trends are being monitored. Lab Results   Component Value Date     06/07/2019    K 4.3 06/07/2019    K 4.2 06/05/2019     06/07/2019    CO2 15 06/07/2019    BUN 62 06/07/2019    CREATININE 3.13 06/07/2019    GLUCOSE 118 06/07/2019    GLUCOSE 141 12/30/2011    CALCIUM 7.2 06/07/2019      Lab Results   Component Value Date    WBC 11.6 (H) 06/07/2019    HGB 9.2 (L) 06/07/2019    HCT 28.5 (L) 06/07/2019    MCV 91.6 06/07/2019     06/07/2019       Radiology:   I have reviewed imaging results per electronic record and most pertinent abnormalities are being addressed from an infectious disease standpoint. Assessment:  GNR sepsis with bacteremia secondary to UTI ( GNR )   Klebsiella pneumoniae - multi drug sensitive. Pyuria and microscopic hematuria  Dehydration and CHAYA on CKD 3  DM2 - uncontrolled  Urinary retention/ hx of BPH    Plan:  Repeat blood cultures negative as of 6/5/2019. Will need 2 weeks of Rocephin. Optimal glycemic control  Supportive care   Patient has stent now. Script in chart.        Kanika Katz D.O.

## 2019-06-07 NOTE — PROGRESS NOTES
Physical Therapy Missed Treatment   Facility/Department: [facility] MED SURG [id number]    NAME: Katy Weir    : 1931 (80 y.o.)  MRN: [id number]    Account: [de-identified]  Gender: male      PT evaluation and treatment orders received. Chart reviewed. PT evaluation attempted. Pt resting in bed with full breakfast tray in front of him. Pleasantly requesting PT return later. Will follow and attempt PT evaluation again at earliest availability.         Miriam Carter, PT, 19 at 9:28 AM

## 2019-06-07 NOTE — PROGRESS NOTES
arteriosclerosis in native artery     Corneal epithelial dystrophy     Hypercholesterolemia     Tear film insufficiency, unspecified     Type 2 diabetes mellitus ([facility]     Acute kidney injury ([facility]     Long term current use of anticoagulant therapy     Atrial fibrillation (HCC)     Benign prostatic hyperplasia     Sepsis due to urinary tract infection (HCC)      Subjective:  Admit Date: 6/4/2019    Interval History: s/p cysto yesterday w/ stent placement, renal function improving, still on fluids     Medications:  Scheduled Meds:   cefTRIAXone (ROCEPHIN) IV  1 g Intravenous Q24H    polyethylene glycol  17 g Oral Daily    sennosides-docusate sodium  1 tablet Oral Daily    lactobacillus acidophilus  4 tablet Oral TID    b complex-C-folic acid  1 capsule Oral Daily    amantadine  100 mg Oral Daily    pantoprazole  40 mg Oral Daily    sodium chloride flush  10 mL Intravenous 2 times per day    heparin (porcine)  5,000 Units Subcutaneous 3 times per day    pravastatin  80 mg Oral Nightly    insulin lispro  0-6 Units Subcutaneous TID WC    insulin lispro  0-3 Units Subcutaneous Nightly     Continuous Infusions:   sodium chloride 1,000 mL (06/07/19 0237)    dextrose         CBC:   Recent Labs     06/06/19  1119 06/07/19  0614   WBC 13.1* 11.6*   HGB 9.3* 9.2*    151     CMP:    Recent Labs     06/05/19  0721 06/06/19  1119 06/07/19  0614   * 133* 131*   K 4.2 4.5 4.3   CL 96 98 102   CO2 20 18* 15*   BUN 57* 67* 62*   CREATININE 3.66* 3.54* 3.13*   GLUCOSE 115* 127* 118*   CALCIUM 8.2* 7.6* 7.2*   LABGLOM 15.8* 16.4* 18.9*     Troponin: No results for input(s): TROPONINI in the last 72 hours. BNP: No results for input(s): BNP in the last 72 hours. INR: No results for input(s): INR in the last 72 hours. Lipids: No results for input(s): CHOL, LDLDIRECT, TRIG, HDL, AMYLASE, LIPASE in the last 72 hours.   Liver:   No results for input(s): AST, ALT, ALKPHOS, PROT, LABALBU, BILITOT in the last 72 hours.    Invalid input(s): BILDIR  Iron:  No results for input(s): IRONS, FERRITIN in the last 72 hours. Invalid input(s): LABIRONS  Urinalysis: No results for input(s): UA in the last 72 hours.     Objective:  Vitals: /70   Pulse 60   Temp 97.9 °F (36.6 °C) (Temporal)   Resp 9   Ht 6' 2\" (1.88 m)   Wt 264 lb (119.7 kg)   SpO2 94%   BMI 33.90 kg/m²    Wt Readings from Last 3 Encounters:   06/07/19 264 lb (119.7 kg)   05/28/19 218 lb (98.9 kg)   05/22/19 225 lb (102.1 kg)      24HR INTAKE/OUTPUT:      Intake/Output Summary (Last 24 hours) at 6/7/2019 1124  Last data filed at 6/7/2019 3438  Gross per 24 hour   Intake 2320 ml   Output 1100 ml   Net 1220 ml     General: alert, in no apparent distress  HEENT: normocephalic, atraumatic, anicteric  Neck: supple, no mass  Lungs: non-labored respirations, clear to auscultation bilaterally  Heart: regular rate and rhythm, no murmurs or rubs  Abdomen: soft, non-tender, non-distended  MSK: no joint swelling or tenderness  : + hansen catheter, no CVA tenderness   Ext: no cyanosis, no peripheral edema  Neuro: alert and oriented, no gross abnormalities  Psych: normal mood and affect          Electronically signed by Charly Doyle MD

## 2019-06-07 NOTE — CARE COORDINATION
Per family request, referrals made to [facility]. [facility] does not currently have a bed available, but they are following as one may open up soon AND this is 1st choice. [facility] is reviewing the case. [facility] can accept pt. [facility] may have a bed open on Monday.

## 2019-06-07 NOTE — PROGRESS NOTES
Patient was refusing his meds this morning, had to convince him to take the most important 3, which was not easy, but he finally agreed but refused the rest.  Administered pills with applesauce and patient did well. Will continue to monitor.

## 2019-06-07 NOTE — PROGRESS NOTES
Got patient up to chair with PT and patient did decent with the walker, was incontinent of stool when he stood up from the bed, was quite embarassed. Was reassured and is currently still sitting in the chair. Patient is confused and only oriented to self currently. Will continue to monitor.

## 2019-06-07 NOTE — PROGRESS NOTES
Patient was only alert to self when I came on this morning. Patient is now alert to 3 knowing year, month, his name and knowing he is in a hospital but unsure which one.

## 2019-06-08 NOTE — PROGRESS NOTES
insufficiency, unspecified     Type 2 diabetes mellitus ([facility]     Acute kidney injury ([facility]     Long term current use of anticoagulant therapy     Atrial fibrillation (HCC)     Benign prostatic hyperplasia     Sepsis due to urinary tract infection (HCC)      Subjective:  Admit Date: 6/4/2019    Interval History: renal function continues to improve, pt has no complaints this AM, no acute overnight events     Medications:  Scheduled Meds:   cefTRIAXone (ROCEPHIN) IV  1 g Intravenous Q24H    polyethylene glycol  17 g Oral Daily    sennosides-docusate sodium  1 tablet Oral Daily    lactobacillus acidophilus  4 tablet Oral TID    b complex-C-folic acid  1 capsule Oral Daily    amantadine  100 mg Oral Daily    pantoprazole  40 mg Oral Daily    sodium chloride flush  10 mL Intravenous 2 times per day    heparin (porcine)  5,000 Units Subcutaneous 3 times per day    pravastatin  80 mg Oral Nightly    insulin lispro  0-6 Units Subcutaneous TID WC    insulin lispro  0-3 Units Subcutaneous Nightly     Continuous Infusions:   dextrose         CBC:   Recent Labs     06/07/19  0614 06/08/19  0742   WBC 11.6* 13.0*   HGB 9.2* 10.1*    213     CMP:    Recent Labs     06/06/19  1119 06/07/19  0614 06/08/19  0742   * 131* 136   K 4.5 4.3 4.2   CL 98 102 105   CO2 18* 15* 18*   BUN 67* 62* 54*   CREATININE 3.54* 3.13* 2.53*   GLUCOSE 127* 118* 116*   CALCIUM 7.6* 7.2* 7.8*   LABGLOM 16.4* 18.9* 24.2*     Troponin: No results for input(s): TROPONINI in the last 72 hours. BNP: No results for input(s): BNP in the last 72 hours. INR: No results for input(s): INR in the last 72 hours. Lipids: No results for input(s): CHOL, LDLDIRECT, TRIG, HDL, AMYLASE, LIPASE in the last 72 hours. Liver:   No results for input(s): AST, ALT, ALKPHOS, PROT, LABALBU, BILITOT in the last 72 hours. Invalid input(s): BILDIR  Iron:  No results for input(s): IRONS, FERRITIN in the last 72 hours.     Invalid input(s):

## 2019-06-08 NOTE — PROGRESS NOTES
PM assessment completed. Pt was only orientated to himself. VS stable. LS clear. When giving him his heparin, he asked me how I got into his house. Pt was reorientated that he was in the hospital. Huynh is intact and draining. Pt has denied any N/V, hiccups or pain. Pt had some difficulty swallowing his pills and said he has had an issue with doing so for \"awhile now\". Call light within reach. Denies any needs.  Will continue to monitor Electronically signed by Britany Ku RN on 6/8/19 at 3:25 AM

## 2019-06-08 NOTE — PROGRESS NOTES
Roshan Loya  8/29/1931  male  Medical Record Number: [id number]    Patient informed that I am an Infectious Disease physician and permission obtained from the patient to speak in front of any visitors prior to any discussion for HIPPA purposes. HPI: Patient with GNR bacteremia ( Klebsiella pneumoniae) , decreased urine output with UTI klebsiella pneumoniae, increasing weakness. + nausea  Hx of CHF, AF, T2DM, HTN, CAD  + BC 2/2 Klebsiella pneumoniae - multi drug sensitive.      Went for CYSTOSCOPY, LEFT DOUBLE J STENT INSERTION    Resting      Past Medical History:   Diagnosis Date    Anticoagulant long-term use     xarelto    Atrial fibrillation (HCC)     Bell's palsy     BPH (benign prostatic hyperplasia)     CAD (coronary artery disease)     Cardiomyopathy (HCC)     Chronic kidney disease     Hyperlipidemia     Hypertension     Osteoarthritis     Type II or unspecified type diabetes mellitus without mention of complication, not stated as uncontrolled        Past Surgical History:   Procedure Laterality Date    CORONARY ANGIOPLASTY      CYSTOSCOPY INSERTION / REMOVAL STENT / STONE Left 6/6/2019    CYSTOSCOPY, LEFT DOUBLE J STENT INSERTION performed by Neela Mccauley MD at [facility] CATH LAB PROCEDURE      EYE SURGERY Bilateral 03/2018    PACEMAKER INSERTION N/A 1994    SKIN CANCER EXCISION  2016    SKIN CANCER EXCISION         Social History     Socioeconomic History    Marital status:      Spouse name: Not on file    Number of children: Not on file    Years of education: Not on file    Highest education level: Not on file   Occupational History    Not on file   Social Needs    Financial resource strain: Not on file    Food insecurity:     Worry: Not on file     Inability: Not on file    Transportation needs:     Medical: Not on file     Non-medical: Not on file   Tobacco Use    Smoking status: Never Smoker    Smokeless tobacco: Never Used   Substance and Sexual Activity    Alcohol use: No     Alcohol/week: 0.0 oz    Drug use: No    Sexual activity: Not on file   Lifestyle    Physical activity:     Days per week: Not on file     Minutes per session: Not on file    Stress: Not on file   Relationships    Social connections:     Talks on phone: Not on file     Gets together: Not on file     Attends [employer] service: Not on file     Active member of club or organization: Not on file     Attends meetings of clubs or organizations: Not on file     Relationship status: Not on file    Intimate partner violence:     Fear of current or ex partner: Not on file     Emotionally abused: Not on file     Physically abused: Not on file     Forced sexual activity: Not on file   Other Topics Concern    Not on file   Social History Narrative    Not on file       Family History   Problem Relation Age of Onset    Cancer Mother     Coronary Art Dis Father     Coronary Art Dis Brother     Hypertension Brother     High Cholesterol Brother     Diabetes Brother        No current facility-administered medications on file prior to encounter.       Current Outpatient Medications on File Prior to Encounter   Medication Sig Dispense Refill    SITagliptin (JANUVIA) 50 MG tablet Take 50 mg by mouth daily      amantadine (SYMMETREL) 100 MG capsule Take 100 mg by mouth daily      carvedilol (COREG) 12.5 MG tablet Take 12.5 mg by mouth 2 times daily (with meals)      glipiZIDE (GLUCOTROL) 5 MG tablet Take 2.5 mg by mouth daily      pantoprazole (PROTONIX) 40 MG tablet Take 40 mg by mouth daily      furosemide (LASIX) 20 MG tablet Take 1 tablet by mouth daily 30 tablet 3    blood glucose monitor strips by Other route 4 times daily 400 strip 2    CPAP Machine MISC by Does not apply route New Auto CPAP 8-20 cm with Medical Referral Source [employer], Size L 1 each 0    Respiratory Therapy Supplies JOE New CPAP mask and supplies 1 Device 0    glucose blood VI test strips (EXACTECH TEST) strip 1 each by In Vitro route 4 times daily As needed. 100 each 11    pravastatin (PRAVACHOL) 80 MG tablet Take 80 mg by mouth daily. Resting, NAD  Neck supple  Lungs clear  Abd soft    Labs: I have reviewed all lab results by electronic record, including most recent CBC, metabolic panel, and pertinent abnormalities were addressed from an infectious disease perspective. WBC trends are being monitored. Lab Results   Component Value Date     06/08/2019    K 4.2 06/08/2019    K 4.2 06/05/2019     06/08/2019    CO2 18 06/08/2019    BUN 54 06/08/2019    CREATININE 2.53 06/08/2019    GLUCOSE 116 06/08/2019    GLUCOSE 141 12/30/2011    CALCIUM 7.8 06/08/2019      Lab Results   Component Value Date    WBC 13.0 (H) 06/08/2019    HGB 10.1 (L) 06/08/2019    HCT 30.5 (L) 06/08/2019    MCV 90.5 06/08/2019     06/08/2019       Radiology:   I have reviewed imaging results per electronic record and most pertinent abnormalities are being addressed from an infectious disease standpoint. Assessment:  GNR sepsis with bacteremia secondary to UTI ( GNR )   Klebsiella pneumoniae - multi drug sensitive. Pyuria and microscopic hematuria  Dehydration and CHAYA on CKD 3  DM2 - uncontrolled  Urinary retention/ hx of BPH    GNR 6/6/2019 <25,000 on UA. Plan:  Repeat blood cultures negative as of 6/5/2019. Will need 2 weeks of Rocephin. Optimal glycemic control  Supportive care   Patient has stent now. Script in chart.        Kanika Katz D.O.

## 2019-06-08 NOTE — PROGRESS NOTES
Patient much more alert and oriented today than yesterday. Patient ate around close to 100% of his breakfast and verbalized how much he liked it. Oriented x4, but still gets confused at times. Will continue to monitor.

## 2019-06-08 NOTE — PROGRESS NOTES
Hospitalist Progress Note  6/8/2019 10:59 AM    Assessment and Plan:   1. Gram negative ( Klebsiella pneu) Septicemia (WBC> 12 RR> 20) with CHAYA due to UTI with Pyuria and microscopic hematuria,Complicated by ureteral obstruction present on admission s/p Left sidde double J stent placement : IV cefepime discontinued and changed to rocephin Consulted ID for Gram negative Bacteremia. pansensitive cultures/ sensitivities. IVFs, Repeat CBC in AM. Noted increased leukocytosis. Repeat cultures NGTD  2.  Dehydration with Hyponatremia and Hypotension due to Hypovolemia: IVF, Nephro on board. Repeat Labs, strict intake output. Daily weights, hold Lasix. Hold BP lowering agentsHolding BP lowering agents  3. CHAYA on CKD III likely due to post obstructive and ATN (Baseline Cr 1.41 was 2.87) present on admission:  Nephro on board. Avoid nephrotoxic agents wherever possible. Gentle IVF. Repeat BMP in AM. Strict Intake output. Avoiding fluctuations in BP. 4. DMII with hyperglycemia: ISS, hypoglycemia protocol POCT Glucose TIDAC & QHS  5. A fib: rate controlled. Goal K and Mg 4.0 and 2.0, respectively. 6. CAD : Resume home meds. 7.  Hx of ICH with residual deficits: Neurochecks, monitor for changes in neuro exam. Currently at baseline. Reorient PRN. Fall precautions aspiration precautions. 8. Urinary retention likely worsened with Oxybutynin in addition to chronic urinary retention due to BPH should have been on Flomax which was prescribed by Urology in the past not sure why this was [facility]. Discontinued Oxybutynin. Consulting Urology. Huynh in place, Holding flomax to avoid orthostatic hypotension. Needs to work with PT OT, will defer meds to Urology. Poor Urinary output,  1. Bowel Regimen and GI PPx: stool softners PRN ordered with hold parameters for loose stools or diarrhea. On antiacid  2. Prolonged QTc: Goal K and Mg 4.0 and 2.0 respectively. Discontinued QT prolonging agents wherever possible.  Repeat EKG in AM. appreciated  Abdomen: (+) BS, soft, non-tender; non distended no guarding or rigidity. Extremities:  no cyanosis, no edema bilat lower exts, no calf tenderness bilaterally.  Dry skin noted       Medications:      dextrose        cefTRIAXone (ROCEPHIN) IV  1 g Intravenous Q24H    polyethylene glycol  17 g Oral Daily    sennosides-docusate sodium  1 tablet Oral Daily    lactobacillus acidophilus  4 tablet Oral TID    b complex-C-folic acid  1 capsule Oral Daily    amantadine  100 mg Oral Daily    pantoprazole  40 mg Oral Daily    sodium chloride flush  10 mL Intravenous 2 times per day    heparin (porcine)  5,000 Units Subcutaneous 3 times per day    pravastatin  80 mg Oral Nightly    insulin lispro  0-6 Units Subcutaneous TID WC    insulin lispro  0-3 Units Subcutaneous Nightly       LABS Reviewed    IMAGING Reviewed    Jose Alfredo Joseph MD  [facility] Hospitalist

## 2019-06-09 PROBLEM — A41.59 KLEBSIELLA SEPSIS (HCC): Status: ACTIVE | Noted: 2019-01-01

## 2019-06-09 PROBLEM — N17.9 ACUTE KIDNEY INJURY (HCC): Status: RESOLVED | Noted: 2019-01-01 | Resolved: 2019-01-01

## 2019-06-09 NOTE — PROGRESS NOTES
Nephrology Progress Note    Assessment:  80y.o. year old male with history s/f T2DM, HTN, CHF, A.fib, HTN and CAD who presented for weakness, decreased UOP     1. CHAYA on CKD stage III: possibly 2/2 LT sided hydronephrosis causing obstructive nephropathy (stone related vs. BPH)  2. CKD stage III: baseline Scr ~1.7-1.9 w/ eGFR mid/high 30s, risk factors include T2DM, HTN, CAD, CHF, ?obstructive nephropathy (has several renal stones)   3. Hyponatremia: resolved   4. Klebsiella PNA bacteremia: on abx     Plan:  - continue monitoring renal function, improving  - can give fluids if polyuric (>3L/24 hours)  - ok to d/c when ok with other teams  - has appt w/ Dr. Jet Roth already scheduled for mid June      Thank you for the consultation. Will continue to follow  Please do not hesitate to call with questions.       Patient Active Problem List:     Type II diabetes mellitus, uncontrolled (HCC)     CHF (congestive heart failure)     BPH (benign prostatic hyperplasia)     Hypercholesteremia     CKD (chronic kidney disease) stage 3, GFR 30-59 ml/min (HCC)     Dystrophy of anterior cornea     Acute sinusitis     Basal cell carcinoma of nose     Retained foreign body of eyelid     Tear film insufficiency     Hypertension     Atrial fibrillation (HCC)     Anticoagulant long-term use     Cardiomyopathy ([address]     AICD (automatic cardioverter/defibrillator) present     DCM (dilated cardiomyopathy) ([address]     Coronary artery disease involving native coronary artery of native heart with angina pectoris ([address]     CHAYA (acute kidney injury) ([address]     UTI (urinary tract infection)     Anticoagulated     Aphasia     Bacteriuria     Encephalopathy     Left-sided Bell's palsy     Leukocytosis     Subdural hematoma (HCC)     Type 2 diabetes mellitus with hyperglycemia (HCC)     Implantable cardioverter-defibrillator (ICD) in situ     Dilated cardiomyopathy (HCC)     Congestive heart failure (HCC)     Chronic renal insufficiency, stage III (moderate) (HCC)     Coronary arteriosclerosis in native artery     Corneal epithelial dystrophy     Hypercholesterolemia     Tear film insufficiency, unspecified     Type 2 diabetes mellitus ([facility]     Acute kidney injury ([facility]     Long term current use of anticoagulant therapy     Atrial fibrillation (HCC)     Benign prostatic hyperplasia     Sepsis due to urinary tract infection (HCC)      Subjective:  Admit Date: 6/4/2019    Interval History: renal function continues to improve, no acute overnight events    Medications:  Scheduled Meds:   cefTRIAXone (ROCEPHIN) IV  1 g Intravenous Q24H    polyethylene glycol  17 g Oral Daily    sennosides-docusate sodium  1 tablet Oral Daily    lactobacillus acidophilus  4 tablet Oral TID    b complex-C-folic acid  1 capsule Oral Daily    amantadine  100 mg Oral Daily    pantoprazole  40 mg Oral Daily    sodium chloride flush  10 mL Intravenous 2 times per day    heparin (porcine)  5,000 Units Subcutaneous 3 times per day    pravastatin  80 mg Oral Nightly    insulin lispro  0-6 Units Subcutaneous TID WC    insulin lispro  0-3 Units Subcutaneous Nightly     Continuous Infusions:   dextrose         CBC:   Recent Labs     06/08/19  0742 06/09/19  0709   WBC 13.0* 11.4*   HGB 10.1* 10.2*    256     CMP:    Recent Labs     06/07/19  0614 06/08/19  0742 06/09/19  0709   * 136 137   K 4.3 4.2 4.1    105 104   CO2 15* 18* 20   BUN 62* 54* 45*   CREATININE 3.13* 2.53* 2.24*   GLUCOSE 118* 116* 111*   CALCIUM 7.2* 7.8* 7.7*   LABGLOM 18.9* 24.2* 27.8*     Troponin: No results for input(s): TROPONINI in the last 72 hours. BNP: No results for input(s): BNP in the last 72 hours. INR: No results for input(s): INR in the last 72 hours. Lipids: No results for input(s): CHOL, LDLDIRECT, TRIG, HDL, AMYLASE, LIPASE in the last 72 hours. Liver:   No results for input(s): AST, ALT, ALKPHOS, PROT, LABALBU, BILITOT in the last 72 hours.     Invalid input(s): BILDIR  Iron:  No results for input(s): IRONS, FERRITIN in the last 72 hours. Invalid input(s): LABIRONS  Urinalysis: No results for input(s): UA in the last 72 hours.     Objective:  Vitals: BP (!) 162/74   Pulse 58   Temp 97.7 °F (36.5 °C)   Resp 18   Ht 6' 2\" (1.88 m)   Wt 264 lb (119.7 kg)   SpO2 95%   BMI 33.90 kg/m²    Wt Readings from Last 3 Encounters:   06/07/19 264 lb (119.7 kg)   05/28/19 218 lb (98.9 kg)   05/22/19 225 lb (102.1 kg)      24HR INTAKE/OUTPUT:      Intake/Output Summary (Last 24 hours) at 6/9/2019 1203  Last data filed at 6/9/2019 0551  Gross per 24 hour   Intake 838 ml   Output 2750 ml   Net -1912 ml     General: alert, in no apparent distress  HEENT: normocephalic, atraumatic, anicteric  Neck: supple, no mass  Lungs: non-labored respirations, clear to auscultation bilaterally  Heart: regular rate and rhythm, no murmurs or rubs  Abdomen: soft, non-tender, non-distended  MSK: no joint swelling or tenderness  : + hansen catheter, no CVA tenderness   Ext: no cyanosis, no peripheral edema  Neuro: alert and oriented, no gross abnormalities  Psych: normal mood and affect    Electronically signed by Maykel Barr MD

## 2019-06-09 NOTE — DISCHARGE INSTR - COC
Continuity of Care Form    Patient Name: Katy Weir   :  1931  MRN:  [id number]    [address] [place] [address] date:  2019  Discharge date:  6/10/19    Code Status Order: Full Code   Advance Directives:     Admitting Physician:  Tracy Stone MD  PCP: James Flores MD    Discharging Nurse: [facility] Unit/Room#: J945/I677-41  Discharging Unit Phone Number: [phone]    Emergency Contact:   Extended Emergency Contact Information  Primary Emergency Contact: Bryce Madsen  Address: [address] [place] [address], [address] [place] [address] Phone: [phone]  Work Phone: [phone]  Mobile Phone: [phone]  Relation: Spouse  Secondary Emergency Contact: Kassi Sainz [address] Phone: [phone]  Work Phone: [phone]  Mobile Phone: [phone]  Relation: Child    Past Surgical History:  Past Surgical History:   Procedure Laterality Date    CORONARY ANGIOPLASTY      CYSTOSCOPY INSERTION / Roney Bel / STONE Left 2019    CYSTOSCOPY, LEFT DOUBLE J STENT INSERTION performed by Lea Lopes MD at [address] [place] [address] CATH LAB PROCEDURE      EYE SURGERY Bilateral 2018    PACEMAKER INSERTION N/A     SKIN CANCER EXCISION      SKIN CANCER EXCISION         Immunization History: There is no immunization history on file for this patient.     Active Problems:  Patient Active Problem List   Diagnosis Code    Type II diabetes mellitus, uncontrolled ([facility]) E11.65    CHF (congestive heart failure) I50.9    BPH (benign prostatic hyperplasia) N40.0    Hypercholesteremia E78.00    CKD (chronic kidney disease) stage 3, GFR 30-59 ml/min ([facility]) N18.3    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    Acute sinusitis J01.90    Basal cell carcinoma of nose C44.311    Retained foreign body of eyelid H02.819, Z18.9    Tear film insufficiency H04.129    Hypertension I10    Atrial fibrillation ([facility]) I48.91    Anticoagulant long-term use Z79.01    Cardiomyopathy ([facility] I42.9    AICD (automatic cardioverter/defibrillator) present Z95.810    DCM (dilated cardiomyopathy) ([facility] I42.0    Coronary artery disease involving native coronary artery of native heart with angina pectoris ([facility]) I25.119    CHAYA (acute kidney injury) ([facility] U93.7    Complicated UTI (urinary tract infection) N39.0    Anticoagulated Z79.01    Aphasia R47.01    Bacteriuria R82.71    Encephalopathy G93.40    Left-sided Bell's palsy G51.0    Leukocytosis D72.829    Subdural hematoma (HCC) S06.5X9A    Type 2 diabetes mellitus with hyperglycemia ([facility]) E11.65    Implantable cardioverter-defibrillator (ICD) in situ Z95.810    Dilated cardiomyopathy (HCC) I42.0    Congestive heart failure ([facility]) I50.9    CKD (chronic kidney disease) stage 4, GFR 15-29 ml/min ([facility]) N18.4    Coronary arteriosclerosis in native artery I25.10    Corneal epithelial dystrophy H18.52    Hypercholesterolemia E78.00    Tear film insufficiency, unspecified H04.129    Type 2 diabetes mellitus ([facility]) E11.9    Long term current use of anticoagulant therapy Z79.01    Atrial fibrillation ([facility]) I48.91    Benign prostatic hyperplasia N40.0    Sepsis due to urinary tract infection ([facility]) A41.9, N39.0    Pyelonephritis N12    Hydronephrosis, left N13.30    Left ureteral calculus N20.1    Klebsiella sepsis ([facility]) A41.4       Isolation/Infection:   Isolation          No Isolation            Nurse Assessment:  Last Vital Signs: BP (!) 155/90   Pulse 60   Temp 97.3 °F (36.3 °C)   Resp 18   Ht 6' 2\" (1.88 m)   Wt 264 lb (119.7 kg)   SpO2 100%   BMI 33.90 kg/m²     Last documented pain score (0-10 scale): Pain Level: 0  Last Weight:   Wt Readings from Last 1 Encounters:   06/07/19 264 lb (119.7 kg)     Mental Status:  oriented and alert    IV Access:  - PICC - site  R Upper Arm, insertion date: 6/10/19    Nursing Mobility/ADLs:  Walking   Assisted  Transfer  Assisted  Bathing  Assisted  Dressing Dependent  Toileting  Assisted  Feeding  Independent  Med Admin  Independent  Med Delivery   whole    Wound Care Documentation and Therapy:        Elimination:  Continence:   · Bowel: Yes  · Bladder: Huynh  Urinary Catheter: Huynh   Colostomy/Ileostomy/Ileal Conduit: No       Date of Last BM: 6/9/19    Intake/Output Summary (Last 24 hours) at 6/9/2019 1329  Last data filed at 6/9/2019 0551  Gross per 24 hour   Intake 838 ml   Output 2750 ml   Net -1912 ml     I/O last 3 completed shifts: In: 229 [P.O.:795; I.V.:43]  Out: 2750 [Urine:2750]    Safety Concerns:     Sundowners [place] and At Risk for Falls    Impairments/Disabilities:      Hearing    Nutrition Therapy:  Current Nutrition Therapy:   - Oral Diet:  Carb Control 5 carbs/meal (2000kcals/day)    Routes of Feeding: Oral  Liquids: Thin Liquids  Daily Fluid Restriction: no  Last Modified Barium Swallow with Video (Video Swallowing Test): not done    Treatments at the Time of Hospital Discharge:   Respiratory Treatments: ***  Oxygen Therapy:  is not on home oxygen therapy. Ventilator:    - CPAP   at     Rehab Therapies: Physical Therapy and Occupational Therapy  Weight Bearing Status/Restrictions: No weight bearing restirctions  Other Medical Equipment (for information only, NOT a DME order):  walker  Other Treatments: ***    Patient's personal belongings (please select all that are sent with patient):  Hearing Aides bilateral    RN SIGNATURE:  Electronically signed by Mary Beth Durán on 6/10/19 at 3:49 PM    CASE MANAGEMENT/SOCIAL WORK SECTION    Inpatient Status Date: ***    Readmission Risk Assessment Score:  Readmission Risk              Risk of Unplanned Readmission:        23           Discharging to Facility/ Agency   · Name: [facility]  · Address:  · Phone:[phone]  · Fax:    Dialysis Facility (if applicable)   · Name:  · Address:  · Dialysis Schedule:  · Phone:  · Fax:    / signature: Electronically signed by PHILIPPE Russell on 6/10/19 at 10:13 AM    PHYSICIAN SECTION    Prognosis: Fair    Condition at Discharge: Stable    Rehab Potential (if transferring to Rehab): [address]    Physician Certification: I certify the above information and transfer of Zonia Ohara  is necessary for the continuing treatment of the diagnosis listed and that he requires [place] for less than 30 days.      Update Admission H&P: No change in H&P    PHYSICIAN SIGNATURE:  Electronically signed by Jorge Westbrook MD on 6/10/19 at 1:48 PM

## 2019-06-09 NOTE — DISCHARGE SUMMARY
Hospital Medicine Discharge Summary    Nithin Patiño  :  1931  MRN:  [id number]    Admit date:  2019  Discharge date:  2019    Admitting Physician:  Gini Corral MD  Primary Care Physician:  Mateusz Gr MD      Discharge Diagnoses:    Principal Problem:    Klebsiella sepsis [facility])  Active Problems:    Sepsis due to urinary tract infection ([address]    Atrial fibrillation ([address]    AICD (automatic cardioverter/defibrillator) present    Complicated UTI (urinary tract infection)    Aphasia    Left-sided Bell's palsy    CKD (chronic kidney disease) stage 4, GFR 15-29 ml/min ([address]    Pyelonephritis    Hydronephrosis, left    Left ureteral calculus  Resolved Problems:    Acute kidney injury ([address]    Gram negative sepsis ([address]  CHAYA ruled in but ATN ruled out    Hospital Course:   Nithin Patiño is a 80 y.o. male that was admitted and treated at [facility] for the following medical issues:     Principal Problem:    Klebsiella sepsis ([address]  Active Problems:    Sepsis due to urinary tract infection ([address]    Atrial fibrillation ([address]    AICD (automatic cardioverter/defibrillator) present    Complicated UTI (urinary tract infection)    Aphasia    Left-sided Bell's palsy    CKD (chronic kidney disease) stage 4, GFR 15-29 ml/min (HCC)    Pyelonephritis    Hydronephrosis, left    Left ureteral calculus  Resolved Problems:    Acute kidney injury ([address]    Gram negative sepsis ([address]  Treated for Klebsiella Sepsis due to Left Pyelonephritis with obstructive stone. He had double J stent placed this visit left ureter, Plan is to continue on IV antibiotics x 2 more weeks.    Renal function returned to baseline which is CKDIV   Patient was seen by the following consultants while admitted to [facility]:   Consults:  [address] [place] [address]  IP CONSULT TO UROLOGY  IP CONSULT TO PALLIATIVE CARE  IP CONSULT TO SPIRITUAL SERVICES    Significant Diagnostic Studies:    Ct Abdomen Pelvis Wo Contrast Additional Contrast? None    Result Date: 6/5/2019  CT of the Abdomen and Pelvis without intravenous contrast medium History:  Hydronephrosis. Urinary retention. Technical Factors: CT imaging of the abdomen and pelvis were obtained and formatted as 5 mm contiguous axial images from the domes of the diaphragm to the symphysis pubis. Sagittal and coronal reconstructions were also obtained. Oral contrast medium:  None. Intravenous contrast medium:  None. Comparison:  CT abdomen pelvis, September 2, 2014. Retroperitoneal sonography, June 4, 2019. Citizen of [place] [employer] Findings: Study is limited secondary to residual oral contrast medium, from modified barium swallow, performed earlier today. Lungs:  Right lung base shows small right pleural effusion with subsegmental right lower lobe consolidation. Left lung base shows subsegmental consolidation greater than contralateral side, also with small effusion. Liver:  Limited secondary to beam hardening artifact. Size, shape, and attenuation grossly normal. Bile Ducts:  Normal in caliber. Gallbladder: Contracted. Pancreas:  Normal without masses, cysts, ductal dilatation or calcification. Spleen:  Limited secondary to beam hardening artifact. Imaged portion of spleen In size without masses or calcifications. No splenules. Kidneys:  Right kidney again shows multiple renal cysts, unchanged in both size and number. No pelvocaliectasis or perinephric fluid identified. Left kidney also displays multiple cysts, also unchanged in both size and number. Mild to moderate left pelvocaliectasis is identified. Calculi measuring up to 6 mm are found in the lower pole. Adrenals:  Right adrenal without anomaly. Left adrenal cannot be assessed secondary to beam hardening artifact. Stomach: Retained high density contrast medium within stomach from recent modified barium swallow. Small bowel:  Normal in caliber.  Appendix:  Enlarged with transverse diameter of 9 mm. However, unchanged in size and contour from September, 2014, with no periappendiceal inflammatory change identified. Colon:  Normal in caliber. Diverticular change, sigmoid colon. Peritoneum:  No ascites, free air, or fluid collections. Vessels: Aorta normal in course and caliber. Diffuse bilateral aortoiliac calcification. Lymph nodes:  Retroperitoneal:  No enlarged retroperitoneal lymph nodes. Mesenteric:  No enlarged mesenteric lymph nodes. Pelvic: No enlarged pelvic lymph nodes. Ureters: Right ureter normal in course, caliber, and without calcification. Mild left hydroureter visualized level of left ureterovesical junction, where 6 mm calculus is visualized. Bladder: Decompressed. Huynh catheter identified. Abdominal Wall: Fat identified within right inguinal canal. Bones:  No bone lesions. Diffuse anterior osteophytes lower thoracic, and to lesser degree lumbar spine. Disc space narrowing diffusely L4-5, and L5-S1, with vacuum disc, L4-5. No post operative changes. Limited study as described. Mild to moderate left hydronephrosis with calculi measuring up to 6 mm in size, lower pole left kidney. Mild left hydroureter from ureteropelvic junction to ureterovesical junction where 6 mm calculus is visualized. Stable bilateral renal cysts. Bilateral lower lobe dependent subsegmental atelectasis/pneumonia small bilateral pleural effusions, left greater than right. Sigmoid diverticulosis. All CT scans at this facility use dose modulation, iterative reconstruction, and/or weight based dosing when appropriate to reduce radiation dose to as low as reasonably achievable. Xr Chest Portable    Result Date: 6/4/2019  EXAMINATION: CHEST PORTABLE VIEW  CLINICAL HISTORY: Abdominal pain COMPARISONS: March 28, 2019  FINDINGS: Single  views of the chest is submitted. There is a left-sided ICD device. Leads overlying the cardiac silhouette. Unchanged. The cardiac silhouette is enlarged. Unchanged configuration. .  The mediastinum is unremarkable. Pulmonary vascular attenuated. Lungs are hyperinflated. . Right sided trachea. Areas atelectasis both bases. There is an area of infiltrate within the medial aspect of the right lower lobe. .  No Pneumothoraces. INFILTRATE WITHIN THE MEDIAL ASPECT OF THE RIGHT LOWER LOBE SUPERIMPOSED UPON COPD. .. Us Retroperitoneal Limited    Result Date: 6/4/2019  EXAMINATION: LIMITED RETROPERITONEAL ULTRASOUND/BILATERAL RENAL ULTRASOUND CLINICAL HISTORY: ACUTE RENAL FAILURE, HISTORY OF RENAL CYSTS COMPARISONS: 3/14/2017 FINDINGS: The bilateral renal ultrasound demonstrates the following: Right kidney: Normal size right kidney measures 11.5 x 4.1 x 4.4 cm. There is no right nephrolithiasis or hydronephrosis. There are multiple right renal cysts. The largest cyst in the lower pole of the right kidney measures 3.3 cm. Left kidney: Normal size left kidney measuring 12.3 x 5.9 x 6.3 cm. There are several calculi within the lower pole of the left kidney and the largest left renal calculus measures 10 mm. There is left hydronephrosis and there is a dilated proximal left ureter. Also, there are multiple left renal cysts. The largest cyst in the lower pole of the left kidney measures 4.7 cm.     1. NORMAL SIZE RIGHT KIDNEY WITHOUT NEPHROLITHIASIS OR HYDRONEPHROSIS. 2. NORMAL SIZE LEFT KIDNEY WITH SEVERAL CALCULI IN LOWER POLE OF LEFT KIDNEY. 3. THERE IS LEFT HYDRONEPHROSIS AND MILD LEFT HYDROURETER. 4. ALSO, THERE ARE BILATERAL MULTIPLE RENAL CYSTS AS DESCRIBED. Fl Modified Barium Swallow W Video    Result Date: 6/6/2019  Modified barium swallow. HISTORY: Suspected aspiration. FINDINGS: Study performed with patient seated in upright position and recorded in lateral projection.  Commercially prepared nonstandardized barium viscosities, consisting of thin liquid, pudding, nectar, and soft solid, were administered sequentially, with a member of the Department of speech pathology in attendance. Mild oral dysphagia was identified. This is characterized by decreased tongue base retraction to the posterior pharyngeal wall. This resulted in premature loss of bolus and pharyngeal residuals following swallowing. Moderate pharyngeal phase dysphasia was identified. This was characterized by decreased laryngeal excursion and pharyngeal contraction. This resulted in poor epiglottic inversion and moderate to maximum scattered pharyngeal residuals following swallowing. Decreased upper esophageal sphincter opening resulted in maximum residuals at the level of the upper esophageal sphincter. Patient was able to partially clear residuals with 3 dry swallows. Aspiration was not present during the evaluation. Trace laryngeal penetration was visualized with thin liquid. Please compare with speech pathologist report. Mild oral dysphagia. Moderate pharyngeal dysphasia. No aspiration. Laryngeal penetration, trace, with thin liquid. One image. 4.7 minutes fluoroscopy time.       Discharge Medications:       Tony Pedroza   Home Medication Instructions VAI:[id number]    Printed on:06/09/19 2941   Medication Information                      amantadine (SYMMETREL) 100 MG capsule  Take 100 mg by mouth daily             b complex-C-folic acid (NEPHROCAPS) 1 MG capsule  Take 1 capsule by mouth daily             blood glucose monitor strips  by Other route 4 times daily             carvedilol (COREG) 12.5 MG tablet  Take 12.5 mg by mouth 2 times daily (with meals)             cefTRIAXone (ROCEPHIN) infusion  Infuse 1,000 mg intravenously every 12 hours for 14 days Compound per protocol             CPAP Machine MISC  by Does not apply route New Auto CPAP 8-20 cm with [place] Personal Style Finderkel Simplus, Size L             famotidine (PEPCID) 20 MG tablet  Take 1 tablet by mouth 2 times daily as needed (Heart burn)             glucagon, rDNA, 1 MG injection  Inject 1 mg into imaging therefore please ask your PCP, Manoj Henderson MD ,  to obtain [facility] record to follow up on all of the abnormal labs, imaging and findings that I have and have not addressed during your hospitalization.      Discharging you from the hospital does not mean that your medical care ends here and now. You may still need additional work up, investigation, monitoring, and treatment to be handled from this point on by outside providers including your PCP, Manoj Henderson MD , Specialists and other healthcare providers.      Please review your list of discharge medications prior to resuming medications you might still have at home, as the medications you need to be taking, dosages or how often you must take them may have changed. For medication questions, contact your retail pharmacy and your PCP, Manoj Henderson MD .     ** I STRONGLY RECOMMEND that you follow up with Manoj Henderson MD within 3 to 5 days for a post hospitalization evaluation. This specific office visit is covered by your insurance, and is not the same as your annual doctor visit/ check up. This office visit is important, as it may prevent need for repeat and/or future hospitalizations. **    Your medical team at [facility] ([facility]) appreciates the opportunity to work with you to get well!     Sincerely,  Sarah Beth Garibay

## 2019-06-09 NOTE — CARE COORDINATION
DC PLAN IS [facility], PATIENT WILL NEED PICC ON Monday AND ANTICIPATE DC. OK TO DC PER RENAL STANDPOINT PER NOTE

## 2019-06-10 NOTE — FLOWSHEET NOTE
Report called. Pt to be picked up at 1730 via [employer]. Electronically signed by Erin Pearson on 6/10/2019 at 4:20 PM

## 2019-06-10 NOTE — CARE COORDINATION
Phone call to [address] who does have a bed available for pt today. Met with pt and daughter Niki Rees who confirmed that this is first choice. They will discuss transportation options. Met with wife at bedside. Confirmed DC to [address] and they want transport via ambulette. Reviewed IMM.     Transport scheduled for 5:30 pm.

## 2019-06-10 NOTE — PROGRESS NOTES
Pt assessment is completed. Vitals are stable. AOX3 - (person, place and time) at beginning of assessment. Consent is completed and in file for PICC (6/10/19). Patient is resting comfortably. Will continue to monitor.    Electronically signed by Desirae Cowan RN on 6/9/2019 at 11:42 PM

## 2019-06-10 NOTE — PROGRESS NOTES
Physical Therapy Missed Treatment   Facility/Department: [facility] MED SURG [id number]    NAME: Cory Gilbert    : 1931 (80 y.o.)  MRN: [id number]    Account: [de-identified]  Gender: male    Chart reviewed, attempted PT at 8:40. Patient unavailable 2° to:      [x] Pt. Unavailable pt receiving pt care. Will attempt PT treatment again at earliest convenience.       Electronically signed by Damari Diaz PTA on 6/10/19 at 8:45 AM

## 2019-06-10 NOTE — PROGRESS NOTES
Hospitalist Progress Note      PCP: Ekta Reno MD    Date of Admission: 6/4/2019    Chief Complaint:  No acute events overnight,afebrile,stable HD, plan is for PICC line placement and d/c to SNF today    Medications:  Reviewed    Infusion Medications    sodium chloride      dextrose       Scheduled Medications    lidocaine  5 mL Intradermal Once    sodium chloride flush  10 mL Intravenous 2 times per day    cefTRIAXone (ROCEPHIN) IV  1 g Intravenous Q24H    polyethylene glycol  17 g Oral Daily    sennosides-docusate sodium  1 tablet Oral Daily    lactobacillus acidophilus  4 tablet Oral TID    b complex-C-folic acid  1 capsule Oral Daily    amantadine  100 mg Oral Daily    pantoprazole  40 mg Oral Daily    [Held by provider] heparin (porcine)  5,000 Units Subcutaneous 3 times per day    pravastatin  80 mg Oral Nightly    insulin lispro  0-6 Units Subcutaneous TID WC    insulin lispro  0-3 Units Subcutaneous Nightly     PRN Meds: sodium chloride flush, [Held by provider] chlorproMAZINE, magnesium hydroxide, ondansetron, acetaminophen, glucose, dextrose, glucagon (rDNA), dextrose      Intake/Output Summary (Last 24 hours) at 6/10/2019 1326  Last data filed at 6/10/2019 1158  Gross per 24 hour   Intake 590 ml   Output 4150 ml   Net -3560 ml       Exam:    BP (!) 162/82   Pulse 59   Temp 97.5 °F (36.4 °C) (Oral)   Resp 18   Ht 6' 2\" (1.88 m)   Wt 264 lb (119.7 kg)   SpO2 100%   BMI 33.90 kg/m²     General appearance: No apparent distress, appears stated age and cooperative. Respiratory:  Clear to auscultation, bilaterally without Rales/Wheezes/Rhonchi. Cardiovascular: Regular rate and rhythm with normal S1/S2 . Abdomen: Soft, non-tender, non-distended with normal bowel sounds. Musculoskeletal: No clubbing, cyanosis or edema bilaterally.       Labs:   Recent Labs     06/08/19  0742 06/09/19  0709 06/10/19  0738   WBC 13.0* 11.4* 10.4   HGB 10.1* 10.2* 10.5*   HCT 30.5* 30.3* 31.3*    256 276     Recent Labs     06/08/19  0742 06/09/19  0709 06/10/19  0738    137 139   K 4.2 4.1 4.0    104 105   CO2 18* 20 20   BUN 54* 45* 37*   CREATININE 2.53* 2.24* 1.89*   CALCIUM 7.8* 7.7* 8.0*     No results for input(s): AST, ALT, BILIDIR, BILITOT, ALKPHOS in the last 72 hours. No results for input(s): INR in the last 72 hours. No results for input(s): Willia [employer]age in the last 72 hours. Urinalysis:      Lab Results   Component Value Date    NITRU Negative 06/04/2019    WBCUA 20-50 06/04/2019    BACTERIA MODERATE 06/04/2019    RBCUA  06/04/2019    BLOODU LARGE 06/04/2019    SPECGRAV 1.017 06/04/2019    GLUCOSEU Negative 06/04/2019       Radiology:  [facility]   Final Result      XR ABDOMEN (KUB) (SINGLE AP VIEW)   Final Result      CT ABDOMEN PELVIS WO CONTRAST Additional Contrast? None   Final Result      Limited study as described. Mild to moderate left hydronephrosis with calculi measuring up to 6 mm in size, lower pole left kidney. Mild left hydroureter from ureteropelvic junction to ureterovesical junction where 6 mm calculus is visualized. Stable bilateral renal cysts. Bilateral lower lobe dependent subsegmental atelectasis/pneumonia small bilateral pleural effusions, left greater than right. Sigmoid diverticulosis. All CT scans at this facility use dose modulation, iterative reconstruction, and/or weight based dosing when appropriate to reduce radiation dose to as low as reasonably achievable. FL MODIFIED BARIUM SWALLOW W VIDEO   Final Result      Mild oral dysphagia. Moderate pharyngeal dysphasia. No aspiration. Laryngeal penetration, trace, with thin liquid. One image. 4.7 minutes fluoroscopy time. US RETROPERITONEAL LIMITED   Final Result   1. NORMAL SIZE RIGHT KIDNEY WITHOUT NEPHROLITHIASIS OR HYDRONEPHROSIS.    2. NORMAL SIZE LEFT KIDNEY WITH SEVERAL CALCULI IN LOWER POLE OF LEFT KIDNEY. 3. THERE IS LEFT HYDRONEPHROSIS AND MILD LEFT HYDROURETER. 4. ALSO, THERE ARE BILATERAL MULTIPLE RENAL CYSTS AS DESCRIBED. XR CHEST PORTABLE   Final Result   INFILTRATE WITHIN THE MEDIAL ASPECT OF THE RIGHT LOWER LOBE SUPERIMPOSED UPON COPD. ..                 Assessment/Plan:    Klebsiella pneumoniae bacteremia  - due to UTI complicated by left ureteral obstruction s/p stent placement  - continue Rocephin on d/c via PICC line per ID    Hyponatremia   - resolved with hydration    CHAYA on CKD III   - improved with IVFs  - OK to d/c per nephrology    DMII with hyperglycemia  - ISS, hypoglycemia protocol     A fib   - off [address] due to recent SDH     Urinary retention with   - likely worsened with Oxybutynin that was stopped      Disposition - SNF today              Electronically signed by Linda Savaeg MD on 6/10/2019 at 1:26 PM

## 2019-06-10 NOTE — PROGRESS NOTES
Physical Therapy Missed Treatment   Facility/Department: [facility] MED SURG [id number]    NAME: Nithin Brain    : 1931 (80 y.o.)  MRN: [id number]    Account: [de-identified]  Gender: male    Chart reviewed, attempted PT at 15:09. Patient unavailable 2° to:        [x] Pt. . off floor for test/procedure. [x] Pt. Unavailable as pt is in special procedure. Will attempt PT treatment again at earliest convenience.       Electronically signed by Sue Brito PTA on 6/10/19 at 3:09 PM

## 2019-06-10 NOTE — PROGRESS NOTES
UROLOGY CONSULT Progress Note-Dr Rain    6/10/2019   1:30 PM    Name:  Daron Vila  MRN:    [id number]     [place]:     [de-identified]   WKQZ:[id number]/[id number]  IP Day: 10     Admit Date: 6/4/2019  2:04 AM  PCP: Chris Matthews MD    Subjective:     C/C: Urosepsis with obstructing left ureteral calculus status post double-J stent placement  Chief Complaint   Patient presents with    Urinary Retention       Interval History: Status: Patient is responded well to double-J stent drainage    Past Medical History:   Diagnosis Date    Anticoagulant long-term use     xarelto    Atrial fibrillation ([facility]     Bell's palsy     BPH (benign prostatic hyperplasia)     CAD (coronary artery disease)     Cardiomyopathy ([facility]     Chronic kidney disease     Hyperlipidemia     Hypertension     Osteoarthritis     Type II or unspecified type diabetes mellitus without mention of complication, not stated as uncontrolled        ROS:  Review of Systems    Medications:      Allergies: No Known Allergies    Current Meds:   lidocaine 2 % injection 5 mL Once   sodium chloride flush 0.9 % injection 10 mL 2 times per day   sodium chloride flush 0.9 % injection 10 mL PRN   0.9 % sodium chloride infusion Once   [Held by provider] chlorproMAZINE (THORAZINE) tablet 10 mg TID PRN   cefTRIAXone (ROCEPHIN) 1 g IVPB in 50 mL D5W minibag Q24H   polyethylene glycol (GLYCOLAX) packet 17 g Daily   sennosides-docusate sodium (SENOKOT-S) 8.6-50 MG tablet 1 tablet Daily   lactobacillus acidophilus (FLORANEX) 4 tablet TID   b complex-C-folic acid (NEPHROCAPS) capsule 1 mg Daily   amantadine (SYMMETREL) capsule 100 mg Daily   pantoprazole (PROTONIX) tablet 40 mg Daily   magnesium hydroxide (MILK OF MAGNESIA) 400 MG/5ML suspension 30 mL Daily PRN   ondansetron (ZOFRAN) injection 4 mg Q6H PRN   acetaminophen (TYLENOL) tablet 650 mg Q4H PRN   [Held by provider] heparin (porcine) injection 5,000 Units 3 times per day   pravastatin (PRAVACHOL) tablet 80 mg Nightly   glucose (GLUTOSE) 40 % oral gel 15 g PRN   dextrose 50 % IV solution PRN   glucagon (rDNA) injection 1 mg PRN   dextrose 5 % solution PRN   insulin lispro (HUMALOG) injection vial 0-6 Units TID WC   insulin lispro (HUMALOG) injection vial 0-3 Units Nightly       Data:     Code Status:  Full Code    Family History   Problem Relation Age of Onset    Cancer Mother     Coronary Art Dis Father     Coronary Art Dis Brother     Hypertension Brother     High Cholesterol Brother     Diabetes Brother        Social History     Socioeconomic History    Marital status:      Spouse name: Not on file    Number of children: Not on file    Years of education: Not on file    Highest education level: Not on file   Occupational History    Not on file   Social Needs    Financial resource strain: Not on file    Food insecurity:     Worry: Not on file     Inability: Not on file    Transportation needs:     Medical: Not on file     Non-medical: Not on file   Tobacco Use    Smoking status: Never Smoker    Smokeless tobacco: Never Used   Substance and Sexual Activity    Alcohol use: No     Alcohol/week: 0.0 oz    Drug use: No    Sexual activity: Not on file   Lifestyle    Physical activity:     Days per week: Not on file     Minutes per session: Not on file    Stress: Not on file   Relationships    Social connections:     Talks on phone: Not on file     Gets together: Not on file     Attends Zoroastrian service: Not on file     Active member of club or organization: Not on file     Attends meetings of clubs or organizations: Not on file     Relationship status: Not on file    Intimate partner violence:     Fear of current or ex partner: Not on file     Emotionally abused: Not on file     Physically abused: Not on file     Forced sexual activity: Not on file   Other Topics Concern    Not on file   Social History Narrative    Not on file       Vitals:  BP (!) 162/82   Pulse 59   Temp 97.5 °F (36.4 °C) (Oral)   Resp 18   Ht 6' 2\" (1.88 m)   Wt 264 lb (119.7 kg)   SpO2 100%   BMI 33.90 kg/m²   Temp (24hrs), Av.6 °F (36.4 °C), Min:97.5 °F (36.4 °C), Max:97.7 °F (36.5 °C)    Recent Labs     19  1649 19  2059 06/10/19  0736 06/10/19  1104   POCGLU 145* 150* 112 211*       I/O (24Hr): Intake/Output Summary (Last 24 hours) at 6/10/2019 1330  Last data filed at 6/10/2019 1158  Gross per 24 hour   Intake 590 ml   Output 4150 ml   Net -3560 ml       Labs:  Hematology:  Recent Labs     06/10/19  0738   WBC 10.4   HGB 10.5*   HCT 31.3*        Chemistry:  Recent Labs     06/10/19  0738      K 4.0      CO2 20   GLUCOSE 118*   BUN 37*   CREATININE 1.89*   ANIONGAP 14   LABGLOM 33.8*   GFRAA 41.0*   CALCIUM 8.0*       Urine Culture   Lab Results   Component Value Date    LABURIN <25,000 CFU/ml 2019         Physical Examination:        Physical Exam no change in physical exam patient clinically improved urine in Huynh bag clear    Assessment:        Urosepsis with left ureteral obstruction secondary to ureteral calculus with indwelling double-J stent  Active Hospital Problems    Diagnosis Date Noted    Klebsiella sepsis ([address] [A41.4] 2019    Pyelonephritis [N12]     Hydronephrosis, left [N13.30]     Left ureteral calculus [J97.8]     Complicated UTI (urinary tract infection) [N39.0] 2019    Sepsis due to urinary tract infection ([address] [A41.9, N39.0] 2019    Aphasia [R47.01] 2019    Left-sided Bell's palsy [G51.0] 2019    AICD (automatic cardioverter/defibrillator) present [Z95.810] 2018    Atrial fibrillation ([address] [I48.91] 2016    CKD (chronic kidney disease) stage 4, GFR 15-29 ml/min ([facility] [N18.4] 10/21/2014         Plan:        1. Okay to discharge nursing facility  2.  I will discussed patient with Dr. Symone Chan regarding treatment of stone removal of stent      Electronically signed by Reena Mendoza MD on 6/10/2019 at 1:30 PM

## 2019-06-10 NOTE — PROGRESS NOTES
Nephrology Progress Note    Assessment:  80y.o. year old male with history s/f T2DM, HTN, CHF, A.fib, HTN and CAD who presented for weakness, decreased UOP     1. CHAYA on CKD stage III: possibly 2/2 LT sided hydronephrosis causing obstructive nephropathy (stone related vs. BPH)  2. CKD stage III: baseline Scr ~1.7-1.9 w/ eGFR mid/high 30s, risk factors include T2DM, HTN, CAD, CHF, ?obstructive nephropathy (has several renal stones)   3. Hyponatremia: resolved   4. Klebsiella PNA bacteremia: on abx     Plan:  - ok to d/c from renal standpoint  - has appt w/ Dr. Caro  already scheduled for mid June      Thank you for the consultation. Will continue to follow  Please do not hesitate to call with questions.       Patient Active Problem List:     Type II diabetes mellitus, uncontrolled (HCC)     CHF (congestive heart failure)     BPH (benign prostatic hyperplasia)     Hypercholesteremia     CKD (chronic kidney disease) stage 3, GFR 30-59 ml/min (HCC)     Dystrophy of anterior cornea     Acute sinusitis     Basal cell carcinoma of nose     Retained foreign body of eyelid     Tear film insufficiency     Hypertension     Atrial fibrillation (HCC)     Anticoagulant long-term use     Cardiomyopathy ([address]     AICD (automatic cardioverter/defibrillator) present     DCM (dilated cardiomyopathy) ([address]     Coronary artery disease involving native coronary artery of native heart with angina pectoris ([address]     CHAYA (acute kidney injury) ([address]     UTI (urinary tract infection)     Anticoagulated     Aphasia     Bacteriuria     Encephalopathy     Left-sided Bell's palsy     Leukocytosis     Subdural hematoma (HCC)     Type 2 diabetes mellitus with hyperglycemia (HCC)     Implantable cardioverter-defibrillator (ICD) in situ     Dilated cardiomyopathy (HCC)     Congestive heart failure (HCC)     Chronic renal insufficiency, stage III (moderate) ([facility])     Coronary arteriosclerosis in native artery     Corneal epithelial dystrophy results for input(s): IRONS, FERRITIN in the last 72 hours. Invalid input(s): LABIRONS  Urinalysis: No results for input(s): UA in the last 72 hours.     Objective:  Vitals: BP (!) 162/82   Pulse 59   Temp 97.5 °F (36.4 °C) (Oral)   Resp 18   Ht 6' 2\" (1.88 m)   Wt 264 lb (119.7 kg)   SpO2 100%   BMI 33.90 kg/m²    Wt Readings from Last 3 Encounters:   06/07/19 264 lb (119.7 kg)   05/28/19 218 lb (98.9 kg)   05/22/19 225 lb (102.1 kg)      24HR INTAKE/OUTPUT:      Intake/Output Summary (Last 24 hours) at 6/10/2019 1403  Last data filed at 6/10/2019 1158  Gross per 24 hour   Intake 590 ml   Output 4150 ml   Net -3560 ml     General: alert, in no apparent distress  HEENT: normocephalic, atraumatic, anicteric  Neck: supple, no mass  Lungs: non-labored respirations, clear to auscultation bilaterally  Heart: regular rate and rhythm, no murmurs or rubs  Abdomen: soft, non-tender, non-distended  MSK: no joint swelling or tenderness  : + hansen catheter, no CVA tenderness   Ext: no cyanosis, no peripheral edema  Neuro: alert and oriented, no gross abnormalities  Psych: normal mood and affect    Electronically signed by Ani Gómez MD

## 2019-06-11 NOTE — PROGRESS NOTES
Physical Therapy  Facility/Department: [facility] MED SURG H140/U929-86  Physical Therapy Discharge      NAME: Lisbet Loredo    : 1931 (80 y.o.)  MRN: [id number]    Account: [de-identified]  Gender: male      Patient has been discharged from acute care hospital. DC patient from current PT program.      Electronically signed by Chavez Estrada PT on 19 at 8:31 AM

## 2019-06-19 NOTE — TELEPHONE ENCOUNTER
Fouzia from Dr Sergio Jones office called. Patient needs to have kidney stone surgery (not scheduled yet) and is is ok to hold xarelto 7-10 days prior to surgery? Per Dr Jorge Day it is ok to hold xarelto for only 2 days.

## 2019-06-19 NOTE — PROGRESS NOTES
10 mg rectally daily      glucagon, rDNA, 1 MG injection Inject 1 mg into the muscle as needed for Low blood sugar (Blood glucose less than 70 mg/dL and patient NOT ALERT or NPO and does not have IV access.)      insulin lispro (HUMALOG) 100 UNIT/ML injection vial Inject 0-3 Units into the skin nightly 1 vial 3    sennosides-docusate sodium (SENOKOT-S) 8.6-50 MG tablet Take 1 tablet by mouth daily      famotidine (PEPCID) 20 MG tablet Take 1 tablet by mouth 2 times daily as needed (Heart burn)      blood glucose monitor strips by Other route 4 times daily 400 strip 2    CPAP Machine MISC by Does not apply route New Auto CPAP 8-20 cm with [employer] Simplus, Size L 1 each 0    Respiratory Therapy Supplies JOE New CPAP mask and supplies 1 Device 0    glucose blood VI test strips (EXACTECH TEST) strip 1 each by In Vitro route 4 times daily As needed. 100 each 11    pravastatin (PRAVACHOL) 80 MG tablet Take 80 mg by mouth daily.  Heparin Sodium, Porcine, (HEPARIN, PORCINE,) 5000 UNIT/ML injection Inject 1 mL into the skin every 8 hours       No current facility-administered medications for this visit. Patient has no known allergies. reviewed        Review of Systems   Constitutional: Negative for fever. Gastrointestinal: Negative for abdominal pain. Genitourinary: Negative for flank pain and hematuria. Objective:   Physical Exam   Constitutional: He appears cachectic. Abdominal: Soft. There is no tenderness. There is no rebound and no CVA tenderness. Genitourinary:   Genitourinary Comments: Urine is clear   Neurological: He is alert.      Read Date Phone Pager   Cindy Lopez Jun 18, 2019 [phone]    All Reviewers List     Lisa Fulton MD on 6/18/2019 13:18   Lisa Fulton MD on 6/18/2019 13:18   Routing History     Priority Sent On From To Message Type    6/18/2019 11:24 AM David, Chpo Incoming Radiant Results From Milagro Blackburn MD Results Radiation Dose Estimates     No radiation information found for this patient   Narrative   EXAMINATION: CT ABDOMEN/PELVIS WO CONTRAST:        DATE AND TIME: 6/17/2019 at 2:40 PM.       CLINICAL HISTORY: ACUTE ABDOMINAL PAIN. EPIGASTRIC PAIN.  LEFT URETERAL STONE.        COMPARISON: June 5, 2019.       TECHNIQUE: Contiguous axial CT sections of the abdomen and pelvis.  No IV contrast administered.  All CT scans at this facility use dose modulation, iterative reconstruction, and/or weight based dosing when appropriate to reduce radiation dose to as low    as reasonably achievable.       FINDINGS: There now is a ureteral stent in satisfactory position on the left. There is no left hydronephrosis. There is a 4 mm calcific density contiguous to the distal left ureteral stent. Seen best on axial image #70 of series #2. This is suspicious    for a distal left ureteral stone. Additional punctate nonobstructing calculi in the left kidney. Bilateral renal cysts again noted.       Small bilateral pleural reactions again noted with infiltrate at left base, slightly improved.       The rest of the CT shows no additional acute pathology.           Impression   LEFT URETERAL STENT WITH DECOMPRESSION OF PREVIOUS LEFT HYDRONEPHROSIS. PROBABLE 4 MM STONE IN DISTAL THIRD OF LEFT URETER.             6/6/2019  9:14 AM - David, Chpo Incoming Lab Results From Soft     Specimen Information: Urine, clean catch        Component Collected Lab   Urine Culture, Routine 06/04/2019  4:10 AM [facility]   Organism Abnormal  06/04/2019  4:10 AM [facility]   Klebsiella pneumoniae ssp pneumoniae    Urine Culture, Routine 06/04/2019  4:10 AM [facility]   75,000 CFU/ml    Testing Performed By     Lab - Abbreviation Name Director Address Valid Date Range   [phone] [facility] Lucho Celis MD [address]   [place] [address] 07/12/18 0959-Present   Narrative   Performed

## 2019-06-19 NOTE — TELEPHONE ENCOUNTER
Fouzia from [facility] office called. Patient have kidney stone surgery ( No Schedule yet. I will up date .

## 2019-06-20 NOTE — TELEPHONE ENCOUNTER
Received call from Kirti Shaw ( [facility] She states patient was seen Dilshad Higginbotham would like to  D/C patient IV Abx today. She states before she stop IV Abx,she need verifation from . I will consult .

## 2019-06-24 NOTE — TELEPHONE ENCOUNTER
Consulted  about verification to D/C IV Abx. Per  okay to stop. Called last spoke with Chris Rocha relayed  Message and She Verbalized Understanding.

## 2019-06-24 NOTE — PROGRESS NOTES
(HUMALOG) 100 UNIT/ML injection vial Inject 0-3 Units into the skin nightly 1 vial 3    sennosides-docusate sodium (SENOKOT-S) 8.6-50 MG tablet Take 1 tablet by mouth daily      famotidine (PEPCID) 20 MG tablet Take 1 tablet by mouth 2 times daily as needed (Heart burn)      blood glucose monitor strips by Other route 4 times daily 400 strip 2    CPAP Machine MISC by Does not apply route New Auto CPAP 8-20 cm with [employer] Simplus, Size L 1 each 0    Respiratory Therapy Supplies JOE New CPAP mask and supplies 1 Device 0    glucose blood VI test strips (EXACTECH TEST) strip 1 each by In Vitro route 4 times daily As needed. 100 each 11    pravastatin (PRAVACHOL) 80 MG tablet Take 80 mg by mouth daily. No current facility-administered medications for this visit.         Past Medical History:   Diagnosis Date    Anticoagulant long-term use     xarelto    Atrial fibrillation (HCC)     Bell's palsy     BPH (benign prostatic hyperplasia)     CAD (coronary artery disease)     Cardiomyopathy (HCC)     Chronic kidney disease     Hyperlipidemia     Hypertension     Osteoarthritis     Type II or unspecified type diabetes mellitus without mention of complication, not stated as uncontrolled        Past Surgical History:   Procedure Laterality Date    CORONARY ANGIOPLASTY      CYSTOSCOPY INSERTION / REMOVAL STENT / STONE Left 6/6/2019    CYSTOSCOPY, LEFT DOUBLE J STENT INSERTION performed by Palak Claros MD at [address] [place] [address] CATH LAB PROCEDURE      EYE SURGERY Bilateral 03/2018    PACEMAKER INSERTION N/A 1994    SKIN CANCER EXCISION  2016    SKIN CANCER EXCISION         Social History     Socioeconomic History    Marital status:      Spouse name: Not on file    Number of children: Not on file    Years of education: Not on file    Highest education level: Not on file   Occupational History    Not on file   Social Needs    Financial resource strain: Not on file  Food insecurity:     Worry: Not on file     Inability: Not on file    Transportation needs:     Medical: Not on file     Non-medical: Not on file   Tobacco Use    Smoking status: Never Smoker    Smokeless tobacco: Never Used   Substance and Sexual Activity    Alcohol use: No     Alcohol/week: 0.0 oz    Drug use: No    Sexual activity: Not on file   Lifestyle    Physical activity:     Days per week: Not on file     Minutes per session: Not on file    Stress: Not on file   Relationships    Social connections:     Talks on phone: Not on file     Gets together: Not on file     Attends Restorationism service: Not on file     Active member of club or organization: Not on file     Attends meetings of clubs or organizations: Not on file     Relationship status: Not on file    Intimate partner violence:     Fear of current or ex partner: Not on file     Emotionally abused: Not on file     Physically abused: Not on file     Forced sexual activity: Not on file   Other Topics Concern    Not on file   Social History Narrative    Not on file       Family History   Problem Relation Age of Onset    Cancer Mother     Coronary Art Dis Father     Coronary Art Dis Brother     Hypertension Brother     High Cholesterol Brother     Diabetes Brother          Review of Systems:   Review of Systems   Constitutional: Positive for fatigue. Negative for appetite change, chills, diaphoresis and fever. HENT: Negative. Eyes: Negative. Respiratory: Negative for apnea, cough, choking, chest tightness, shortness of breath, wheezing and stridor. Cardiovascular: Negative for chest pain, palpitations and leg swelling. Gastrointestinal: Negative. Endocrine: Negative. Genitourinary: Negative. Musculoskeletal: Negative. Allergic/Immunologic: Negative. Neurological: Negative. Hematological: Negative. Psychiatric/Behavioral: Negative.           Physical Examination:    /75 (Site: Left Upper Arm, Position: Sitting, Cuff Size: Medium Adult)   Pulse 62   Resp 18   Ht 6' 2\" (1.88 m)   SpO2 98%   BMI 28.89 kg/m²    Physical Exam   Constitutional: He is oriented to person, place, and time. He appears well-developed and well-nourished. HENT:   Head: Normocephalic. Eyes: Pupils are equal, round, and reactive to light. Neck: No JVD present. No tracheal deviation present. No thyromegaly present. Cardiovascular: Normal heart sounds and intact distal pulses. An irregularly irregular rhythm present. Exam reveals no gallop and no friction rub. No murmur heard. Pulmonary/Chest: Effort normal and breath sounds normal. No stridor. No respiratory distress. He has no wheezes. He has no rales. He exhibits no tenderness. Abdominal: Soft. Bowel sounds are normal.   Musculoskeletal: Normal range of motion. He exhibits no edema or tenderness. Lymphadenopathy:     He has no cervical adenopathy. Neurological: He is alert and oriented to person, place, and time. Skin: Skin is warm and dry. Psychiatric: He has a normal mood and affect.        LABS:  CBC:   Lab Results   Component Value Date    WBC 10.4 06/10/2019    RBC 3.52 06/10/2019    HGB 10.5 06/10/2019    HCT 31.3 06/10/2019    MCV 89.0 06/10/2019    MCH 29.8 06/10/2019    MCHC 33.5 06/10/2019    RDW 17.5 06/10/2019     06/10/2019    MPV 12.3 02/09/2015     Lipids:  Lab Results   Component Value Date    CHOL 156 09/21/2016    CHOL 162 11/23/2015    CHOL 167 06/14/2012     Lab Results   Component Value Date    TRIG 84 09/21/2016    TRIG 112 11/23/2015    TRIG 111 06/14/2012     Lab Results   Component Value Date    HDL 47 09/21/2016    HDL 46 11/23/2015    HDL 47 06/14/2012     Lab Results   Component Value Date    LDLCALC 92 09/21/2016    LDLCALC 94 11/23/2015    LDLCALC 102 06/14/2012     No results found for: LABVLDL, VLDL  Lab Results   Component Value Date    CHOLHDLRATIO 3.6 06/14/2012     CMP:    Lab Results   Component Value Date     06/10/2019    K 4.0 06/10/2019    K 4.2 06/05/2019     06/10/2019    CO2 20 06/10/2019    BUN 37 06/10/2019    CREATININE 1.89 06/10/2019    GFRAA 41.0 06/10/2019    LABGLOM 33.8 06/10/2019    GLUCOSE 118 06/10/2019    GLUCOSE 141 12/30/2011    PROT 6.9 06/04/2019    LABALBU 3.2 06/04/2019    CALCIUM 8.0 06/10/2019    BILITOT 1.0 06/04/2019    ALKPHOS 107 06/04/2019    AST 18 06/04/2019    ALT 25 06/04/2019     BMP:    Lab Results   Component Value Date     06/10/2019    K 4.0 06/10/2019    K 4.2 06/05/2019     06/10/2019    CO2 20 06/10/2019    BUN 37 06/10/2019    LABALBU 3.2 06/04/2019    CREATININE 1.89 06/10/2019    CALCIUM 8.0 06/10/2019    GFRAA 41.0 06/10/2019    LABGLOM 33.8 06/10/2019    GLUCOSE 118 06/10/2019    GLUCOSE 141 12/30/2011     Magnesium:    Lab Results   Component Value Date    MG 2.0 06/05/2019     TSH:No results found for: TSHFT4, TSH  .result  No results for input(s): PROBNP in the last 72 hours. No results for input(s): INR in the last 72 hours.             Patient Active Problem List   Diagnosis    Type II diabetes mellitus, uncontrolled ([facility]    CHF (congestive heart failure)    BPH (benign prostatic hyperplasia)    Hypercholesteremia    CKD (chronic kidney disease) stage 3, GFR 30-59 ml/min ([facility])    Dystrophy of anterior cornea    Acute sinusitis    Basal cell carcinoma of nose    Retained foreign body of eyelid    Tear film insufficiency    Hypertension    Atrial fibrillation (HCC)    Anticoagulant long-term use    Cardiomyopathy ([address]    AICD (automatic cardioverter/defibrillator) present    DCM (dilated cardiomyopathy) ([facility]    Coronary artery disease involving native coronary artery of native heart with angina pectoris ([facility]    [facility]    Complicated UTI (urinary tract infection)    Anticoagulated    Aphasia    Bacteriuria    Encephalopathy    Left-sided Bell's palsy    Leukocytosis    Subdural hematoma (HCC)    Type 2 diabetes mellitus with hyperglycemia (HCC)    Implantable cardioverter-defibrillator (ICD) in situ    Dilated cardiomyopathy (HCC)    Congestive heart failure (HCC)    CKD (chronic kidney disease) stage 4, GFR 15-29 ml/min (HCC)    Coronary arteriosclerosis in native artery    Corneal epithelial dystrophy    Hypercholesterolemia    Tear film insufficiency, unspecified    Type 2 diabetes mellitus [facility]    Long term current use of anticoagulant therapy    Atrial fibrillation (HCC)    Benign prostatic hyperplasia    Sepsis due to urinary tract infection ([facility]    Pyelonephritis    Hydronephrosis, left    Left ureteral calculus    Klebsiella sepsis (HCC)       Assessment/Plan:     Diagnosis Orders   1. Congestive heart failure, unspecified HF chronicity, unspecified heart failure type ([facility]  stable   2. Essential hypertension  stable   3. Chronic atrial fibrillation (HCC)  NO OAC   4. Cardiomyopathy, unspecified type ([facility]     5. AICD (automatic cardioverter/defibrillator) present       Spoke at length with patient and daughter no OAC at this time  Counseling: The importance of daily weights, dietary sodium restriction, and contact with cardiology if weight is increased more than 3 lbs in any 48 hour period was stressed. The patient has been advised to contact us if theyexperience progressive SOB, orthopnea, PND or progressive edema.

## 2019-06-27 NOTE — PROGRESS NOTES
Subjective:      Patient ID: Manuel Wolfe is a 80 y.o. male. HPI  This is an 79 yo white male with h/o HTN, Pacemaker, CKD, Stones, DM, BPH w/LUTs on Flomax and Proscar and bilateral renal cysts, prior negative microhematuria evaluation in 2009 (cysto neg) back in follow-up after seen by Dr Crystal Elmore on 6/6/19 for cystosocopy with Lt stent for obstructing Lt ureteral stone and sepsis. He had positive blood and urine cultures and was discharged in IV antibiotics and with a Huynh catheter. He is being managed by ID and sees Dr Edith Apley for Xarelto use for Afib. He had a fall recently and developed a subdural hematoma that required transfer to [facility] and surgery and he was told not to use the Xarelto any longer but this was just resumed at the NH and he and his daughter are currently confused about the medication. Since last seen on 6/19/19, he saw cardiology and is off all anti-coagulation per their recommendation. He is finishing antibiotics as per ID. The BPH medications were resumed last week and he is here for catheter removal. He was seen in the [facility] ED yesterday because of tremors and evaluation was \"negative\" except the urine was felt to be suspicious. He has a catheter and stent. He has no hematuria or flank or abd pain and is here with his son-in-law. I again had an extensive discussion with the patient and his son-in-law about the interval CT and KUB results and reviewed these with Dr Crystal Elmore as well on PACS. Again, Dr Crystal Elmore felt the stone partially broke when he placed the stent and saw fragments pass. I reviewed the interval medical records from the [facility].      Past Medical History:   Diagnosis Date    Anticoagulant long-term use     xarelto    Atrial fibrillation (HCC)     Bell's palsy     BPH (benign prostatic hyperplasia)     CAD (coronary artery disease)     Cardiomyopathy (HCC)     Chronic kidney disease     Hyperlipidemia     Hypertension     Osteoarthritis     Type II or unspecified type diabetes mellitus without mention of complication, not stated as uncontrolled      Past Surgical History:   Procedure Laterality Date    CORONARY ANGIOPLASTY      CYSTOSCOPY INSERTION / REMOVAL STENT / STONE Left 6/6/2019    CYSTOSCOPY, LEFT DOUBLE J STENT INSERTION performed by Aguila Hogan MD at [address] [place] [address] CATH LAB PROCEDURE      EYE SURGERY Bilateral 03/2018    PACEMAKER INSERTION N/A 1994    SKIN CANCER EXCISION  2016    SKIN CANCER EXCISION       Social History     Socioeconomic History    Marital status:      Spouse name: None    Number of children: None    Years of education: None    Highest education level: None   Occupational History    None   Social Needs    Financial resource strain: None    Food insecurity:     Worry: None     Inability: None    Transportation needs:     Medical: None     Non-medical: None   Tobacco Use    Smoking status: Never Smoker    Smokeless tobacco: Never Used   Substance and Sexual Activity    Alcohol use: No     Alcohol/week: 0.0 oz    Drug use: No    Sexual activity: None   Lifestyle    Physical activity:     Days per week: None     Minutes per session: None    Stress: None   Relationships    Social connections:     Talks on phone: None     Gets together: None     Attends Bahai service: None     Active member of club or organization: None     Attends meetings of clubs or organizations: None     Relationship status: None    Intimate partner violence:     Fear of current or ex partner: None     Emotionally abused: None     Physically abused: None     Forced sexual activity: None   Other Topics Concern    None   Social History Narrative    None     Family History   Problem Relation Age of Onset    Cancer Mother     Coronary Art Dis Father     Coronary Art Dis Brother     Hypertension Brother     High Cholesterol Brother     Diabetes Brother      Current Outpatient Medications   Medication Sig Dispense Refill    guarding. Neurological: He is alert. Testing Performed By      Lab - Abbreviation Name Director Address Valid Date Range   [id number] [facility] Velia Calderon MD [address] [place] [address] 07/12/18 0959-Present   Narrative   Performed by: [facility]   ORDER#: [id number]                          ORDERED BY: Heidy Horvath  SOURCE: Urine Clean Catch                  COLLECTED:  06/04/19 04:10  ANTIBIOTICS AT STIVEN. :                      RECEIVED :  06/04/19 04:10   Susceptibility      Klebsiella pneumoniae ssp pneumoniae (1)              Antibiotic Interpretation KLAUS Status     amoxicillin-clavulanate Sensitive <=2 mcg/mL       ceFAZolin Sensitive <=4 mcg/mL       ciprofloxacin Sensitive <=0.25 mcg/mL       gentamicin Sensitive <=1 mcg/mL       nitrofurantoin Intermediate 64 mcg/mL       trimethoprim-sulfamethoxazole Sensitive <=20 mcg/mL         Urine was Nit neg at [facility] and [facility] 6    Assessment: This is an 81 yo white male with h/o HTN, AFib/Xarelto, recent subdural hematoma, CKD, Stones, DM, BPH w/LUTs, and recent admission for urosepsis and had stent placed for a Lt UVJ stone that partially broke with stent placement. He has retention and has resumed Flomax and Proscar and the catheter was removed today for a voiding trial. He needs to complete therapy for his sepsis as per ID. He is a high risk patient given all of his co-morbidities. I again discussed further stone treatment options including URS with laser vs ESWL vs stent removal and he wants the latter and his family agrees. They are concerned about his ability to tolerate any procedures and would prefer to have stent removed with hopeful smaller stone fragment passage on Flomax. They understand the possiblity for recurrent Urosepsis and need for stent replacement and significant morbidity if gets complication while trying to pass any residual fragments incuding death.  The catheter was removed without complication today and urine was clear. Plan:      1. Catheter to be replaced with 16 Fr if unable to void  2. Flexible cystoscopy with Lt double-J stent removal on 7/15/19 at [facility]   3. Cipro 400 mg IV pre-op  4. Start Augmentin 500 mg po BID on 7/13/19 for 5 days  5.  Stay off anticogulants        Jacque Scanlon MD

## 2019-07-12 NOTE — TELEPHONE ENCOUNTER
----- Message from Nae Yeager sent at 7/12/2019 12:45 PM EDT -----  Contact: [phone]  Pts daughter Darwin Holden calling because pt has surgery with Dr Hoda Altman Monday and she wanted to give us information that pt had 2 seizures, one 06/26/19 and on 07/08/19 he is currently on tegretol anti seizure medication and patient is confused and belligerent. Pts daughter is concerned about upcoming procedure during pts current state.

## 2019-08-02 NOTE — PROGRESS NOTES
daily      glipiZIDE (GLUCOTROL) 5 MG tablet Take 5 mg by mouth daily       bisacodyl (DULCOLAX) 10 MG suppository Place 10 mg rectally daily      glucagon, rDNA, 1 MG injection Inject 1 mg into the muscle as needed for Low blood sugar (Blood glucose less than 70 mg/dL and patient NOT ALERT or NPO and does not have IV access.)      insulin lispro (HUMALOG) 100 UNIT/ML injection vial Inject 0-3 Units into the skin nightly 1 vial 3    sennosides-docusate sodium (SENOKOT-S) 8.6-50 MG tablet Take 1 tablet by mouth daily      famotidine (PEPCID) 20 MG tablet Take 1 tablet by mouth 2 times daily as needed (Heart burn)      blood glucose monitor strips by Other route 4 times daily 400 strip 2    CPAP Machine MISC by Does not apply route New Auto CPAP 8-20 cm with [employer] Simplus, Size L 1 each 0    Respiratory Therapy Supplies JOE New CPAP mask and supplies 1 Device 0    glucose blood VI test strips (EXACTECH TEST) strip 1 each by In Vitro route 4 times daily As needed. 100 each 11    pravastatin (PRAVACHOL) 80 MG tablet Take 80 mg by mouth daily.  cefTRIAXone (ROCEPHIN) 1 g injection Inject 1 g into the muscle every 12 hours      amantadine (SYMMETREL) 100 MG capsule Take 100 mg by mouth daily       No current facility-administered medications for this visit. Keppra [levetiracetam] and Seroquel [quetiapine fumarate]  reviewed      Review of Systems   Gastrointestinal: Negative for abdominal pain. Genitourinary: Negative for flank pain and hematuria. Objective:   Physical Exam   Constitutional: He appears lethargic. He appears cachectic. He is sleeping. Abdominal: Soft. There is no tenderness. Genitourinary: Right testis shows no mass and no tenderness. Left testis shows no mass and no tenderness. Uncircumcised. Paraphimosis, penile erythema and penile tenderness present. No discharge found.    Genitourinary Comments: I reduced the paraphimosis    Neurological: He appears lethargic. Urine C/S [facility] > 100,000 cfu of VRE, sens to Macrobid. P. Aeruginosa < 5000 cfu sens to gent and cipro    Procedure; Under sterile conditions, the prior 18 Fr catheter was removed and replaced with 2 % Urojet using a 16 Fr Coude catheter with some resistance at the prostatic urethra and 10 cc of sterile water were place din balloon. Catheter irrigated easily of clear urine with 30 cc of sterile water. Assessment: This is an 79 yo white male with h/o HTN, Pacemaker, CKD, Stones, DM, BPH w/LUTs on Flomax and Proscar and bilateral renal cysts, recent fall with surgery for subdural hematoma, AFib off Xarelto, and with continued worsening in his mental status and medical condition. I had a long and extensive discussion about further possible options for management of the distal Lt ureteral stone including just flexible cystoscopy with stent removal, ESWL with stent removal, URS with stone removal and stent removal and he is most inclined to want to avoid any GA. He understands the stent being removed may result in recurrent sepsis if the stone does not pass. Given the patient's current mental state and not being able to participate any any decision making (he was at last visit) I recommend against any procedure at this time until he recovers as these are likely all too high risk. The son-in-law agrees. I explained the stent can stay in place for some time. Plan:      1.  Stone CT, BMP and CBC on day of next visit 8/12/19 for further family discussion        Clint Benavidez MD

## 2019-08-12 NOTE — PROGRESS NOTES
Take 3 mg by mouth nightly as needed      OXcarbazepine (TRILEPTAL) 300 MG tablet Take 300 mg by mouth daily      carvedilol (COREG) 12.5 MG tablet Take 12.5 mg by mouth 2 times daily (with meals)      tamsulosin (FLOMAX) 0.4 MG capsule Take 0.4 mg by mouth daily      finasteride (PROSCAR) 5 MG tablet Take 5 mg by mouth daily      glipiZIDE (GLUCOTROL) 5 MG tablet Take 5 mg by mouth daily       bisacodyl (DULCOLAX) 10 MG suppository Place 10 mg rectally daily      sennosides-docusate sodium (SENOKOT-S) 8.6-50 MG tablet Take 1 tablet by mouth daily      famotidine (PEPCID) 20 MG tablet Take 1 tablet by mouth 2 times daily as needed (Heart burn)      CPAP Machine MISC by Does not apply route New Auto CPAP 8-20 cm with [employer] Simplus, Size L 1 each 0    Respiratory Therapy Supplies JOE New CPAP mask and supplies 1 Device 0    pravastatin (PRAVACHOL) 80 MG tablet Take 80 mg by mouth daily. No current facility-administered medications for this visit. Keppra [levetiracetam] and Seroquel [quetiapine fumarate]  reviewed      Review of Systems   Constitutional: Negative for fever. Gastrointestinal: Negative for abdominal pain. Genitourinary: Negative for flank pain and hematuria. Objective:   Physical Exam   Abdominal: Soft. Genitourinary:   Genitourinary Comments: Urine is clear in Huynh bag       Assessment: This is an 79 yo white male with h/o HTN, Pacemaker, CKD, Stones, DM, BPH w/LUTs on Flomax and Proscar and bilateral renal cysts, recent fall with surgery for subdural hematoma, AFib off Xarelto, and with an improved mental status since last seen. The CT done today suggests stone passage.  I had a long and extensive discussion about further possible options for management and the patient and family want flexible cystoscopy with stent removal, ESWL with stent removal. They understand that the stone could still be present in the ureter and is not just well seen on current CT and the stent being removed may result in recurrent sepsis if the stone does not pass. The family perdue snot want him to undergo GA if possible. I will replace the Huynh for a few more weeks and will and consider removal in a few weeks. The risks and benefits of cystoscopy with stent removal including but not limited to infection/sepsis, pain, bleeding, stricture, retention, perforation, death and need for multiple procedures and he wants to proceed as planned. They also understand if the retention is recurrent, he may need a long term catheter or could consider referral to a tertiary center to consider [place]. I will discuss pre-op antibiotic coverage with Dr Marah Schmid for ID given prior susceptibilities. Plan:      1. Flexible cystoscopy with Lt double-J stent removal on 8/14/19, MAC  2. F/U 2 weeks after for possible voiding trial  3.  Continue Macrobid 100 mg po BID for 3 more days        Parisa Gillespie MD

## 2019-08-14 NOTE — ANESTHESIA PRE PROCEDURE
Current medications:    No current facility-administered medications for this encounter. Current Outpatient Medications   Medication Sig Dispense Refill    aspirin 325 MG tablet Take 325 mg by mouth every 4 hours as needed for Pain      amLODIPine (NORVASC) 10 MG tablet Take 10 mg by mouth daily      melatonin 3 MG TABS tablet Take 3 mg by mouth nightly as needed      OXcarbazepine (TRILEPTAL) 300 MG tablet Take 300 mg by mouth daily      carvedilol (COREG) 12.5 MG tablet Take 12.5 mg by mouth 2 times daily (with meals)      tamsulosin (FLOMAX) 0.4 MG capsule Take 0.4 mg by mouth daily      finasteride (PROSCAR) 5 MG tablet Take 5 mg by mouth daily      glipiZIDE (GLUCOTROL) 5 MG tablet Take 5 mg by mouth daily       bisacodyl (DULCOLAX) 10 MG suppository Place 10 mg rectally daily      sennosides-docusate sodium (SENOKOT-S) 8.6-50 MG tablet Take 1 tablet by mouth daily      famotidine (PEPCID) 20 MG tablet Take 1 tablet by mouth 2 times daily as needed (Heart burn)      CPAP Machine MISC by Does not apply route New Auto CPAP 8-20 cm with [employer] Simplus, Size L 1 each 0    Respiratory Therapy Supplies JOE New CPAP mask and supplies 1 Device 0    pravastatin (PRAVACHOL) 80 MG tablet Take 80 mg by mouth daily. 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

## 2019-08-26 NOTE — OP NOTE
[address]                      [facility], [address] [facility]                                OPERATIVE REPORT    PATIENT NAME: Sherren Hals                  :        1931  MED REC NO:   [id number]                            ROOM:  ACCOUNT NO:   [de-identified]                           ADMIT DATE: 2019  PROVIDER:     Travis Alvarado MD    DATE OF PROCEDURE:  2019    PREOPERATIVE DIAGNOSIS:  Left ureteral stone with stent, stone passed. POSTOPERATIVE DIAGNOSIS:  Left ureteral stone with stent, stone passed. PROCEDURE PERFORMED:  Flexible cystoscopy with left double-J stent  removal.    ANESTHESIA:  MAC.    ESTIMATED BLOOD LOSS:  Minimal.    COMPLICATIONS:  None. CONDITION:  Stable to recovery room. BRIEF CLINICAL NOTE:  This is an 59-year-old male with the history of  hypertension, pacemaker, chronic kidney disease, kidney stones,  diabetes, BPH, some renal cysts, who recently had a fall with a subdural  hematoma, was on AFib at that time. He is off of Xarelto currently. He  was seen in  for an obstructing left ureteral stone and urosepsis,  underwent emergent cystoscopy with stent insertion by Dr. Wendi Davalos for  this. He was discharged on IV antibiotics and was sent to nursing home. He has had several episodes of UTI since that time. He was seen  recently back in followup to discuss stent removal.  He had interval CT  scan which showed interval passage of his ureteral stone. It was felt  that the stent could be removed. I had a long discussion with the  patient and family about this procedure, and I had discussed coverage  for his history of UTIs with Dr. Jace Blackburn, from Infectious Disease, and he  had been started on, under her guidance, Zyvox p.o. prior to the  procedure and was given 600 mg IV dose of Zyvox prior to the procedure. A time-out was held at the beginning of the case.   Family discussion was  held at the beginning

## 2019-08-27 NOTE — PROGRESS NOTES
understand he is at risk for UTI's in the future if he an not empty the bladder to completion and will likely have incontinence given limited mobility currently. Plan:      1.  F/U 2 weeks for Flow/PVR        Soumya Morales MD

## 2019-09-09 NOTE — TELEPHONE ENCOUNTER
If he is not emptying the bladder then should have a catheter long term changed every 4 weeks. This was the main risk factor for problems and UTI's as was discussed at prior visits.

## 2022-06-05 NOTE — CARE COORDINATION
Patient to have Cysto today with Dr. Cesar Harvey. At this time, I do not anticipate any change in the discharge plan. Will reassess post procedure. Patient should return home with [facility].   Electronically signed by Mark Gtz RN on 6/6/2019 at 12:19 PM Patient requests all Lab, Cardiology, and Radiology Results on their Discharge Instructions

## 2022-06-29 NOTE — PROGRESS NOTES
Got patient up to chair for breakfast this morning and has been there since. Seems alert today, a little confused. Ambulated to the bathroom and had a BM, mostly steady gait, had some periods of unsteadiness, but safety maintained. Now sitting back in chair. Will continue to monitor. Pt advised DME for cpap and supplies is approved

## 2022-10-19 NOTE — PROGRESS NOTES
Subjective:      Patient ID: Radha Powell is a 80 y.o. male. 3 month f/u diabetes   Diabetes   He presents for his follow-up diabetic visit. He has type 2 diabetes mellitus. Associated symptoms include weakness. Diabetic complications include heart disease and nephropathy. Current diabetic treatment includes oral agent (dual therapy) (amamryl plus onglyza). His overall blood glucose range is 180-200 mg/dl. (Lab Results       Component                Value               Date                       LABA1C                   6.3                 05/28/2019            Recent higher sugars from higher carbs   ) An ACE inhibitor/angiotensin II receptor blocker is being taken.      Recent admission for subdural hematoma from fall at [facility]   Reviewed records     Patient Active Problem List   Diagnosis    Type II diabetes mellitus, uncontrolled ([facility]    CHF (congestive heart failure)    BPH (benign prostatic hyperplasia)    Hypercholesteremia    CKD (chronic kidney disease) stage 3, GFR 30-59 ml/min ([facility])    Dystrophy of anterior cornea    Acute sinusitis    Basal cell carcinoma of nose    Retained foreign body of eyelid    Tear film insufficiency    Hypertension    Atrial fibrillation (HCC)    Anticoagulant long-term use    Cardiomyopathy ([facility]    AICD (automatic cardioverter/defibrillator) present    DCM (dilated cardiomyopathy) ([facility]    Coronary artery disease involving native coronary artery of native heart with angina pectoris ([facility]    CHAYA (acute kidney injury) ([facility]     No Known Allergies      Current Outpatient Medications:     alogliptin (NESINA) 25 MG TABS tablet, Take 25 mg by mouth daily, Disp: , Rfl:     amantadine (SYMMETREL) 100 MG capsule, Take 100 mg by mouth daily, Disp: , Rfl:     carvedilol (COREG) 12.5 MG tablet, Take 12.5 mg by mouth 2 times daily (with meals), Disp: , Rfl:     glipiZIDE (GLUCOTROL) 5 MG tablet, Take 2.5 mg by mouth daily, Disp: , Rfl:     ondansetron (ZOFRAN) 4 MG tablet, Take 4 mg by mouth every 8 hours as needed for Nausea or Vomiting, Disp: , Rfl:     pantoprazole (PROTONIX) 40 MG tablet, Take 40 mg by mouth daily, Disp: , Rfl:     furosemide (LASIX) 20 MG tablet, Take 1 tablet by mouth daily, Disp: 30 tablet, Rfl: 3    blood glucose monitor strips, by Other route 4 times daily, Disp: 400 strip, Rfl: 2    CPAP Machine MISC, by Does not apply route New Auto CPAP 8-20 cm with [employer] Simplus, Size L, Disp: 1 each, Rfl: 0    Respiratory Therapy Supplies JOE, New CPAP mask and supplies, Disp: 1 Device, Rfl: 0    glucose blood VI test strips (EXACTECH TEST) strip, 1 each by In Vitro route 4 times daily As needed. , Disp: 100 each, Rfl: 11    Multiple Vitamin TABS, Take 1 tablet by mouth daily , Disp: , Rfl:     pravastatin (PRAVACHOL) 80 MG tablet, Take 80 mg by mouth daily. , Disp: , Rfl:       Review of Systems   Neurological: Positive for weakness. All other systems reviewed and are negative. Vitals:    05/28/19 1128   BP: 116/64   Pulse: 62   Weight: 218 lb (98.9 kg)   Height: 6' 2\" (1.88 m)       Objective:   Physical Exam   Constitutional: He appears well-developed and well-nourished. HENT:   Head: Normocephalic. Cardiovascular: Normal rate. Musculoskeletal: He exhibits edema. Neurological: He is alert. Psychiatric: He has a normal mood and affect. Assessment:       Diagnosis Orders   1.  Uncontrolled type 2 diabetes mellitus with hyperglycemia (HCC)  POCT Glucose    POCT glycosylated hemoglobin (Hb A1C)           Plan:      Orders Placed This Encounter   Procedures    POCT Glucose    POCT glycosylated hemoglobin (Hb A1C)       continue onglyza 2.5 mg daily plus amaryl 1 mg if glucose more than 150         Lower carb sweet intake      Sakina Silverman MD No

## 2023-09-04 NOTE — ED TRIAGE NOTES
Per family pt has not had any urine output since Monday morning. Pt is per wife good at taking in fluids. Patient endorses passive SI, denies plan Patient states he has had thoughts of hurting his family but will not provide details. Patient states he has had thoughts of hurting his family but will not provide details. Per collateral patient has been breaking things around the house None known Discussed with ED boarding Discussed with ED team. aunt informed of boarding status per transfer protocol

## (undated) DEVICE — JELLY,LUBE,STERILE,FLIP TOP,TUBE,2-OZ: Brand: MEDLINE

## (undated) DEVICE — TOWEL,OR,DSP,ST,BLUE,STD,4/PK,20PK/CS: Brand: MEDLINE

## (undated) DEVICE — TRAY PREP DRY W/ PREM GLV 2 APPL 6 SPNG 2 UNDPD 1 OVERWRAP

## (undated) DEVICE — DBD-PACK,CYSTOSCOPY,PK VI,AURORA: Brand: MEDLINE

## (undated) DEVICE — CYSTO/BLADDER IRRIGATION SET, REGULATING CLAMP

## (undated) DEVICE — BAG DRAINAGE URIN LIGEMAN W/ ADPT SUCTION HOSE CYSTO URO

## (undated) DEVICE — TUOHY-BORST SIDE-ARM ADAPTER: Brand: COOK

## (undated) DEVICE — GLOVE ORANGE PI 8   MSG9080

## (undated) DEVICE — ANGLED TIP URETERAL CATHETER WITH BENTSON PTFE WIRE GUIDE: Brand: ANGLED TIP

## (undated) DEVICE — HOOKED-PRONG GRASPING FORCEPS: Brand: TRICEP

## (undated) DEVICE — EVACUATOR URO BLDR W/ ADPT UROVAC

## (undated) DEVICE — SYRINGE MED 10ML LUERLOCK TIP W/O SFTY DISP

## (undated) DEVICE — LABEL MED MINI W/ MARKER

## (undated) DEVICE — POUCH DRNGE BG POLYETH FOR SKYTRON UROLOGY TBL

## (undated) DEVICE — SONY PRINTER PAPER

## (undated) DEVICE — TOTAL TRAY, DB, 100% SILI FOLEY, 16FR 10: Brand: MEDLINE

## (undated) DEVICE — DILATOR SURG URET 16FR

## (undated) DEVICE — ELECTRODE PT RET AD L9FT HI MOIST COND ADH HYDRGEL CORDED

## (undated) DEVICE — GOWN,AURORA,NONREINFORCED,LARGE: Brand: MEDLINE

## (undated) DEVICE — GOWN,SIRUS,POLYRNF,BRTHSLV,XLN/XL,20/CS: Brand: MEDLINE

## (undated) DEVICE — COVER FT SWCH W15XL17IN GRY ALL OEC SYS

## (undated) DEVICE — GLOVE ORANGE PI 7 1/2   MSG9075

## (undated) DEVICE — Z CONVERTED USE 2271043 CONTAINER SPEC COLL 4OZ SCR ON LID PEEL PCH